# Patient Record
Sex: FEMALE | Race: WHITE | Employment: OTHER | URBAN - METROPOLITAN AREA
[De-identification: names, ages, dates, MRNs, and addresses within clinical notes are randomized per-mention and may not be internally consistent; named-entity substitution may affect disease eponyms.]

---

## 2017-06-30 ENCOUNTER — HOSPITAL ENCOUNTER (INPATIENT)
Age: 72
LOS: 3 days | Discharge: HOME | DRG: 291 | End: 2017-07-03
Attending: EMERGENCY MEDICINE | Admitting: HOSPITALIST
Payer: MEDICARE

## 2017-06-30 ENCOUNTER — APPOINTMENT (OUTPATIENT)
Dept: GENERAL RADIOLOGY | Age: 72
DRG: 291 | End: 2017-06-30
Attending: EMERGENCY MEDICINE
Payer: MEDICARE

## 2017-06-30 DIAGNOSIS — J81.0 ACUTE PULMONARY EDEMA (HCC): ICD-10-CM

## 2017-06-30 DIAGNOSIS — R06.00 DYSPNEA, UNSPECIFIED TYPE: Primary | ICD-10-CM

## 2017-06-30 PROBLEM — J81.1 PULMONARY EDEMA: Status: ACTIVE | Noted: 2017-06-30

## 2017-06-30 LAB
ALBUMIN SERPL BCP-MCNC: 3 G/DL (ref 3.2–4.6)
ALBUMIN/GLOB SERPL: 0.7 {RATIO} (ref 1.2–3.5)
ALP SERPL-CCNC: 197 U/L (ref 50–136)
ALT SERPL-CCNC: 19 U/L (ref 12–65)
ANION GAP BLD CALC-SCNC: 7 MMOL/L (ref 7–16)
ARTERIAL PATENCY WRIST A: POSITIVE
AST SERPL W P-5'-P-CCNC: 13 U/L (ref 15–37)
BASE EXCESS BLDA CALC-SCNC: 6.7 MMOL/L (ref 0–3)
BASOPHILS # BLD AUTO: 0 K/UL (ref 0–0.2)
BASOPHILS # BLD: 1 % (ref 0–2)
BDY SITE: ABNORMAL
BILIRUB SERPL-MCNC: 0.5 MG/DL (ref 0.2–1.1)
BNP SERPL-MCNC: 384 PG/ML
BUN SERPL-MCNC: 18 MG/DL (ref 8–23)
CALCIUM SERPL-MCNC: 9.3 MG/DL (ref 8.3–10.4)
CHLORIDE SERPL-SCNC: 100 MMOL/L (ref 98–107)
CO2 SERPL-SCNC: 35 MMOL/L (ref 21–32)
COHGB MFR BLD: 1.4 % (ref 0.5–1.5)
CREAT SERPL-MCNC: 3.51 MG/DL (ref 0.6–1)
DIFFERENTIAL METHOD BLD: ABNORMAL
DO-HGB BLD-MCNC: 3 % (ref 0–5)
EOSINOPHIL # BLD: 0.1 K/UL (ref 0–0.8)
EOSINOPHIL NFR BLD: 2 % (ref 0.5–7.8)
ERYTHROCYTE [DISTWIDTH] IN BLOOD BY AUTOMATED COUNT: 15.2 % (ref 11.9–14.6)
GAS FLOW.O2 O2 DELIVERY SYS: 2 L/MIN
GLOBULIN SER CALC-MCNC: 4.5 G/DL (ref 2.3–3.5)
GLUCOSE BLD STRIP.AUTO-MCNC: 92 MG/DL (ref 65–100)
GLUCOSE SERPL-MCNC: 115 MG/DL (ref 65–100)
HCO3 BLDA-SCNC: 32 MMOL/L (ref 22–26)
HCT VFR BLD AUTO: 31.5 % (ref 35.8–46.3)
HGB BLD-MCNC: 10.1 G/DL (ref 11.7–15.4)
HGB BLDMV-MCNC: 10.1 GM/DL (ref 11.7–15)
IMM GRANULOCYTES # BLD: 0 K/UL (ref 0–0.5)
IMM GRANULOCYTES NFR BLD AUTO: 0.5 % (ref 0–5)
LYMPHOCYTES # BLD AUTO: 19 % (ref 13–44)
LYMPHOCYTES # BLD: 1.2 K/UL (ref 0.5–4.6)
MAGNESIUM SERPL-MCNC: 1.9 MG/DL (ref 1.8–2.4)
MCH RBC QN AUTO: 31.4 PG (ref 26.1–32.9)
MCHC RBC AUTO-ENTMCNC: 32.1 G/DL (ref 31.4–35)
MCV RBC AUTO: 97.8 FL (ref 79.6–97.8)
METHGB MFR BLD: 0.2 % (ref 0–1.5)
MONOCYTES # BLD: 0.5 K/UL (ref 0.1–1.3)
MONOCYTES NFR BLD AUTO: 7 % (ref 4–12)
NEUTS SEG # BLD: 4.6 K/UL (ref 1.7–8.2)
NEUTS SEG NFR BLD AUTO: 71 % (ref 43–78)
OXYHGB MFR BLDA: 95.4 % (ref 94–97)
PCO2 BLDA: 48 MMHG (ref 35–45)
PH BLDA: 7.44 [PH] (ref 7.35–7.45)
PHOSPHATE SERPL-MCNC: 2.2 MG/DL (ref 2.3–3.7)
PLATELET # BLD AUTO: 123 K/UL (ref 150–450)
PMV BLD AUTO: 9.2 FL (ref 10.8–14.1)
PO2 BLDA: 97 MMHG (ref 75–100)
POTASSIUM SERPL-SCNC: 3.3 MMOL/L (ref 3.5–5.1)
PROT SERPL-MCNC: 7.5 G/DL (ref 6.3–8.2)
RBC # BLD AUTO: 3.22 M/UL (ref 4.05–5.25)
SAO2 % BLD: 97 % (ref 92–98.5)
SODIUM SERPL-SCNC: 142 MMOL/L (ref 136–145)
TROPONIN I BLD-MCNC: 0.03 NG/ML (ref 0–0.08)
TROPONIN I SERPL-MCNC: 0.03 NG/ML (ref 0.02–0.05)
VENTILATION MODE VENT: ABNORMAL
WBC # BLD AUTO: 6.4 K/UL (ref 4.3–11.1)

## 2017-06-30 PROCEDURE — 99284 EMERGENCY DEPT VISIT MOD MDM: CPT | Performed by: EMERGENCY MEDICINE

## 2017-06-30 PROCEDURE — 84484 ASSAY OF TROPONIN QUANT: CPT | Performed by: EMERGENCY MEDICINE

## 2017-06-30 PROCEDURE — 65270000029 HC RM PRIVATE

## 2017-06-30 PROCEDURE — 71020 XR CHEST PA LAT: CPT

## 2017-06-30 PROCEDURE — 74011250636 HC RX REV CODE- 250/636: Performed by: HOSPITALIST

## 2017-06-30 PROCEDURE — 86580 TB INTRADERMAL TEST: CPT | Performed by: HOSPITALIST

## 2017-06-30 PROCEDURE — 82803 BLOOD GASES ANY COMBINATION: CPT

## 2017-06-30 PROCEDURE — 93005 ELECTROCARDIOGRAM TRACING: CPT | Performed by: EMERGENCY MEDICINE

## 2017-06-30 PROCEDURE — 83880 ASSAY OF NATRIURETIC PEPTIDE: CPT | Performed by: EMERGENCY MEDICINE

## 2017-06-30 PROCEDURE — 80053 COMPREHEN METABOLIC PANEL: CPT | Performed by: EMERGENCY MEDICINE

## 2017-06-30 PROCEDURE — 85025 COMPLETE CBC W/AUTO DIFF WBC: CPT | Performed by: EMERGENCY MEDICINE

## 2017-06-30 PROCEDURE — 36600 WITHDRAWAL OF ARTERIAL BLOOD: CPT

## 2017-06-30 PROCEDURE — 84100 ASSAY OF PHOSPHORUS: CPT | Performed by: HOSPITALIST

## 2017-06-30 PROCEDURE — 74011250637 HC RX REV CODE- 250/637: Performed by: HOSPITALIST

## 2017-06-30 PROCEDURE — 82962 GLUCOSE BLOOD TEST: CPT

## 2017-06-30 PROCEDURE — 74011000302 HC RX REV CODE- 302: Performed by: HOSPITALIST

## 2017-06-30 PROCEDURE — 83735 ASSAY OF MAGNESIUM: CPT | Performed by: HOSPITALIST

## 2017-06-30 RX ORDER — LANOLIN ALCOHOL/MO/W.PET/CERES
1000 CREAM (GRAM) TOPICAL DAILY
Status: DISCONTINUED | OUTPATIENT
Start: 2017-07-01 | End: 2017-07-03 | Stop reason: HOSPADM

## 2017-06-30 RX ORDER — ACETAMINOPHEN 325 MG/1
650 TABLET ORAL
Status: DISCONTINUED | OUTPATIENT
Start: 2017-06-30 | End: 2017-07-03 | Stop reason: HOSPADM

## 2017-06-30 RX ORDER — HEPARIN SODIUM 5000 [USP'U]/ML
5000 INJECTION, SOLUTION INTRAVENOUS; SUBCUTANEOUS EVERY 12 HOURS
Status: DISCONTINUED | OUTPATIENT
Start: 2017-06-30 | End: 2017-07-02

## 2017-06-30 RX ORDER — DIPHENHYDRAMINE HCL 25 MG
25 CAPSULE ORAL
Status: DISCONTINUED | OUTPATIENT
Start: 2017-06-30 | End: 2017-07-03 | Stop reason: HOSPADM

## 2017-06-30 RX ORDER — ALLOPURINOL 100 MG/1
100 TABLET ORAL DAILY
Status: DISCONTINUED | OUTPATIENT
Start: 2017-07-01 | End: 2017-07-03 | Stop reason: HOSPADM

## 2017-06-30 RX ORDER — FLUDROCORTISONE ACETATE 0.1 MG/1
100 TABLET ORAL
Status: DISCONTINUED | OUTPATIENT
Start: 2017-06-30 | End: 2017-07-03 | Stop reason: HOSPADM

## 2017-06-30 RX ORDER — MIDODRINE HYDROCHLORIDE 5 MG/1
2.5 TABLET ORAL
Status: DISCONTINUED | OUTPATIENT
Start: 2017-07-01 | End: 2017-07-03 | Stop reason: HOSPADM

## 2017-06-30 RX ORDER — INSULIN LISPRO 100 [IU]/ML
INJECTION, SOLUTION INTRAVENOUS; SUBCUTANEOUS
Status: DISCONTINUED | OUTPATIENT
Start: 2017-06-30 | End: 2017-07-02

## 2017-06-30 RX ORDER — SEVELAMER HYDROCHLORIDE 400 MG/1
800 TABLET, FILM COATED ORAL
Status: DISCONTINUED | OUTPATIENT
Start: 2017-07-01 | End: 2017-07-03 | Stop reason: HOSPADM

## 2017-06-30 RX ORDER — GABAPENTIN 100 MG/1
200 CAPSULE ORAL 3 TIMES DAILY
Status: DISCONTINUED | OUTPATIENT
Start: 2017-06-30 | End: 2017-07-03 | Stop reason: HOSPADM

## 2017-06-30 RX ORDER — SODIUM CHLORIDE 0.9 % (FLUSH) 0.9 %
5-10 SYRINGE (ML) INJECTION AS NEEDED
Status: DISCONTINUED | OUTPATIENT
Start: 2017-06-30 | End: 2017-07-03 | Stop reason: HOSPADM

## 2017-06-30 RX ORDER — DEXTROSE 50 % IN WATER (D50W) INTRAVENOUS SYRINGE
25-50 AS NEEDED
Status: DISCONTINUED | OUTPATIENT
Start: 2017-06-30 | End: 2017-07-03 | Stop reason: HOSPADM

## 2017-06-30 RX ORDER — ONDANSETRON 4 MG/1
4 TABLET, ORALLY DISINTEGRATING ORAL
Status: DISCONTINUED | OUTPATIENT
Start: 2017-06-30 | End: 2017-07-03 | Stop reason: HOSPADM

## 2017-06-30 RX ORDER — SODIUM CHLORIDE 0.9 % (FLUSH) 0.9 %
5-10 SYRINGE (ML) INJECTION EVERY 8 HOURS
Status: DISCONTINUED | OUTPATIENT
Start: 2017-06-30 | End: 2017-07-03 | Stop reason: HOSPADM

## 2017-06-30 RX ORDER — CLOPIDOGREL BISULFATE 75 MG/1
75 TABLET ORAL DAILY
Status: DISCONTINUED | OUTPATIENT
Start: 2017-07-01 | End: 2017-07-03 | Stop reason: HOSPADM

## 2017-06-30 RX ORDER — PANTOPRAZOLE SODIUM 40 MG/1
40 TABLET, DELAYED RELEASE ORAL DAILY
Status: DISCONTINUED | OUTPATIENT
Start: 2017-07-01 | End: 2017-07-03 | Stop reason: HOSPADM

## 2017-06-30 RX ORDER — DEXTROSE 40 %
15 GEL (GRAM) ORAL AS NEEDED
Status: DISCONTINUED | OUTPATIENT
Start: 2017-06-30 | End: 2017-07-03 | Stop reason: HOSPADM

## 2017-06-30 RX ORDER — INSULIN GLARGINE 100 [IU]/ML
40 INJECTION, SOLUTION SUBCUTANEOUS
Status: DISCONTINUED | OUTPATIENT
Start: 2017-06-30 | End: 2017-07-02

## 2017-06-30 RX ADMIN — GABAPENTIN 200 MG: 100 CAPSULE ORAL at 22:16

## 2017-06-30 RX ADMIN — Medication 10 ML: at 22:19

## 2017-06-30 RX ADMIN — HEPARIN SODIUM 5000 UNITS: 5000 INJECTION, SOLUTION INTRAVENOUS; SUBCUTANEOUS at 22:16

## 2017-06-30 RX ADMIN — TUBERCULIN PURIFIED PROTEIN DERIVATIVE 5 UNITS: 5 INJECTION INTRADERMAL at 22:17

## 2017-06-30 NOTE — IP AVS SNAPSHOT
Mike Lea 
 
 
 [address] [facility] [address] [facility] 
[phone] Patient: Rosana Both MRN: [id number] OCV:8/44/[id number] You are allergic to the following Allergen Reactions Codeine Other (comments) Pt can not recall type of reaction -- only remembers being told allergic Immunizations Administered for This Admission Name Date  
 TB Skin Test (PPD) Intradermal 6/30/2017 Recent Documentation Height Weight BMI OB Status Smoking Status 1.575 m 94.9 kg 38.28 kg/m2 Hysterectomy Never Smoker Emergency Contacts Name Discharge Info Relation Home Work Mobile Spencer Pacheco  Spouse [3] [phone] About your hospitalization You were admitted on:  June 30, 2017 You last received care in the:  [facility] You were discharged on:  July 3, 2017 Unit phone number:  [phone] Why you were hospitalized Your primary diagnosis was:  Pulmonary Edema Your diagnoses also included:  Esrd On Peritoneal Dialysis (Hcc), Hypothyroidism, Juan (Obstructive Sleep Apnea), Esrd On Hemodialysis (Hcc), Dm (Diabetes Mellitus) (Hcc), Chronic Diastolic Heart Failure (Hcc), Htn (Hypertension), Coronary Atherosclerosis Of Native Coronary Vessel, Chronic Respiratory Failure (Hcc) Providers Seen During Your Hospitalizations Provider Role Specialty Primary office phone Yu Tejada DO Attending Provider Emergency Medicine [phone] Zuri Guerra MD Attending Provider Internal Medicine [phone] Your Primary Care Physician (PCP) Primary Care Physician Office Phone Office Fax Lil, [address] Follow-up Information Follow up With Details Comments Contact Info Ramya Wilkins MD  Need follow up in 1 week message left for office to call patient at home. [facility] [id number] [phone] Current Discharge Medication List  
  
START taking these medications Dose & Instructions Dispensing Information Comments Morning Noon Evening Bedtime  
 carvedilol 3.125 mg tablet Commonly known as:  Master Ribeiro Dose:  3.125 mg Take 1 Tab by mouth two (2) times daily (with meals). Quantity:  60 Tab Refills:  0 CONTINUE these medications which have CHANGED Dose & Instructions Dispensing Information Comments Morning Noon Evening Bedtime  
 insulin glargine 100 unit/mL injection Commonly known as:  LANTUS What changed:   
- how much to take - when to take this Your next dose is: Take tonight Dose:  30 Units 30 Units by SubCUTAneous route daily. Quantity:  1 Vial  
Refills:  0 CONTINUE these medications which have NOT CHANGED Dose & Instructions Dispensing Information Comments Morning Noon Evening Bedtime  
 acetaminophen 325 mg tablet Commonly known as:  TYLENOL Dose:  650 mg Take 2 Tabs by mouth every four (4) hours as needed for Fever (for fever greater than 100.4 F). Quantity:  30 Tab Refills:  0  
     
   
   
   
  
 allopurinol 100 mg tablet Commonly known as:  Shante Fulling Your next dose is:  Tomorrow Morning Take  by mouth every morning. Refills:  0  
     
  
   
   
   
  
 fludrocortisone 0.1 mg tablet Commonly known as:  FLORINEF Your next dose is: Take tonight Take  by mouth nightly. Refills:  0  
     
   
   
   
  
  
 midodrine 2.5 mg tablet Commonly known as:  Michael Loser Your next dose is: This evening Take  by mouth three (3) times daily (with meals). Refills:  0 NEURONTIN 100 mg capsule Generic drug:  gabapentin Your next dose is: This evening Dose:  200 mg Take 200 mg by mouth three (3) times daily. Refills:  0 Omega-3 Fatty Acids 300 mg Cap Your next dose is: Take tonight Take  by mouth nightly. Last dose 6/20/16 Refills:  0  
     
   
   
   
  
  
 pantoprazole 40 mg tablet Commonly known as:  PROTONIX Your next dose is:  Tomorrow Morning Dose:  40 mg Take 40 mg by mouth daily. Refills:  0 PLAVIX 75 mg Tab Generic drug:  clopidogrel Your next dose is:  Tomorrow Morning Take  by mouth daily. Refills:  0  
     
  
   
   
   
  
 RENVELA 800 mg Tab tab Generic drug:  sevelamer carbonate Your next dose is: This evening Dose:  800 mg Take 800 mg by mouth three (3) times daily (with meals). Refills:  0  
     
  
   
  
   
  
   
  
 SYNTHROID 175 mcg tablet Generic drug:  levothyroxine Your next dose is:  Tomorrow Morning Dose:  175 mcg Take 175 mcg by mouth Daily (before breakfast). Refills:  0  
     
  
   
   
   
  
 VITAMIN B-12 1,000 mcg tablet Generic drug:  cyanocobalamin Your next dose is:  Tomorrow Morning Dose:  1000 mcg Take 1,000 mcg by mouth every morning. Refills:  0 Where to Get Your Medications Information on where to get these meds will be given to you by the nurse or doctor. ! Ask your nurse or doctor about these medications  
  carvedilol 3.125 mg tablet  
 insulin glargine 100 unit/mL injection Discharge Instructions Oxygen Therapy: Care Instructions Your Care Instructions Oxygen therapy helps you get more oxygen into your lungs and bloodstream. You may use it if you have a disease that makes it hard to breathe, such as COPD, pulmonary fibrosis (scarring of the lungs), or heart failure. Oxygen therapy can make it easier for you to breathe and can reduce your heart's workload. Some people need extra oxygen all the time.  Others need it from time to time throughout the day or overnight. A doctor will prescribe how much oxygen you need and how often to use it. To breathe the oxygen, most people use a nasal cannula (say \"THO-yuh-tia\"). This is a thin tube with two prongs that fit just inside your nose. People who need a lot of oxygen may need to use a mask that fits over the nose and mouth. Follow-up care is a key part of your treatment and safety. Be sure to make and go to all appointments, and call your doctor if you are having problems. It's also a good idea to know your test results and keep a list of the medicines you take. How can you care for yourself at home? To help yourself · Using oxygen may dry out your nose or lips. Use water-based lubricants on your lips or nostrils. Do not use an oil-based product like petroleum jelly. · If you use a nasal cannula, the tubing may rub under your nostrils and around your ears. To keep your skin from getting sore, tuck some gauze under the tubing. Use a water-based lotion on rubbed areas. · Do not use alcohol or take drugs that relax you, because they will slow your breathing rate. · Keep track of how much oxygen is in the tank, and reorder before it runs out. If a holiday is coming up or you expect bad weather, order in advance or make your regular order larger. · You may need extra oxygen when you travel to high altitudes or travel by plane. Ask your doctor about this. · If you are getting oxygen directly to your windpipe through an opening in your neck, your doctor will teach you how to care for the equipment. To make sure oxygen is flowing · Check the flow by holding your mask or cannula up to your ear and listening for the \"hiss\" of airflow. · If you have a nasal cannula, dip the prongs in a glass of water. If you see bubbles, oxygen is coming through. · Check your pressure gauge or contents indicator.  
· If you use an oxygen concentrator, make sure it is turned on and plugged in. If you use a cylinder, make sure the valve is open. · Look for kinks, blockages, or water in the tubing. Be sure the tubing is connected to the oxygen source. · Do not change your oxygen flow rate. Your doctor sets this at the correct level. Higher flow rates usually do not help and can increase the risk of harmful carbon dioxide buildup in the blood. To be safe · Do not leave cords or tubing running across an area where you or someone else may trip on it. · Do not let oxygen containers get hot. Store them in a cool place where there is airflow. Do not leave them in a car trunk or a hot vehicle. · Keep oxygen containers upright. Make sure they do not fall over and get damaged. Try securing the tanks in a sturdy container or securing them with a rope or a chain. · Watch for signs of oxygen leaks. If you hear a loud hissing from your container or if it empties too fast, stay away from the container. Open windows right away and call the company that brought the oxygen system to your home. · Do not use oxygen around anything that could spark or easily cause a fire. ¨ Do not smoke or let others smoke while you are using oxygen. Put up \"no smoking\" signs in your home. ¨ Do not use oxygen near open flames, such as candles, fireplaces, gas stoves, or hot water heaters. Do not use it near electric razors, hair dryers, heating pads, or anything that may spark. ¨ Keep a working fire extinguisher in your home where it is easy to get to. ¨ If a fire starts, turn off the oxygen right away and leave the house. ¨ If you have an oxygen concentrator, do not use it if the cord looks damaged. Do not use an extension cord to plug it in. Do not plug it into an outlet that has other appliances plugged into it. To care for the equipment · Follow the directions that come with the equipment for using and caring for it.  
· Wash your cannula or mask with a liquid soap and warm water 1 or 2 times a week. Replace them every 2 to 4 weeks. · If you have a cold, change the nasal prongs when your cold symptoms are done. · If you have an oxygen concentrator, unplug the unit and wipe down the cabinet with a damp cloth daily. Clean the air filter at least 2 times a week. Where can you learn more? Go to http://chapin-dusty.info/. Enter E117 in the search box to learn more about \"Oxygen Therapy: Care Instructions. \" Current as of: March 25, 2017 Content Version: 11.3 © 1689-4225 [employer]. Care instructions adapted under license by [employer] (which disclaims liability or warranty for this information). If you have questions about a medical condition or this instruction, always ask your healthcare professional. [address] any warranty or liability for your use of this information. Pulmonary Edema: Care Instructions Your Care Instructions Pulmonary edema is the buildup of fluid in the lungs. It usually occurs when the heart does not pump blood through the body properly. Pulmonary edema can also be caused by another disease, such as liver or kidney failure. It can also happen at high altitudes, from a poisoning, or as a result of a near-drowning. If you have fluid in your lungs, you may have trouble breathing, be restless, have a fast heart rate, or cough up foamy pink fluid. Breathing problems may be worse when you lie down. Follow-up care is a key part of your treatment and safety. Be sure to make and go to all appointments, and call your doctor if you are having problems. It's also a good idea to know your test results and keep a list of the medicines you take. How can you care for yourself at home? Medicines · Take your medicines exactly as prescribed. Call your doctor if you think you are having a problem with your medicine. · Review all of your regular medicines with your doctor.  Do not take any vitamins, over-the-counter medicines, or herbal products without talking to your doctor first. 
Diet · Eat a balanced diet. Make an appointment with a dietitian if you have questions about what type of diet might be best for you. · Do not eat more than 2,000 milligrams (mg) of sodium each day. That is less than 1 teaspoon of salt a day, including all the salt you eat in prepared or packaged foods. ¨ Do not add salt while you are cooking or at the table. Flavor with garlic, lemon juice, onion, vinegar, herbs, and spices instead of salt. ¨ Eat fewer processed foods and foods from restaurants, including fast food. ¨ Use fresh or frozen foods instead of canned. ¨ Count and record how much sodium you eat each day. Check food labels for sodium. ¨ Ask your doctor before using salt substitutes that have potassium, such as Lite Salt. Lifestyle · Stay out of air pollution; smog; cold, dry air; hot, humid air; and high altitudes. · Learn breathing methods that help the airflow in and out of your lungs. · Take rest breaks often. Schedule short rest breaks when doing housework and other activities. An occupational or physical therapist can help you find ways to do everyday activities with less effort. · Start light exercise if your doctor says it is okay. Try to stay as active as possible. If you have not exercised in the past, start out slowly. Walking is a good way to start. · Get enough rest at night. Sleeping with 1 or 2 pillows under your upper body and head may help you breathe easier at night. · Discuss rehabilitation with your doctor. Find out what programs are available in your area. · Do not smoke or use other tobacco products. Smoking can make your condition worse. If you need help quitting, talk to your doctor about stop-smoking programs and medicines. These can increase your chances of quitting for good. · Do not use alcohol or illegal drugs. When should you call for help? Call 911 anytime you think you may need emergency care. For example, call if: 
· You have severe trouble breathing. · You passed out (lost consciousness). · You have symptoms of a heart attack. These may include: ¨ Chest pain or pressure, or a strange feeling in the chest. 
¨ Sweating. ¨ Shortness of breath. ¨ Nausea or vomiting. ¨ Pain, pressure, or a strange feeling in the back, neck, jaw, or upper belly or in one or both shoulders or arms. ¨ Lightheadedness or sudden weakness. ¨ A fast or irregular heartbeat. Pain that spreads from the chest to the neck, jaw, or one or both shoulders or arms. After you call 911, the  may tell you to chew 1 adult-strength or 2 to 4 low-dose aspirin. Wait for an ambulance. Do not try to drive yourself. Call your doctor now or seek immediate medical care if: 
· You have trouble breathing or have wheezing that is getting worse. · You are coughing more deeply or more often. · You cough up blood. · You get a fever. · You have more swelling in your legs or belly. · Your symptoms are getting worse. Watch closely for changes in your health, and be sure to contact your doctor if you have any problems. Where can you learn more? Go to http://chapin-dusty.info/. Enter F487 in the search box to learn more about \"Pulmonary Edema: Care Instructions. \" Current as of: March 25, 2017 Content Version: 11.3 © 6104-7059 [employer]. Care instructions adapted under license by [employer] (which disclaims liability or warranty for this information). If you have questions about a medical condition or this instruction, always ask your healthcare professional. [place] [address] any warranty or liability for your use of this information. Learning About Heart Failure What is heart failure?  
 
Heart failure means that your heart muscle does not pump as much blood as your body needs. Failure does not mean that your heart has stopped. It means that your heart is not pumping as well as it should. Your body has an amazing ability to make up for heart failure. It may do such a good job that you don't know you have a disease. But at some point, your heart and body will no longer be able to keep up. Then fluid starts to build up in your lungs and other parts of your body. What can you expect when you have heart failure? Heart failure is a lifelong (chronic) disease. Treatment may be able to slow the disease and help you feel better. But heart failure tends to get worse over time. Despite this, there are many steps you can take to feel better and stay healthy longer. Early on, your symptoms may not be too bad. As heart failure gets worse, symptoms typically get worse, and you may need to limit your activities. Heart failure can also get worse suddenly. If this happens, you need emergency care. Then, after treatment, your symptoms may go back to being stable (which means they stay the same) for a long time. Heart failure can lead to other health problems, such as heart rhythm problems. Over time, your treatment options may change, especially as your symptoms get worse. As heart failure gets worse, palliative care can help improve the quality of your life. You can do advance care planning to decide what kind of care you want at the end of your life. What are the symptoms? Symptoms of heart failure start to happen when your heart can't pump enough blood to the rest of your body. In the early stages of heart failure, you may: · Feel tired easily. · Be short of breath when you exert yourself. · Feel like your heart is pounding or racing (palpitations). · Feel weak or dizzy. As heart failure gets worse, fluid starts to build up in your lungs and other parts of your body. This may cause you to: · Feel short of breath even at rest. 
 · Have swelling (edema), especially in your legs, ankles, and feet. · Gain weight. This may happen over just a day or two, or more slowly. · Cough or wheeze, especially when you lie down. How is heart failure treated? · You'll probably take several medicines. · You might attend cardiac rehabilitation (rehab) to get education and support that help you make lifestyle changes and stay as healthy as possible. · You may get a heart device. A pacemaker helps your heart pump blood. An ICD can stop abnormal heart rhythms. How can you care for yourself? There are many steps you can take to feel better and stay healthy longer. These steps are an important part of treatment. They can help you stay active and enjoy life. · Take your medicine the right way. Avoid medicines that can make your symptoms worse. · Check your weight and symptoms every day. Know what to do if your symptoms get worse. · Limit sodium. This helps keep fluid from building up. It may help you feel better. · Be active. Exercise regularly, but don't exercise too hard. · Be heart-healthy. Eat healthy foods, stay at a healthy weight, limit alcohol, and don't smoke. · Stay as healthy as possible. Avoid colds and flu, get help for depression and anxiety, and manage stress. Follow-up care is a key part of your treatment and safety. Be sure to make and go to all appointments, and call your doctor if you are having problems. It's also a good idea to know your test results and keep a list of the medicines you take. Where can you learn more? Go to http://chapin-dusty.info/. Enter L012 in the search box to learn more about \"Learning About Heart Failure. \" Current as of: November 15, 2016 Content Version: 11.3 © 8860-7993 [email or web address]. Care instructions adapted under license by [employer] (which disclaims liability or warranty for this information).  If you have questions about a medical condition or this instruction, always ask your healthcare professional. [address] any warranty or liability for your use of this information. DISCHARGE SUMMARY from Nurse The following personal items are in your possession at time of discharge: 
 
Dental Appliances: None Home Medications: None Jewelry: None Clothing: Marbella Busman Other Valuables: None PATIENT INSTRUCTIONS: 
 
 
F-face looks uneven A-arms unable to move or move unevenly S-speech slurred or non-existent T-time-call 911 as soon as signs and symptoms begin-DO NOT go Back to bed or wait to see if you get better-TIME IS BRAIN. Warning Signs of HEART ATTACK Call 911 if you have these symptoms: 
? Chest discomfort. Most heart attacks involve discomfort in the center of the chest that lasts more than a few minutes, or that goes away and comes back. It can feel like uncomfortable pressure, squeezing, fullness, or pain. ? Discomfort in other areas of the upper body. Symptoms can include pain or discomfort in one or both arms, the back, neck, jaw, or stomach. ? Shortness of breath with or without chest discomfort. ? Other signs may include breaking out in a cold sweat, nausea, or lightheadedness. Don't wait more than five minutes to call [address]! Fast action can save your life. Calling 911 is almost always the fastest way to get lifesaving treatment. Emergency Medical Services staff can begin treatment when they arrive  up to an hour sooner than if someone gets to the hospital by car. The discharge information has been reviewed with the patient.   The patient verbalized understanding. Discharge medications reviewed with the patient and appropriate educational materials and side effects teaching were provided. Discharge Orders None [employer] Announcement We are excited to announce that we are making your provider's discharge notes available to you in [facility]. You will see these notes when they are completed and signed by the physician that discharged you from your recent hospital stay. If you have any questions or concerns about any information you see in [facility], please call the Health Information Department where you were seen or reach out to your Primary Care Provider for more information about your plan of care. Introducing [address]! Dear Kerri Keita: Thank you for requesting a [employer] account. Our records indicate that you have previously registered for a [employer] account but its currently inactive. Please call our [employer] support line at [phone]. Additional Information If you have questions, please visit the Frequently Asked Questions section of the [employer] website at [email or web address] Remember, [employer] is NOT to be used for urgent needs. For medical emergencies, dial 911. Now available from your iPhone and Android! General Information Please provide this summary of care documentation to your next provider. Patient Signature:  ____________________________________________________________ Date:  ____________________________________________________________  
  
Nav Maynard Provider Signature:  ____________________________________________________________ Date:  ____________________________________________________________

## 2017-06-30 NOTE — ED PROVIDER NOTES
Patient is a 81 y.o. female presenting with shortness of breath. The history is provided by the patient and the spouse. Shortness of Breath   This is a new problem. The average episode lasts 4 days. The problem has not changed since onset. Associated symptoms include sputum production. Pertinent negatives include no fever, no headaches, no rhinorrhea, no ear pain, no cough, no wheezing, no chest pain, no syncope, no vomiting, no abdominal pain and no leg swelling. She has tried nothing for the symptoms. Associated medical issues include CAD. Associated medical issues do not include asthma, COPD, chronic lung disease or DVT. Past Medical History:   Diagnosis Date    Anemia of chronic disease     Arthritis     arthritis OA - generalized multiple joints - pt says \"not really any problems\" (6/21/16)    Chronic diastolic heart failure (HCC)     Chronic kidney disease     HD, dialysis MWF in [facility]    Chronic pain     pt denies this    Chronic respiratory failure (HCC)     Coagulation disorder ([address]     pernicious anemia chronic - tx with procrit     Congestive heart failure, unspecified     Coronary atherosclerosis of native coronary vessel     CVA (cerebral vascular accident) ([address] 6/25/2014    right hand weakness    Diabetes ([address] 1980's    type II; avg fastings- 105, last HA1C 5.8 in May 2016? Hypo sxs @ 80    Diabetic neuropathy (HCC)     DM (diabetes mellitus) ([address] 7/16/2014    End stage renal disease ([address]     ESRD on hemodialysis ([address] 4/2014    multiple temporary access sites    Fibromyalgia     Gout     Heart failure ([address]     Dx 2009 FROM FLUID OVERLOAD FROM KIDNEY DISEASE    Hemodialysis access site with mature fistula ([address] 8/27/14    permanent AV site left forearm placed    Hemodialysis access, AV graft ([address] 9/9/2014 8/27/14 (Dr Juan Delgado) Brachial artery antecubital forearm loop fistula.       Hepatomegaly     not sure why this is here- per pt    History of stroke 8/28/2014    Previous right residual     Hypercholesterolemia     Hypothyroidism approx 2000    controlled with meds     Lupus ([facility]     systemic    Morbid obesity ([address] 6/21/16    BMI- 39.2 (verbal)    NSVT (nonsustained ventricular tachycardia) ([address] 6/19/2014    Orthostatic hypotension     CASSIE (obstructive sleep apnea) 6/21/16    BIPAP     Poor historian 6/21/16    probably related to CVA in 2014    Sepsis ([facility] 6/19/2014    UNCLEAR ETIOLOGY      Stroke ([facility]     Unable to bear weight 8/28/2014    Unspecified sleep apnea 11/13    bi-pap    Vertigo as late effect of stroke 6/21/16    Weakness of both lower limbs 8/28/2014       Past Surgical History:   Procedure Laterality Date    COLONOSCOPY N/A 6/23/2016    COLONOSCOPY  BMI 37 performed by Brooks Brady MD at [address] [facility] HX GI      insertion of Peritoneal port RLQ of abdomen     HX GI      removal of peritoneal port     HX HYSTERECTOMY  1975    unknown ovaries    HX LAP CHOLECYSTECTOMY  2009    HX VASCULAR ACCESS  2013 and 4    multiple temporaries    HX VASCULAR ACCESS  8/27/14    permanent placement left forearm    HX VASCULAR ACCESS      Subclavian port in R chest wall (active)     VASCULAR SURGERY PROCEDURE UNLIST  8-27-14    L Brachial artery antecubital forearm loop fistula    VASCULAR SURGERY PROCEDURE UNLIST      L UA AV shunt placement (inactive)          Family History:   Problem Relation Age of Onset    Heart Disease Mother     Diabetes Mother     Lung Disease Mother     Heart Disease Father     Stroke Father     Diabetes Father     Cancer Sister     Diabetes Sister     Heart Disease Sister        Social History     Social History    Marital status:      Spouse name: N/A    Number of children: N/A    Years of education: N/A     Occupational History    Not on file.      Social History Main Topics    Smoking status: Never Smoker    Smokeless tobacco: Never Used    Alcohol use No    Drug use: No    Sexual activity: No     Other Topics Concern    Not on file     Social History Narrative    6/2014Married and lives with her . Disabled but previously worked as a . 8/28/14:  UNCHANGED FROM ABOVE. ALLERGIES: Codeine    Review of Systems   Constitutional: Negative. Negative for chills and fever. HENT: Negative. Negative for congestion, ear pain, postnasal drip and rhinorrhea. Eyes: Negative for pain and visual disturbance. Respiratory: Positive for sputum production and shortness of breath. Negative for cough and wheezing. Cardiovascular: Negative for chest pain, leg swelling and syncope. Gastrointestinal: Negative. Negative for abdominal distention, abdominal pain and vomiting. Endocrine: Negative. Negative for polydipsia, polyphagia and polyuria. Genitourinary: Negative. Negative for difficulty urinating, flank pain and frequency. Musculoskeletal: Negative. Negative for arthralgias and myalgias. Skin: Negative. Neurological: Negative. Negative for dizziness and headaches. Hematological: Negative. Vitals:    06/30/17 1708   BP: 173/61   Pulse: 84   Resp: 18   Temp: 97.6 °F (36.4 °C)   SpO2: 91%   Weight: 98.4 kg (217 lb)   Height: 5' 2\" (1.575 m)            Physical Exam   Constitutional: She is oriented to person, place, and time. She appears well-developed and well-nourished. No distress. HENT:   Head: Normocephalic and atraumatic. Cardiovascular: Intact distal pulses. Pulmonary/Chest: Effort normal. No respiratory distress. She has no wheezes. She has no rales. Abdominal: Soft. She exhibits no distension. There is no tenderness. There is no rebound. Neurological: She is alert and oriented to person, place, and time. Skin: Skin is warm and dry. Psychiatric: She has a normal mood and affect. Her behavior is normal.   Nursing note and vitals reviewed.        MDM  Number of Diagnoses or Management Options  Dyspnea, unspecified type: new and requires workup  Diagnosis management comments: ECG demonstrates a left bundle branch block which appears to be consistent with previous EKGs. Patient is pain-free. RN placed on nasal cannula as they felt her oxygen saturation was low in triage but currently is in the mid 90s. According to the patient  she used to wear oxygen at home for unknown reason for at least a couple years but has been off of home oxygen recently. Patient states that she has had relatively constant symptoms for the last 4 days. She describes a feeling of incomplete deep breaths. She states that she does not feel 100% at rest but does feel better than when she is doing something or lying down. She does have a history of diastolic heart failure and CAD. Denies history of DVT or PE. Patient also has end-stage renal disease on hemodialysis and had dialysis earlier today. She states they're aware of her symptoms but did not adjust her dialysis and felt that her dialysis didn't need to change as she has been consistent with her weights. Patient currently in x-ray and symptoms and findings thus far reviewed with the cardiologist on call. He asked that a BNP be added to her current labs to evaluate and would reassess after troponins and BNP completed. If any of these were positive, felt would possibly need to be admitted to their service otherwise felt could be managed on an outpatient basis given the consistency and unchanged systems for the last 4 days.        Amount and/or Complexity of Data Reviewed  Clinical lab tests: ordered and reviewed  Tests in the radiology section of CPT®: ordered and reviewed      ED Course       Procedures

## 2017-06-30 NOTE — ED TRIAGE NOTES
Pt c/o shortness of breath since Wednesday. Pt states she feels as if she cannot inhale enough. Pt is on dialysis- MWF. States she has had full runs all days this week. Pt denies any cough. Pt states pain when trying to inhale. Pt alert and oriented x 4. Respirations are even and unlabored. Pt appears in no acute distress at this time.

## 2017-07-01 LAB
ANION GAP BLD CALC-SCNC: 9 MMOL/L (ref 7–16)
ATRIAL RATE: 66 BPM
BUN SERPL-MCNC: 26 MG/DL (ref 8–23)
CALCIUM SERPL-MCNC: 9.4 MG/DL (ref 8.3–10.4)
CALCULATED P AXIS, ECG09: 63 DEGREES
CALCULATED R AXIS, ECG10: 78 DEGREES
CALCULATED T AXIS, ECG11: -71 DEGREES
CHLORIDE SERPL-SCNC: 97 MMOL/L (ref 98–107)
CO2 SERPL-SCNC: 33 MMOL/L (ref 21–32)
CREAT SERPL-MCNC: 4.77 MG/DL (ref 0.6–1)
DIAGNOSIS, 93000: NORMAL
ERYTHROCYTE [DISTWIDTH] IN BLOOD BY AUTOMATED COUNT: 15.5 % (ref 11.9–14.6)
EST. AVERAGE GLUCOSE BLD GHB EST-MCNC: NORMAL MG/DL
GLUCOSE BLD STRIP.AUTO-MCNC: 127 MG/DL (ref 65–100)
GLUCOSE BLD STRIP.AUTO-MCNC: 137 MG/DL (ref 65–100)
GLUCOSE BLD STRIP.AUTO-MCNC: 78 MG/DL (ref 65–100)
GLUCOSE BLD STRIP.AUTO-MCNC: 80 MG/DL (ref 65–100)
GLUCOSE BLD STRIP.AUTO-MCNC: 82 MG/DL (ref 65–100)
GLUCOSE SERPL-MCNC: 74 MG/DL (ref 65–100)
HBA1C MFR BLD: 4.9 % (ref 4.8–6)
HCT VFR BLD AUTO: 30.4 % (ref 35.8–46.3)
HGB BLD-MCNC: 9.4 G/DL (ref 11.7–15.4)
MCH RBC QN AUTO: 31.1 PG (ref 26.1–32.9)
MCHC RBC AUTO-ENTMCNC: 30.9 G/DL (ref 31.4–35)
MCV RBC AUTO: 100.7 FL (ref 79.6–97.8)
MM INDURATION POC: NORMAL MM (ref 0–5)
P-R INTERVAL, ECG05: 198 MS
PLATELET # BLD AUTO: 118 K/UL (ref 150–450)
PMV BLD AUTO: 9.7 FL (ref 10.8–14.1)
POTASSIUM SERPL-SCNC: 3.4 MMOL/L (ref 3.5–5.1)
PPD POC: 0 NEGATIVE
PROCALCITONIN SERPL-MCNC: 0.3 NG/ML
Q-T INTERVAL, ECG07: 454 MS
QRS DURATION, ECG06: 168 MS
QTC CALCULATION (BEZET), ECG08: 475 MS
RBC # BLD AUTO: 3.02 M/UL (ref 4.05–5.25)
SODIUM SERPL-SCNC: 139 MMOL/L (ref 136–145)
T4 FREE SERPL-MCNC: 1.3 NG/DL (ref 0.78–1.46)
TROPONIN I SERPL-MCNC: 0.03 NG/ML (ref 0.02–0.05)
TSH SERPL DL<=0.005 MIU/L-ACNC: 1.17 UIU/ML (ref 0.36–3.74)
VENTRICULAR RATE, ECG03: 66 BPM
WBC # BLD AUTO: 5.8 K/UL (ref 4.3–11.1)

## 2017-07-01 PROCEDURE — 65270000029 HC RM PRIVATE

## 2017-07-01 PROCEDURE — 84439 ASSAY OF FREE THYROXINE: CPT | Performed by: HOSPITALIST

## 2017-07-01 PROCEDURE — 83036 HEMOGLOBIN GLYCOSYLATED A1C: CPT | Performed by: HOSPITALIST

## 2017-07-01 PROCEDURE — 5A1D60Z PERFORMANCE OF URINARY FILTRATION, MULTIPLE: ICD-10-PCS | Performed by: INTERNAL MEDICINE

## 2017-07-01 PROCEDURE — 80048 BASIC METABOLIC PNL TOTAL CA: CPT | Performed by: HOSPITALIST

## 2017-07-01 PROCEDURE — 85027 COMPLETE CBC AUTOMATED: CPT | Performed by: HOSPITALIST

## 2017-07-01 PROCEDURE — 84484 ASSAY OF TROPONIN QUANT: CPT | Performed by: HOSPITALIST

## 2017-07-01 PROCEDURE — 82962 GLUCOSE BLOOD TEST: CPT

## 2017-07-01 PROCEDURE — 90935 HEMODIALYSIS ONE EVALUATION: CPT

## 2017-07-01 PROCEDURE — 77010033678 HC OXYGEN DAILY

## 2017-07-01 PROCEDURE — 84443 ASSAY THYROID STIM HORMONE: CPT | Performed by: HOSPITALIST

## 2017-07-01 PROCEDURE — 74011000250 HC RX REV CODE- 250: Performed by: HOSPITALIST

## 2017-07-01 PROCEDURE — C8929 TTE W OR WO FOL WCON,DOPPLER: HCPCS

## 2017-07-01 PROCEDURE — 74640000003 HC CRRT SET UP OR EXCHANGE

## 2017-07-01 PROCEDURE — 94760 N-INVAS EAR/PLS OXIMETRY 1: CPT

## 2017-07-01 PROCEDURE — 84145 PROCALCITONIN (PCT): CPT | Performed by: HOSPITALIST

## 2017-07-01 PROCEDURE — 74011250637 HC RX REV CODE- 250/637: Performed by: HOSPITALIST

## 2017-07-01 PROCEDURE — 94660 CPAP INITIATION&MGMT: CPT

## 2017-07-01 PROCEDURE — 74011250636 HC RX REV CODE- 250/636: Performed by: HOSPITALIST

## 2017-07-01 PROCEDURE — 36415 COLL VENOUS BLD VENIPUNCTURE: CPT | Performed by: HOSPITALIST

## 2017-07-01 RX ORDER — INSULIN GLARGINE 100 [IU]/ML
30 INJECTION, SOLUTION SUBCUTANEOUS DAILY
Qty: 1 VIAL | Refills: 0 | Status: ON HOLD
Start: 2017-07-01 | End: 2017-12-06

## 2017-07-01 RX ADMIN — PERFLUTREN 1 ML: 6.52 INJECTION, SUSPENSION INTRAVENOUS at 16:07

## 2017-07-01 RX ADMIN — GABAPENTIN 200 MG: 100 CAPSULE ORAL at 17:51

## 2017-07-01 RX ADMIN — RENAGEL 800 MG: 400 TABLET ORAL at 17:51

## 2017-07-01 RX ADMIN — GABAPENTIN 200 MG: 100 CAPSULE ORAL at 20:46

## 2017-07-01 RX ADMIN — CYANOCOBALAMIN TAB 1000 MCG 1000 MCG: 1000 TAB at 09:03

## 2017-07-01 RX ADMIN — CLOPIDOGREL BISULFATE 75 MG: 75 TABLET ORAL at 09:03

## 2017-07-01 RX ADMIN — Medication 5 ML: at 06:00

## 2017-07-01 RX ADMIN — GABAPENTIN 200 MG: 100 CAPSULE ORAL at 09:03

## 2017-07-01 RX ADMIN — HEPARIN SODIUM 5000 UNITS: 5000 INJECTION, SOLUTION INTRAVENOUS; SUBCUTANEOUS at 10:10

## 2017-07-01 RX ADMIN — Medication 10 ML: at 16:12

## 2017-07-01 RX ADMIN — LEVOTHYROXINE SODIUM 175 MCG: 125 TABLET ORAL at 05:18

## 2017-07-01 RX ADMIN — HEPARIN SODIUM 5000 UNITS: 5000 INJECTION, SOLUTION INTRAVENOUS; SUBCUTANEOUS at 20:46

## 2017-07-01 RX ADMIN — ALLOPURINOL 100 MG: 100 TABLET ORAL at 09:04

## 2017-07-01 RX ADMIN — PANTOPRAZOLE SODIUM 40 MG: 40 TABLET, DELAYED RELEASE ORAL at 09:03

## 2017-07-01 RX ADMIN — RENAGEL 800 MG: 400 TABLET ORAL at 05:18

## 2017-07-01 RX ADMIN — Medication 10 ML: at 23:50

## 2017-07-01 NOTE — PROGRESS NOTES
D/C cancelled due to hypoxia. Pt requires continued inpatient to cont treating pulm edema. Echo pending.   Obtain chest x ray in am.  May need additional HD tomorrow, will have nephrology see in am.

## 2017-07-01 NOTE — PROGRESS NOTES
TRANSFER - IN REPORT:    Verbal report received from [place] (name) on Jessee Mask  being received from ED(unit) for routine progression of care      Report consisted of patients Situation, Background, Assessment and   Recommendations(SBAR). Information from the following report(s) Kardex was reviewed with the receiving nurse. Opportunity for questions and clarification was provided. Assessment completed upon patients arrival to unit and care assumed.

## 2017-07-01 NOTE — PROGRESS NOTES
sats on RA 85% placed pt on 2 liters NC sats returned to 93% call placed to NP, will continue to monitor.

## 2017-07-01 NOTE — H&P
Hospitalist H&P/Consult Note     Admit Date:  2017  5:12 PM   Name:  Daija Mello   Age:  81 y.o.  :  1945   MRN:  [id number]   PCP:  Kymberly Torres MD  Treatment Team: Attending Provider: Glory Rossi MD    HPI:   Ashly 80 y/o female with ESRD, HD for past 5 years presents with shortness of breath without chest pain. Denies fever or chills. Has HD /Wed/Fri and had session today without incident. O2 sat was 90% on RA and she was placed on supplemental NC O2 in ED. Denies leg pain or swelling, no hx DVT or PE. She states last ECHO was over a year ago. Chest xray with acute mild pulmonary edema versus bibasilar atelectasis and/or consolidation. She is afebrile with a normal WBC ct of 6.4. She does not feel as if she has extra weight or fluid. Has CASSIE and uses BIPAP at night. No underlying respiratory disease such as COPD or pulmonary fibrosis. Follows with cardiology Dr Chela Olivas. Goes to dialysis center in [facility]. 10 systems reviewed and negative except as noted in HPI. Past Medical History:   Diagnosis Date    Anemia of chronic disease     Arthritis     arthritis OA - generalized multiple joints - pt says \"not really any problems\" (16)    Chronic diastolic heart failure (HCC)     Chronic kidney disease     HD, dialysis MWF in [facility]    Chronic pain     pt denies this    Chronic respiratory failure (HCC)     Coagulation disorder ([address]     pernicious anemia chronic - tx with procrit     Congestive heart failure, unspecified     Coronary atherosclerosis of native coronary vessel     CVA (cerebral vascular accident) ([address] 2014    right hand weakness    Diabetes ([address] 's    type II; avg fastings- 105, last HA1C 5.8 in May 2016?  Hypo sxs @ 82    Diabetic neuropathy (HCC)     DM (diabetes mellitus) ([address] 2014    End stage renal disease ([address]     ESRD on hemodialysis ([address] 2014    multiple temporary access sites    Fibromyalgia     Gout     Heart failure [facility])     Dx 2009 FROM FLUID OVERLOAD FROM KIDNEY DISEASE    Hemodialysis access site with mature fistula ([address] 8/27/14    permanent AV site left forearm placed    Hemodialysis access, AV graft ([address] 9/9/2014 8/27/14 (Dr Bladimir De) Brachial artery antecubital forearm loop fistula.  Hepatomegaly     not sure why this is here- per pt    History of stroke 8/28/2014    Previous right residual     Hypercholesterolemia     Hypothyroidism approx 2000    controlled with meds     Lupus ([address]     systemic    Morbid obesity ([address] 6/21/16    BMI- 39.2 (verbal)    NSVT (nonsustained ventricular tachycardia) ([address] 6/19/2014    Orthostatic hypotension     CASSIE (obstructive sleep apnea) 6/21/16    BIPAP     Poor historian 6/21/16    probably related to CVA in 2014    Sepsis ([address] 6/19/2014    UNCLEAR ETIOLOGY      Stroke ([address]     Unable to bear weight 8/28/2014    Unspecified sleep apnea 11/13    bi-pap    Vertigo as late effect of stroke 6/21/16    Weakness of both lower limbs 8/28/2014      Past Surgical History:   Procedure Laterality Date    COLONOSCOPY N/A 6/23/2016    COLONOSCOPY  BMI 37 performed by Cherl Spatz, MD at [address] [facility] HX GI      insertion of Peritoneal port RLQ of abdomen     HX GI      removal of peritoneal port     HX HYSTERECTOMY  1975    unknown ovaries    HX LAP CHOLECYSTECTOMY  2009    HX VASCULAR ACCESS  2013 and 4    multiple temporaries    HX VASCULAR ACCESS  8/27/14    permanent placement left forearm    HX VASCULAR ACCESS      Subclavian port in R chest wall (active)     VASCULAR SURGERY PROCEDURE UNLIST  8-27-14    L Brachial artery antecubital forearm loop fistula    VASCULAR SURGERY PROCEDURE UNLIST      L UA AV shunt placement (inactive)       Prior to Admission Medications   Prescriptions Last Dose Informant Patient Reported? Taking? Omega-3 Fatty Acids 300 mg cap   Yes No   Sig: Take  by mouth nightly.  Last dose 6/20/16   acetaminophen (TYLENOL) 325 mg tablet   No No   Sig: Take 2 Tabs by mouth every four (4) hours as needed for Fever (for fever greater than 100.4 F). allopurinol (ZYLOPRIM) 100 mg tablet   Yes No   Sig: Take  by mouth every morning. clopidogrel (PLAVIX) 75 mg tablet   Yes No   Sig: Take  by mouth daily. cyanocobalamin (VITAMIN B-12) 1,000 mcg tablet   Yes No   Sig: Take 1,000 mcg by mouth every morning. fludrocortisone (FLORINEF) 0.1 mg tablet   Yes No   Sig: Take  by mouth nightly.   gabapentin (NEURONTIN) 100 mg capsule   Yes No   Sig: Take 200 mg by mouth three (3) times daily. insulin glargine (LANTUS) 100 unit/mL injection   Yes No   Si Units by SubCUTAneous route nightly. levothyroxine (SYNTHROID) 175 mcg tablet   Yes No   Sig: Take 175 mcg by mouth Daily (before breakfast). midodrine (PROAMITINE) 2.5 mg tablet   Yes No   Sig: Take  by mouth three (3) times daily (with meals). pantoprazole (PROTONIX) 40 mg tablet   Yes No   Sig: Take 40 mg by mouth daily. sevelamer carbonate (RENVELA) 800 mg tab tab   Yes No   Sig: Take 800 mg by mouth three (3) times daily (with meals).       Facility-Administered Medications: None     Allergies   Allergen Reactions    Codeine Other (comments)     Pt can not recall type of reaction -- only remembers being told allergic       Social History   Substance Use Topics    Smoking status: Never Smoker    Smokeless tobacco: Never Used    Alcohol use No      Family History   Problem Relation Age of Onset    Heart Disease Mother     Diabetes Mother     Lung Disease Mother     Heart Disease Father     Stroke Father     Diabetes Father     Cancer Sister     Diabetes Sister     Heart Disease Sister       Immunization History   Administered Date(s) Administered    TB Skin Test (PPD) Intradermal 2014, 2014       Objective:   Patient Vitals for the past 24 hrs:   Temp Pulse Resp BP SpO2   175 97.4 °F (36.3 °C) 69 16 169/76 98 %   17 97.8 °F (36.6 °C) 67 - 153/88 98 %   06/30/17 1851 - - - 160/68 98 %   06/30/17 1818 - 66 - 170/70 97 %   06/30/17 1758 - 65 - 168/74 97 %   06/30/17 1745 - 65 - 164/72 97 %   06/30/17 1715 - - - - 98 %   06/30/17 1708 97.6 °F (36.4 °C) 84 18 173/61 91 %     Oxygen Therapy  O2 Sat (%): 98 % (06/30/17 2055)  Pulse via Oximetry: 67 beats per minute (06/30/17 2001)  O2 Device: Nasal cannula (06/30/17 2001)  O2 Flow Rate (L/min): 2 l/min (06/30/17 2001)  No intake or output data in the 24 hours ending 06/30/17 2122    Physical Exam:  General:    Well nourished. Alert. Seated upright in bed, O2 via NC  Eyes:   Normal sclera. Extraocular movements intact. ENT:  Normocephalic, atraumatic. Moist mucous membranes  CV:   RRR. No murmur, rub, or gallop. Lungs:  CTAB. bibasilar crackles  Abdomen: Soft, nontender, nondistended. Bowel sounds normal.   Extremities: Warm and dry. No cyanosis. Minimal LE edema  Neurologic: CN II-XII grossly intact. Sensation intact. Skin:     No rashes or jaundice. No wounds. Psych:  Normal mood and affect. I reviewed the labs, imaging, EKGs, telemetry, and other studies done this admission.   Data Review:   Recent Results (from the past 24 hour(s))   MAGNESIUM    Collection Time: 06/30/17  5:02 PM   Result Value Ref Range    Magnesium 1.9 1.8 - 2.4 mg/dL   PHOSPHORUS    Collection Time: 06/30/17  5:02 PM   Result Value Ref Range    Phosphorus 2.2 (L) 2.3 - 3.7 MG/DL   CBC WITH AUTOMATED DIFF    Collection Time: 06/30/17  5:12 PM   Result Value Ref Range    WBC 6.4 4.3 - 11.1 K/uL    RBC 3.22 (L) 4.05 - 5.25 M/uL    HGB 10.1 (L) 11.7 - 15.4 g/dL    HCT 31.5 (L) 35.8 - 46.3 %    MCV 97.8 79.6 - 97.8 FL    MCH 31.4 26.1 - 32.9 PG    MCHC 32.1 31.4 - 35.0 g/dL    RDW 15.2 (H) 11.9 - 14.6 %    PLATELET 859 (L) 819 - 450 K/uL    MPV 9.2 (L) 10.8 - 14.1 FL    DF AUTOMATED      NEUTROPHILS 71 43 - 78 %    LYMPHOCYTES 19 13 - 44 %    MONOCYTES 7 4.0 - 12.0 %    EOSINOPHILS 2 0.5 - 7.8 % BASOPHILS 1 0.0 - 2.0 %    IMMATURE GRANULOCYTES 0.5 0.0 - 5.0 %    ABS. NEUTROPHILS 4.6 1.7 - 8.2 K/UL    ABS. LYMPHOCYTES 1.2 0.5 - 4.6 K/UL    ABS. MONOCYTES 0.5 0.1 - 1.3 K/UL    ABS. EOSINOPHILS 0.1 0.0 - 0.8 K/UL    ABS. BASOPHILS 0.0 0.0 - 0.2 K/UL    ABS. IMM. GRANS. 0.0 0.0 - 0.5 K/UL   METABOLIC PANEL, COMPREHENSIVE    Collection Time: 06/30/17  5:12 PM   Result Value Ref Range    Sodium 142 136 - 145 mmol/L    Potassium 3.3 (L) 3.5 - 5.1 mmol/L    Chloride 100 98 - 107 mmol/L    CO2 35 (H) 21 - 32 mmol/L    Anion gap 7 7 - 16 mmol/L    Glucose 115 (H) 65 - 100 mg/dL    BUN 18 8 - 23 MG/DL    Creatinine 3.51 (H) 0.6 - 1.0 MG/DL    GFR est AA 17 (L) >60 ml/min/1.73m2    GFR est non-AA 14 (L) >60 ml/min/1.73m2    Calcium 9.3 8.3 - 10.4 MG/DL    Bilirubin, total 0.5 0.2 - 1.1 MG/DL    ALT (SGPT) 19 12 - 65 U/L    AST (SGOT) 13 (L) 15 - 37 U/L    Alk. phosphatase 197 (H) 50 - 136 U/L    Protein, total 7.5 6.3 - 8.2 g/dL    Albumin 3.0 (L) 3.2 - 4.6 g/dL    Globulin 4.5 (H) 2.3 - 3.5 g/dL    A-G Ratio 0.7 (L) 1.2 - 3.5     BNP    Collection Time: 06/30/17  5:12 PM   Result Value Ref Range     pg/mL   TROPONIN I    Collection Time: 06/30/17  5:12 PM   Result Value Ref Range    Troponin-I, Qt. 0.03 0.02 - 0.05 NG/ML   EKG, 12 LEAD, INITIAL    Collection Time: 06/30/17  5:46 PM   Result Value Ref Range    Ventricular Rate 66 BPM    Atrial Rate 66 BPM    P-R Interval 198 ms    QRS Duration 168 ms    Q-T Interval 454 ms    QTC Calculation (Bezet) 475 ms    Calculated P Axis 63 degrees    Calculated R Axis 78 degrees    Calculated T Axis -71 degrees    Diagnosis       !! AGE AND GENDER SPECIFIC ECG ANALYSIS !!   Normal sinus rhythm  Left bundle branch block  Abnormal ECG  When compared with ECG of 16-DEC-2015 16:18,  Premature ventricular complexes are no longer Present     POC TROPONIN-I    Collection Time: 06/30/17  7:23 PM   Result Value Ref Range    Troponin-I (POC) 0.03 0.0 - 0.08 ng/ml       Imaging Studies:  CXR Results  (Last 48 hours)               06/30/17 1843  XR CHEST PA LAT Final result    Impression:  Impression:  Acute mild pulmonary edema versus bibasilar atelectasis and or   consolidation. Narrative:  PA and lateral chest radiographs       History: dyspnea; on HD, 81 years Female       Comparison: Chest radiograph December 16, 2015       Findings: Interval removal of the left chest wall mita catheter. Persistent   mild apparent cardiomegaly. Persistent low lung volumes. Increased mild   bilateral perihilar and basilar airspace opacities, which may represent mild   pulmonary edema or atelectasis and or consolidation. No evidence of   pneumothorax, pleural effusion. Visualized soft tissue and osseous structures   otherwise unremarkable.                  CT Results  (Last 48 hours)    None          Assessment and Plan:     Hospital Problems as of 6/30/2017  Date Reviewed: 11/10/2016          Codes Class Noted - Resolved POA    * (Principal)Pulmonary edema ICD-10-CM: J81.1  ICD-9-CM: 573  6/30/2017 - Present Unknown        ESRD on hemodialysis ([facility] ICD-10-CM: N18.6, Z99.2  ICD-9-CM: 585.6, V45.11  8/28/2014 - Present Yes        Chronic diastolic heart failure (HCC) ICD-10-CM: I50.32  ICD-9-CM: 428.32  Unknown - Present Yes        HTN (hypertension), ESSENTIAL ICD-10-CM: I10  ICD-9-CM: 401.9  Unknown - Present Yes        Coronary atherosclerosis of native coronary vessel ICD-10-CM: I25.10  ICD-9-CM: 414.01  Unknown - Present Yes        CASSIE (obstructive sleep apnea) ICD-10-CM: G47.33  ICD-9-CM: 327.23  7/16/2014 - Present Yes    Overview Signed 7/16/2014  1:18 AM by Ender Gifford NP     BIPAP             DM (diabetes mellitus) ([facility] ICD-10-CM: E11.9  ICD-9-CM: 250.00  7/16/2014 - Present Yes        Hypothyroidism ICD-10-CM: E03.9  ICD-9-CM: 244.9  6/25/2014 - Present Yes        RESOLVED: ESRD on peritoneal dialysis ([facility] ICD-10-CM: N18.6, Z99.2  ICD-9-CM: 585.6, V45.11  7/16/2014 - 6/30/2017 Yes    Overview Signed 7/16/2014  1:19 AM by Gigi Cheng NP     STARTED 9/2013                   PLAN:  · Admit inpatient, remote telemetry  · Trend troponin  · Consult nephrology in am for hemodialysis  · Echocardiogram in am  · Check baseline ABG.  Continue supplemental O2 if indicated  · Resume BIPAP HS  · Check TSH, T4, procalcitonin  · Continue diabetes management  · SQ heparin for DVT prophylaxis      Estimated LOS: greater than 2 midnights     Signed:  Igor Bragg MD

## 2017-07-01 NOTE — PROGRESS NOTES
Admission assessment completed. Pt transported from ED. AAO x 4. RR even and unlabored. On 2 L via NC. Lung sounds diminished. Dyspnea with exertion, aware to call for assistance when ambulating. Bed locked, in low position, call light in reach. Will monitor. Skin assessment completed with La Nena Zapata LPN. Redness to left lower leg, states it is from \"calcifaction but it's almost gone\". Otherwise skin is intact.

## 2017-07-01 NOTE — DISCHARGE SUMMARY
Hospitalist Discharge Summary     Patient ID:  Luciano Monaco  [id number]  81 y.o.  1945  Admit date: 6/30/2017  5:12 PM  Discharge date and time: 7/1/2017  Attending: Hira Michael MD  PCP:  Reno Beck MD  Treatment Team: Attending Provider: Hira Michael MD; Consulting Provider: Minh Shepherd MD    Principal Diagnosis Pulmonary edema   Principal Problem:    Pulmonary edema (6/30/2017)    Active Problems:    Hypothyroidism (6/25/2014)      CASSIE (obstructive sleep apnea) (7/16/2014)      Overview: BIPAP      DM (diabetes mellitus) ([facility] [address] (7/16/2014)      ESRD on hemodialysis ([facility] [address] (8/28/2014)      Chronic diastolic heart failure (HCC) ()      HTN (hypertension), ESSENTIAL ()      Coronary atherosclerosis of native coronary vessel ()             Hospital Course:  Please refer to the admission H&P for details of presentation. In summary, the patient is a 80 yo ESRD pt who presented to ER with SOB x 1 week. Pt underwent her normal HD run prior to presenting to ER. Chest xray reveals acute mild pulm edema. No noted hypoxia but pt was placed on 2 L NC. She underwent an additional HD run this morning. She now states feeling well, denies SOB and is requesting home. She should follow up with PCP in 1 week. Additionally I have decreased her Lantus to 30 units due to borderline BS levels and A1C 4.9. Significant Diagnostic Studies:     06/30 Chest X Ray Impression:  Acute mild pulmonary edema versus bibasilar atelectasis and or consolidation.       Labs: Results:       Chemistry Recent Labs      07/01/17   0619  06/30/17   1712   GLU  74  115*   NA  139  142   K  3.4*  3.3*   CL  97*  100   CO2  33*  35*   BUN  26*  18   CREA  4.77*  3.51*   CA  9.4  9.3   AGAP  9  7   AP   --   197*   TP   --   7.5   ALB   --   3.0*   GLOB   --   4.5*   AGRAT   --   0.7*      CBC w/Diff Recent Labs      07/01/17   0619  06/30/17   1712   WBC  5.8  6.4   RBC  3.02*  3.22*   HGB  9.4*  10.1*   HCT  30.4* 31.5*   PLT  118*  123*   GRANS   --   71   LYMPH   --   19   EOS   --   2      Cardiac Enzymes No results for input(s): CPK, CKND1, NANETTE in the last 72 hours. No lab exists for component: CKRMB, TROIP   Coagulation No results for input(s): PTP, INR, APTT in the last 72 hours. No lab exists for component: INREXT    Lipid Panel Lab Results   Component Value Date/Time    Cholesterol, total 192 08/29/2014 05:28 AM    HDL Cholesterol 30 08/29/2014 05:28 AM    LDL, calculated 103.2 08/29/2014 05:28 AM    VLDL, calculated 58.8 08/29/2014 05:28 AM    Triglyceride 294 08/29/2014 05:28 AM    CHOL/HDL Ratio 6.4 08/29/2014 05:28 AM      BNP No results for input(s): BNPP in the last 72 hours. Liver Enzymes Recent Labs      06/30/17   1712   TP  7.5   ALB  3.0*   AP  197*   SGOT  13*      Thyroid Studies Lab Results   Component Value Date/Time    TSH 1.170 07/01/2017 06:19 AM            Discharge Exam:  Visit Vitals    /61    Pulse 76    Temp 98.3 °F (36.8 °C)    Resp (!) 2    Ht 5' 2\" (1.575 m)    Wt 98.4 kg (217 lb)    SpO2 97%    BMI 39.69 kg/m2     General appearance: alert, cooperative, no distress, appears stated age  Lungs: clear to auscultation bilaterally  Heart: regular rate and rhythm, S1, S2 normal, no murmur, click, rub or gallop  Abdomen: soft, non-tender. Bowel sounds normal. No masses,  no organomegaly  Extremities: no cyanosis or edema  Neurologic: Grossly normal    Disposition: Home   Discharge Condition: stable  Patient Instructions:   Current Discharge Medication List      CONTINUE these medications which have CHANGED    Details   insulin glargine (LANTUS) 100 unit/mL injection 30 Units by SubCUTAneous route daily. Qty: 1 Vial, Refills: 0         CONTINUE these medications which have NOT CHANGED    Details   midodrine (PROAMITINE) 2.5 mg tablet Take  by mouth three (3) times daily (with meals). pantoprazole (PROTONIX) 40 mg tablet Take 40 mg by mouth daily.       clopidogrel (PLAVIX) 75 mg tablet Take  by mouth daily. Omega-3 Fatty Acids 300 mg cap Take  by mouth nightly. Last dose 6/20/16      allopurinol (ZYLOPRIM) 100 mg tablet Take  by mouth every morning.      gabapentin (NEURONTIN) 100 mg capsule Take 200 mg by mouth three (3) times daily. levothyroxine (SYNTHROID) 175 mcg tablet Take 175 mcg by mouth Daily (before breakfast). fludrocortisone (FLORINEF) 0.1 mg tablet Take  by mouth nightly. acetaminophen (TYLENOL) 325 mg tablet Take 2 Tabs by mouth every four (4) hours as needed for Fever (for fever greater than 100.4 F). Qty: 30 Tab, Refills: 0      sevelamer carbonate (RENVELA) 800 mg tab tab Take 800 mg by mouth three (3) times daily (with meals). cyanocobalamin (VITAMIN B-12) 1,000 mcg tablet Take 1,000 mcg by mouth every morning.            Activity: Regular, as tolerated   Diet: Renal, DM     Follow-up  ·   PCP in 1-2 weeks   Time spent to discharge patient greater than 30 minutes  Signed:  Alexandria Hickey NP  7/1/2017  3:01 PM

## 2017-07-01 NOTE — DIALYSIS
Hemodialysis treatment initiated using Left FAF and 15 g needles by Oswaldo Harm. Pt alert and VS stable. Machine settings per MD order. Will monitor during treatment.

## 2017-07-01 NOTE — ED NOTES
TRANSFER - OUT REPORT:    Verbal report given to Dave Montaño on Moon Mcclellan  being transferred to [address] [place] [address] for routine progression of care       Report consisted of patients Situation, Background, Assessment and   Recommendations(SBAR). Information from the following report(s) SBAR, ED Summary, Procedure Summary, Intake/Output, MAR, Recent Results and Cardiac Rhythm sr was reviewed with the receiving nurse. Lines:   Venous Access Device 07/15/14 Upper chest (subclavicular area), left (Active)       Peripheral IV 06/30/17 Right Antecubital (Active)   Site Assessment Clean, dry, & intact 6/30/2017  7:31 PM   Phlebitis Assessment 0 6/30/2017  7:31 PM   Infiltration Assessment 0 6/30/2017  7:31 PM   Dressing Status Clean, dry, & intact 6/30/2017  7:31 PM        Opportunity for questions and clarification was provided.       Patient transported with:   Monitor  O2 @ 2 liters  Tech

## 2017-07-01 NOTE — DIALYSIS
Machine setup clotted. Machine set back up and treatment restarted without difficulty. Will continue to monitor.

## 2017-07-01 NOTE — PROGRESS NOTES
Dialysis treatment completed at 2:30 pm.  Pt tolerated well. 3 Kgs removed. Two 15 ga needles removed from access and manual pressure held until hemostasis complete and pressure dressing applied. Pt alert and oriented x 4; Vital signs stable. Pt to room after dialysis completed.

## 2017-07-02 ENCOUNTER — APPOINTMENT (OUTPATIENT)
Dept: GENERAL RADIOLOGY | Age: 72
DRG: 291 | End: 2017-07-02
Attending: NURSE PRACTITIONER
Payer: MEDICARE

## 2017-07-02 LAB
ANION GAP BLD CALC-SCNC: 9 MMOL/L (ref 7–16)
BUN SERPL-MCNC: 30 MG/DL (ref 8–23)
CALCIUM SERPL-MCNC: 9 MG/DL (ref 8.3–10.4)
CHLORIDE SERPL-SCNC: 101 MMOL/L (ref 98–107)
CO2 SERPL-SCNC: 29 MMOL/L (ref 21–32)
CREAT SERPL-MCNC: 5.68 MG/DL (ref 0.6–1)
GLUCOSE BLD STRIP.AUTO-MCNC: 123 MG/DL (ref 65–100)
GLUCOSE BLD STRIP.AUTO-MCNC: 131 MG/DL (ref 65–100)
GLUCOSE BLD STRIP.AUTO-MCNC: 147 MG/DL (ref 65–100)
GLUCOSE BLD STRIP.AUTO-MCNC: 156 MG/DL (ref 65–100)
GLUCOSE SERPL-MCNC: 141 MG/DL (ref 65–100)
MM INDURATION POC: NORMAL MM (ref 0–5)
POTASSIUM SERPL-SCNC: 4.5 MMOL/L (ref 3.5–5.1)
PPD POC: 0 NEGATIVE
SODIUM SERPL-SCNC: 139 MMOL/L (ref 136–145)

## 2017-07-02 PROCEDURE — 74011250637 HC RX REV CODE- 250/637: Performed by: INTERNAL MEDICINE

## 2017-07-02 PROCEDURE — 77010033678 HC OXYGEN DAILY

## 2017-07-02 PROCEDURE — 94760 N-INVAS EAR/PLS OXIMETRY 1: CPT

## 2017-07-02 PROCEDURE — 80048 BASIC METABOLIC PNL TOTAL CA: CPT | Performed by: NURSE PRACTITIONER

## 2017-07-02 PROCEDURE — 94761 N-INVAS EAR/PLS OXIMETRY MLT: CPT

## 2017-07-02 PROCEDURE — 36415 COLL VENOUS BLD VENIPUNCTURE: CPT | Performed by: NURSE PRACTITIONER

## 2017-07-02 PROCEDURE — 65270000029 HC RM PRIVATE

## 2017-07-02 PROCEDURE — 74011250637 HC RX REV CODE- 250/637: Performed by: HOSPITALIST

## 2017-07-02 PROCEDURE — 71010 XR CHEST PORT: CPT

## 2017-07-02 PROCEDURE — 74011636637 HC RX REV CODE- 636/637: Performed by: INTERNAL MEDICINE

## 2017-07-02 PROCEDURE — 82962 GLUCOSE BLOOD TEST: CPT

## 2017-07-02 RX ORDER — INSULIN LISPRO 100 [IU]/ML
INJECTION, SOLUTION INTRAVENOUS; SUBCUTANEOUS
Status: DISCONTINUED | OUTPATIENT
Start: 2017-07-02 | End: 2017-07-03 | Stop reason: HOSPADM

## 2017-07-02 RX ORDER — CARVEDILOL 3.12 MG/1
3.12 TABLET ORAL 2 TIMES DAILY WITH MEALS
Status: DISCONTINUED | OUTPATIENT
Start: 2017-07-02 | End: 2017-07-03 | Stop reason: HOSPADM

## 2017-07-02 RX ORDER — CYCLOBENZAPRINE HCL 10 MG
5 TABLET ORAL DAILY PRN
Status: DISCONTINUED | OUTPATIENT
Start: 2017-07-02 | End: 2017-07-03 | Stop reason: HOSPADM

## 2017-07-02 RX ORDER — INSULIN GLARGINE 100 [IU]/ML
30 INJECTION, SOLUTION SUBCUTANEOUS
Status: DISCONTINUED | OUTPATIENT
Start: 2017-07-02 | End: 2017-07-03 | Stop reason: HOSPADM

## 2017-07-02 RX ORDER — NYSTATIN 100000 [USP'U]/G
POWDER TOPICAL 2 TIMES DAILY
Status: DISCONTINUED | OUTPATIENT
Start: 2017-07-02 | End: 2017-07-03 | Stop reason: HOSPADM

## 2017-07-02 RX ADMIN — NYSTATIN: 100000 POWDER TOPICAL at 17:15

## 2017-07-02 RX ADMIN — GABAPENTIN 200 MG: 100 CAPSULE ORAL at 09:05

## 2017-07-02 RX ADMIN — CYANOCOBALAMIN TAB 1000 MCG 1000 MCG: 1000 TAB at 09:05

## 2017-07-02 RX ADMIN — CYCLOBENZAPRINE HYDROCHLORIDE 5 MG: 10 TABLET, FILM COATED ORAL at 17:13

## 2017-07-02 RX ADMIN — NYSTATIN: 100000 POWDER TOPICAL at 12:15

## 2017-07-02 RX ADMIN — INSULIN LISPRO 2 UNITS: 100 INJECTION, SOLUTION INTRAVENOUS; SUBCUTANEOUS at 12:16

## 2017-07-02 RX ADMIN — Medication 10 ML: at 15:19

## 2017-07-02 RX ADMIN — Medication 10 ML: at 05:41

## 2017-07-02 RX ADMIN — CARVEDILOL 3.12 MG: 3.12 TABLET, FILM COATED ORAL at 17:13

## 2017-07-02 RX ADMIN — ALLOPURINOL 100 MG: 100 TABLET ORAL at 09:05

## 2017-07-02 RX ADMIN — GABAPENTIN 200 MG: 100 CAPSULE ORAL at 20:30

## 2017-07-02 RX ADMIN — LEVOTHYROXINE SODIUM 175 MCG: 125 TABLET ORAL at 05:41

## 2017-07-02 RX ADMIN — RENAGEL 800 MG: 400 TABLET ORAL at 05:42

## 2017-07-02 RX ADMIN — PANTOPRAZOLE SODIUM 40 MG: 40 TABLET, DELAYED RELEASE ORAL at 09:05

## 2017-07-02 RX ADMIN — Medication 10 ML: at 20:30

## 2017-07-02 RX ADMIN — INSULIN GLARGINE 30 UNITS: 100 INJECTION, SOLUTION SUBCUTANEOUS at 20:35

## 2017-07-02 RX ADMIN — GABAPENTIN 200 MG: 100 CAPSULE ORAL at 17:13

## 2017-07-02 RX ADMIN — RENAGEL 800 MG: 400 TABLET ORAL at 12:15

## 2017-07-02 RX ADMIN — RENAGEL 800 MG: 400 TABLET ORAL at 17:13

## 2017-07-02 NOTE — CONSULTS
[address]       Name:  Efrain Coffey   MR#:  [id number]   :  1945   Account #:  [de-identified]   Date of Adm:  2017       REASON FOR CONSULTATION: End-stage renal disease, in dialysis,   maybe pulmonary edema. HISTORY OF THE PRESENT ILLNESS: This patient is a 74-year-old   female with a history of end-stage renal disease on hemodialysis   Monday, Wednesday, Friday , obstructive sleep apnea. She came yesterday and admitted because of shortness of breath   which started 3 days ago, not relieved with dialysis. On   ultrafiltration despite the decrease of dry weight by 1 kg   below. The patient has shortness of breath with minimal exertion,   orthopnea. No lower extremity edema. She has left-sided chest   pain, but that is on-and-off, old, not new. No fever, no chills,   no cough. No leg swelling. The patient denied any other symptoms, like nausea, vomiting,   abdominal pain, diarrhea, GI bleed. No dysuria or gross   hematuria. REVIEW OF SYSTEMS: As above. PREVIOUS MEDICAL HISTORY: History of end-stage renal disease on   hemodialysis Monday, Wednesday, Friday. History of   obstructive sleep apnea. History of anemia, arthritis, obstructive   sleep apnea, coronary artery disease, CVA, type 2 diabetes   mellitus, gout. The patient with congestive heart failure here. I do not have documentation. Obesity. SURGICAL HISTORY: Hysterectomy, cholecystectomy. ALLERGIES: CODEINE. SOCIAL HISTORY: The patient never smoked. No alcohol abuse. PHYSICAL EXAMINATION   VITAL SIGNS: Temperature is 97.5. Heart rate 68. Blood pressure   145/68. The patient is requiring O2 supplement at 2 liters per   minutes. She came with hypoxia with oxygen saturation in the 80s   and now 97%. GENERAL: She is not in acute respiratory distress at rest. She is   obese. NECK: No lymph nodes, no thyromegaly. Obese. LUNGS: I do not hear any wheezing, no crackles.  There are decreased breath sounds, full thoracic expansion. HEART: Regular rhythm. Systolic murmur 2/6, left sternal border, no   rub. ABDOMEN: Obese pain, soft, no organomegaly, no masses. EXTREMITIES: There is trace edema. IMPRESSION    1. Shortness of breath with some evidence of lung infiltrates, maybe   edema, on the chest x-ray, with hypoxia. There is no clear   evidence of pulmonary embolus. 2. End-stage renal disease. 3. Type 2 diabetes mellitus. 4. Obstructive sleep apnea. RECOMMENDATIONS: Will try to decrease further the dry weight to   relieve the lung edema. The patient is scheduled to have   echocardiogram to assess the cardiac function. Will follow with   chest x-ray after dialysis to see if there is any improvement of   the infiltrate.          MD MARIN Coker / Jareth Morejon   D:  07/01/2017   10:03   T:  07/02/2017   01:18   Job #:  [id number]

## 2017-07-02 NOTE — PROGRESS NOTES
PM assessment completed. Pt sitting up in chair. Denies any pain. No s/s of distress. On 2 L via NC. Aware to call for assistance when ambulating. Call light in reach. Will monitor.

## 2017-07-02 NOTE — PROGRESS NOTES
Admit Date: 6/30/2017      Subjective:          Review of Systems  Cardio-vascular: no chest pain, SOB better  GI: no N/V/D  : no dysuria, no hematuria    Objective:     Patient Vitals for the past 8 hrs:   BP Temp Pulse Resp SpO2 Weight   07/02/17 0752 140/63 98.1 °F (36.7 °C) 69 18 96 % -   07/02/17 0730 - - - - 92 % -   07/02/17 0556 - - - - - 93.6 kg (206 lb 4.8 oz)   07/02/17 0323 150/68 98 °F (36.7 °C) 72 16 99 % -            Physical Exam:   Lungs: decreased BS  CV: RR,   Abdomen: soft, not tender, no rebound. Ext: trace  edema          Data Review   Recent Results (from the past 8 hour(s))   GLUCOSE, POC    Collection Time: 07/02/17  5:43 AM   Result Value Ref Range    Glucose (POC) 123 (H) 65 - 100 mg/dL           Assessment:     Principal Problem:    Pulmonary edema (6/30/2017)    Active Problems:    Hypothyroidism (6/25/2014)      CASSIE (obstructive sleep apnea) (7/16/2014)      Overview: BIPAP      DM (diabetes mellitus) ([facility] [address] (7/16/2014)      ESRD on hemodialysis ([facility] [address] (8/28/2014)      Chronic diastolic heart failure (HCC) ()      HTN (hypertension), ESSENTIAL ()      Coronary atherosclerosis of native coronary vessel ()        Plan:   Chest xary better, but Still desaturation.   EF 40%  Dialysis again in am         Jefe Mcgarry MD

## 2017-07-02 NOTE — PROGRESS NOTES
Called into the room pt states she is cramping in her legs and then arms, Michael Silverman was paged and notified, stat BMP was ordered and lab called to inform them of stat orders. Will continue to monitor.

## 2017-07-02 NOTE — PROGRESS NOTES
PM assessment completed. Pt drowsy but AAO x 4. Flexeril given at 1713. RR even and unlabored. Denies any pain. Aware to call for assistance when needed. Bed locked, in low position, call light in reach. Will monitor.

## 2017-07-02 NOTE — PROGRESS NOTES
Oxygen Qualifier       Room air: SpO2 with O2 and liter flow   Resting SpO2  87%  95% on 2L   Ambulating SpO2   93% on 2L     Pt in a stable chronic state    Completed by:    Ez Lambert, RT

## 2017-07-02 NOTE — PROGRESS NOTES
Assisted pt to restroom. Gait unsteady but able to ambulate to restroom slowly with minimal assistance. Assisted back to bed. Tolerated well. Will continue to monitor.

## 2017-07-02 NOTE — PROGRESS NOTES
Hospitalist Progress Note    2017  Admit Date: 2017  5:12 PM   NAME: Elsy Marie   :  1945   MRN:  [id number]   Attending: Edie Back MD  PCP:  Cindy Knight MD    SUBJECTIVE:       Ms. Devika Walls is a 82 yo ESRD pt who presented to ER  with SOB x 1 week. Pt underwent her normal HD run prior to presenting to ER. Chest xray revealed acute mild pulm edema. She underwent an additional HD run Saturday (typically runs MWF). After HD repeat chest x ray shows improvement of pulm edema. Pt noted with 02 sats 89% at rest and 87% during ambulation. She has never required 02 in the past and does not complain of SOB during this event. Echo reveals EF 40-45%. This morning pt is sitting up in bed. Noted with nose bleed. She denies any pain and states SOB feels improved.  at bedside. Review of Systems negative with exception of pertinent positives noted above  PHYSICAL EXAM     Visit Vitals    /63    Pulse 69    Temp 98.1 °F (36.7 °C)    Resp 18    Ht 5' 2\" (1.575 m)    Wt 93.6 kg (206 lb 4.8 oz)    SpO2 96%    BMI 37.73 kg/m2      Temp (24hrs), Av °F (36.7 °C), Min:97.7 °F (36.5 °C), Max:98.1 °F (36.7 °C)    Oxygen Therapy  O2 Sat (%): 96 % (17 0752)  Pulse via Oximetry: 72 beats per minute (17)  O2 Device: Nasal cannula (17)  O2 Flow Rate (L/min): 2 l/min (17)    Intake/Output Summary (Last 24 hours) at 17 1123  Last data filed at 17 1429   Gross per 24 hour   Intake                0 ml   Output             3000 ml   Net            -3000 ml      General: No acute distress    Lungs:  CTA Bilaterally.    Heart:  Regular rate and rhythm,  No murmur, rub, or gallop  Abdomen: Soft, obese,  Non distended, Non tender, Positive bowel sounds  Extremities: No cyanosis, clubbing or edema  Neurologic:  No focal deficits    ASSESSMENT      Active Hospital Problems    Diagnosis Date Noted    Pulmonary edema 06/30/2017    Chronic diastolic heart failure (HCC)     HTN (hypertension), ESSENTIAL     Coronary atherosclerosis of native coronary vessel     ESRD on hemodialysis ([facility] [address] 08/28/2014    CASSIE (obstructive sleep apnea) 07/16/2014     BIPAP      DM (diabetes mellitus) ([facility] [address] 07/16/2014    Hypothyroidism 06/25/2014     A/P:    Hypoxia, pulm edema with ESRD- Plan for HD tomorrow. Wean 02 for sats > 90. Encourage ambulation. If pt does not improve after HD tomorrow may be d/nicole home with home 02. CHF- New finding. Will trial low dose Coreg but monitor BP closely. Recommend outpatient cards. ESRD- Neprho following     DM 2- A1C 4.9. Will decrease Lantus dose to 30 units q hs.       DC planning- [facility] tomorrow after HD     DVT Prophylaxis: SCDs, Heparin held due to epistaxis this morning     Signed By: Marlene Harrison NP     July 2, 2017

## 2017-07-02 NOTE — PROGRESS NOTES
Gave flexeril po for cramps. Pt states \"cramps were the worse she had ever had\" will continue to monitor.

## 2017-07-03 VITALS
RESPIRATION RATE: 18 BRPM | TEMPERATURE: 97.8 F | BODY MASS INDEX: 38.52 KG/M2 | DIASTOLIC BLOOD PRESSURE: 57 MMHG | HEIGHT: 62 IN | WEIGHT: 209.3 LBS | HEART RATE: 68 BPM | SYSTOLIC BLOOD PRESSURE: 138 MMHG | OXYGEN SATURATION: 92 %

## 2017-07-03 LAB
ANION GAP BLD CALC-SCNC: 6 MMOL/L (ref 7–16)
BNP SERPL-MCNC: 210 PG/ML
BUN SERPL-MCNC: 40 MG/DL (ref 8–23)
CALCIUM SERPL-MCNC: 9.4 MG/DL (ref 8.3–10.4)
CHLORIDE SERPL-SCNC: 101 MMOL/L (ref 98–107)
CO2 SERPL-SCNC: 32 MMOL/L (ref 21–32)
CREAT SERPL-MCNC: 6.9 MG/DL (ref 0.6–1)
GLUCOSE BLD STRIP.AUTO-MCNC: 131 MG/DL (ref 65–100)
GLUCOSE BLD STRIP.AUTO-MCNC: 132 MG/DL (ref 65–100)
GLUCOSE BLD STRIP.AUTO-MCNC: 132 MG/DL (ref 65–100)
GLUCOSE SERPL-MCNC: 131 MG/DL (ref 65–100)
POTASSIUM SERPL-SCNC: 4.6 MMOL/L (ref 3.5–5.1)
SODIUM SERPL-SCNC: 139 MMOL/L (ref 136–145)

## 2017-07-03 PROCEDURE — 83880 ASSAY OF NATRIURETIC PEPTIDE: CPT | Performed by: NURSE PRACTITIONER

## 2017-07-03 PROCEDURE — 90935 HEMODIALYSIS ONE EVALUATION: CPT

## 2017-07-03 PROCEDURE — 36415 COLL VENOUS BLD VENIPUNCTURE: CPT | Performed by: NURSE PRACTITIONER

## 2017-07-03 PROCEDURE — 82962 GLUCOSE BLOOD TEST: CPT

## 2017-07-03 PROCEDURE — 80048 BASIC METABOLIC PNL TOTAL CA: CPT | Performed by: NURSE PRACTITIONER

## 2017-07-03 PROCEDURE — 74011250637 HC RX REV CODE- 250/637: Performed by: HOSPITALIST

## 2017-07-03 RX ORDER — CARVEDILOL 3.12 MG/1
3.12 TABLET ORAL 2 TIMES DAILY WITH MEALS
Qty: 60 TAB | Refills: 0 | Status: SHIPPED | OUTPATIENT
Start: 2017-07-03 | End: 2017-07-20 | Stop reason: SDUPTHER

## 2017-07-03 RX ADMIN — Medication 10 ML: at 05:23

## 2017-07-03 RX ADMIN — LEVOTHYROXINE SODIUM 175 MCG: 125 TABLET ORAL at 05:24

## 2017-07-03 RX ADMIN — CYANOCOBALAMIN TAB 1000 MCG 1000 MCG: 1000 TAB at 12:44

## 2017-07-03 RX ADMIN — RENAGEL 800 MG: 400 TABLET ORAL at 05:24

## 2017-07-03 RX ADMIN — ALLOPURINOL 100 MG: 100 TABLET ORAL at 12:44

## 2017-07-03 RX ADMIN — Medication 10 ML: at 15:04

## 2017-07-03 RX ADMIN — NYSTATIN: 100000 POWDER TOPICAL at 12:47

## 2017-07-03 RX ADMIN — RENAGEL 800 MG: 400 TABLET ORAL at 12:44

## 2017-07-03 NOTE — DIALYSIS
Hemodialysis treatment initiated using Left LAG and 15 g needles by Rossy Mitchell. Pt alert and VS stable. Heparin 3000 unit bolus and mini set 500 units/hour. Machine settings per MD order. Will monitor during treatment.

## 2017-07-03 NOTE — PROGRESS NOTES
Discharge instructions and prescriptions provided and explained to the pt. Med side effect sheet reviewed. Opportunity for questions provided. Waiting on oxygen to be delivered. Instructed to call once ready to leave.

## 2017-07-03 NOTE — PROGRESS NOTES
[facility]    Follow-Up on: ESRD    HPI: Seen on HD. Breathing is a little bit better. ROS:  Denies CP, +SOB.     Current Facility-Administered Medications   Medication Dose Route Frequency    nystatin (MYCOSTATIN) 100,000 unit/gram powder   Topical BID    insulin glargine (LANTUS) injection 30 Units  30 Units SubCUTAneous QHS    insulin lispro (HUMALOG) injection   SubCUTAneous TIDAC    carvedilol (COREG) tablet 3.125 mg  3.125 mg Oral BID WITH MEALS    cyclobenzaprine (FLEXERIL) tablet 5 mg  5 mg Oral DAILY PRN    READ PPD  1 Each Other Q24H    sodium chloride (NS) flush 5-10 mL  5-10 mL IntraVENous Q8H    sodium chloride (NS) flush 5-10 mL  5-10 mL IntraVENous PRN    acetaminophen (TYLENOL) tablet 650 mg  650 mg Oral Q4H PRN    diphenhydrAMINE (BENADRYL) capsule 25 mg  25 mg Oral Q4H PRN    ondansetron (ZOFRAN ODT) tablet 4 mg  4 mg Oral Q4H PRN    clopidogrel (PLAVIX) tablet 75 mg  75 mg Oral DAILY    fludrocortisone (FLORINEF) tablet 100 mcg  100 mcg Oral QHS    levothyroxine (SYNTHROID) tablet 175 mcg  175 mcg Oral ACB    midodrine (PROAMITINE) tablet 2.5 mg  2.5 mg Oral TID WITH MEALS    sevelamer (RENAGEL) tablet 800 mg  800 mg Oral TIDAC    pantoprazole (PROTONIX) tablet 40 mg  40 mg Oral DAILY    gabapentin (NEURONTIN) capsule 200 mg  200 mg Oral TID    cyanocobalamin tablet 1,000 mcg  1,000 mcg Oral DAILY    allopurinol (ZYLOPRIM) tablet 100 mg  100 mg Oral DAILY    dextrose 40% (GLUTOSE) oral gel 1 Tube  15 g Oral PRN    glucagon (GLUCAGEN) injection 1 mg  1 mg IntraMUSCular PRN    dextrose (D50W) injection syrg 12.5-25 g  25-50 mL IntraVENous PRN       Exam:  Vitals:    07/03/17 0310 07/03/17 0658 07/03/17 0723 07/03/17 0744   BP: 134/51  135/56 135/69   Pulse: 75  70 65   Resp: 12  18    Temp: 98.2 °F (36.8 °C)  97.6 °F (36.4 °C)    SpO2: 90%  96%    Weight:  94.9 kg (209 lb 4.8 oz)     Height:           No intake or output data in the 24 hours ending 07/03/17 9950  PE:  GEN - in no distress, obese  CV - regular, no murmur, no rub  Lung - clear bilaterally  Abd - soft, nontender  Ext - trace edema    Labs  Recent Labs      07/01/17   0619  06/30/17   1712   WBC  5.8  6.4   HGB  9.4*  10.1*   HCT  30.4*  31.5*   PLT  118*  123*     Recent Labs      07/03/17   0538  07/02/17   1530  07/01/17   0619   06/30/17   1702   NA  139  139  139   < >   --    K  4.6  4.5  3.4*   < >   --    CL  101  101  97*   < >   --    CO2  32  29  33*   < >   --    BUN  40*  30*  26*   < >   --    CREA  6.90*  5.68*  4.77*   < >   --    GLU  131*  141*  74   < >   --    CA  9.4  9.0  9.4   < >   --    MG   --    --    --    --   1.9   PHOS   --    --    --    --   2.2*    < > = values in this interval not displayed. Recent Labs      06/30/17   2255   PH  7.44   PCO2  48*   PO2  97       Problem List:  Patient Active Problem List    Diagnosis Date Noted    Pulmonary edema 06/30/2017    Orthostatic hypotension     Chronic respiratory failure (HCC)     Chronic diastolic heart failure (HCC)     HTN (hypertension), ESSENTIAL     Hyperlipidemia     Coronary atherosclerosis of native coronary vessel     Hemodialysis access, AV graft ([facility] [address] 09/09/2014    Unable to bear weight 08/28/2014    ESRD on hemodialysis ([facility] [address] 08/28/2014    Weakness of both lower limbs 08/28/2014    Anemia of chronic disease 08/28/2014    History of stroke 08/28/2014    CASSIE (obstructive sleep apnea) 07/16/2014    DM (diabetes mellitus) ([facility] [address] 07/16/2014    Hypothyroidism 06/25/2014    GERD (gastroesophageal reflux disease) 06/19/2014    Lupus ([address] 06/19/2014    Morbid obesity ([facility] [address] 06/19/2014       Issues Addressed By Nephrology:  1. ESRD: HD MWF in [facility]  2. Pulmonary Edema  3.  Anemia with ESRD    Plan:  -HD for UF and clearance  -UF as tolerated  -Reset EDW after today's treatment

## 2017-07-03 NOTE — PROGRESS NOTES
Oxygen Qualifier       Room air: SpO2 with O2 and liter flow   Resting SpO2  88%  93% on 2L   Ambulating SpO2  NA 93% on 2L         Completed by:    Miky Whitt, RT

## 2017-07-03 NOTE — DISCHARGE SUMMARY
Hospitalist Discharge Summary     Patient ID:  Page Forbes  [id number]  81 y.o.  1945  Admit date: 6/30/2017  5:12 PM  Discharge date and time: 7/3/2017  Attending: Tuan Bass MD  PCP:  Marcos Torres MD  Treatment Team: Attending Provider: Tuan Bass MD; Consulting Provider: Timmy Ross MD; Care Manager: Lauren Williamson RN    Principal Diagnosis Pulmonary edema   Principal Problem:    Pulmonary edema (6/30/2017)    Active Problems:    Hypothyroidism (6/25/2014)      CASSIE (obstructive sleep apnea) (7/16/2014)      Overview: BIPAP      DM (diabetes mellitus) ([facility] (7/16/2014)      [facility] hemod[facility] (8/28/2014)      Chronic respiratory failure (HCC) ()      Chronic diastolic heart failure ([facility] ()      HTN (hypertension), ESSENTIAL ()      Coronary atherosclerosis of native coronary vessel ()             Hospital Course:  Please refer to the admission H&P for details of presentation. In summary, the patient is a 82 yo ESRD pt who presented to ER 06/30 with SOB x 1 week. Pt underwent her normal HD run prior to presenting to ER. Chest xray revealed acute mild pulm edema. She underwent an additional HD run Saturday (typically runs MWF). After HD repeat chest x ray shows improvement of pulm edema. Pt noted with 02 sats 89% at rest and 87% during ambulation. She has never required 02 in the past and does not complain of SOB during this event. Echo reveals EF 40-45%, echo 2014 with EF 60%. Pt is stable for d/c home. She will d/c with home 02 @ 2 L NC. She has been started on low dose Coreg for decreased EF, possible new diagnosis systolic CHF. Monitor BP as pt is on Midodrine. She will follow up with [facility] in 1-2 weeks for further work up. She is stable for d/c home. Will cont HD MWF. Significant Diagnostic Studies:     07/01 Echo SUMMARY:    -  Left ventricle: Systolic function was mildly reduced.  Ejection fraction   was  estimated in the range of 40 % to 45 %. There was hypokinesis of the   basal-mid  anteroseptal wall(s). There was mild concentric hypertrophy. -  Left atrium: The atrium was moderately dilated. -  Inferior vena cava, hepatic veins: The inferior vena cava was mildly  dilated. -  Mitral valve: Hyperechoic small mobile structure seen. 07/02 Chest X Ray IMPRESSION: Pulmonary edema improved. Labs: Results:       Chemistry Recent Labs      07/03/17   0538  07/02/17   1530  07/01/17   0619 06/30/17   1712   GLU  131*  141*  74  115*   NA  139  139  139  142   K  4.6  4.5  3.4*  3.3*   CL  101  101  97*  100   CO2  32  29  33*  35*   BUN  40*  30*  26*  18   CREA  6.90*  5.68*  4.77*  3.51*   CA  9.4  9.0  9.4  9.3   AGAP  6*  9  9  7   AP   --    --    --   197*   TP   --    --    --   7.5   ALB   --    --    --   3.0*   GLOB   --    --    --   4.5*   AGRAT   --    --    --   0.7*      CBC w/Diff Recent Labs      07/01/17 0619 06/30/17   1712   WBC  5.8  6.4   RBC  3.02*  3.22*   HGB  9.4*  10.1*   HCT  30.4*  31.5*   PLT  118*  123*   GRANS   --   71   LYMPH   --   19   EOS   --   2      Cardiac Enzymes No results for input(s): CPK, CKND1, NANETTE in the last 72 hours. No lab exists for component: CKRMB, TROIP   Coagulation No results for input(s): PTP, INR, APTT in the last 72 hours. No lab exists for component: INREXT    Lipid Panel Lab Results   Component Value Date/Time    Cholesterol, total 192 08/29/2014 05:28 AM    HDL Cholesterol 30 08/29/2014 05:28 AM    LDL, calculated 103.2 08/29/2014 05:28 AM    VLDL, calculated 58.8 08/29/2014 05:28 AM    Triglyceride 294 08/29/2014 05:28 AM    CHOL/HDL Ratio 6.4 08/29/2014 05:28 AM      BNP No results for input(s): BNPP in the last 72 hours.    Liver Enzymes Recent Labs      06/30/17   1712   TP  7.5   ALB  3.0*   AP  197*   SGOT  13*      Thyroid Studies Lab Results   Component Value Date/Time    TSH 1.170 07/01/2017 06:19 AM            Discharge Exam:  Visit Vitals    /57 (BP 1 Location: Right arm, BP Patient Position: At rest)    Pulse 68    Temp 97.8 °F (36.6 °C)    Resp 18    Ht 5' 2\" (1.575 m)    Wt 94.9 kg (209 lb 4.8 oz)    SpO2 92%    BMI 38.28 kg/m2     General appearance: alert, cooperative, no distress, appears stated age  Lungs: clear to auscultation bilaterally  Heart: regular rate and rhythm, S1, S2 normal, no murmur, click, rub or gallop  Abdomen: soft, non-tender. Bowel sounds normal. No masses,  no organomegaly  Extremities: no cyanosis or edema  Neurologic: Grossly normal    Disposition: Home   Discharge Condition: stable  Patient Instructions:   Current Discharge Medication List      START taking these medications    Details   carvedilol (COREG) 3.125 mg tablet Take 1 Tab by mouth two (2) times daily (with meals). Qty: 60 Tab, Refills: 0         CONTINUE these medications which have CHANGED    Details   insulin glargine (LANTUS) 100 unit/mL injection 30 Units by SubCUTAneous route daily. Qty: 1 Vial, Refills: 0         CONTINUE these medications which have NOT CHANGED    Details   midodrine (PROAMITINE) 2.5 mg tablet Take  by mouth three (3) times daily (with meals). pantoprazole (PROTONIX) 40 mg tablet Take 40 mg by mouth daily. clopidogrel (PLAVIX) 75 mg tablet Take  by mouth daily. Omega-3 Fatty Acids 300 mg cap Take  by mouth nightly. Last dose 6/20/16      allopurinol (ZYLOPRIM) 100 mg tablet Take  by mouth every morning.      gabapentin (NEURONTIN) 100 mg capsule Take 200 mg by mouth three (3) times daily. levothyroxine (SYNTHROID) 175 mcg tablet Take 175 mcg by mouth Daily (before breakfast). fludrocortisone (FLORINEF) 0.1 mg tablet Take  by mouth nightly. acetaminophen (TYLENOL) 325 mg tablet Take 2 Tabs by mouth every four (4) hours as needed for Fever (for fever greater than 100.4 F). Qty: 30 Tab, Refills: 0      sevelamer carbonate (RENVELA) 800 mg tab tab Take 800 mg by mouth three (3) times daily (with meals). cyanocobalamin (VITAMIN B-12) 1,000 mcg tablet Take 1,000 mcg by mouth every morning.            Activity: Regular, as tolerated   Diet: Renal, Diabetic     Follow-up  ·  [facility] in 1-2 weeks for reduced EF   · PCP in 1-2 weeks for hospital follow up   Time spent to discharge patient greater than 30 minutes  Signed:  Kyler Christy NP  7/3/2017  3:16 PM

## 2017-07-03 NOTE — PROGRESS NOTES
Patient is discharging home today. She lives at home with her  who is also ill. Together, the patient and her  care for one another. Patient requires home O2. Home O2 ordered through [address] [place] [address] with a request for an oxygen conservation device evaluation in order to make transport easier for the couple. No other discharge needs noted. Case Management will remain available to assist as needed.     Care Management Interventions  Transition of Care Consult (CM Consult): Discharge Planning  Discharge Durable Medical Equipment: Yes (Home O2 through [facility]  Physical Therapy Consult: No  Occupational Therapy Consult: No  Speech Therapy Consult: No  Current Support Network: Lives with Spouse, Own Home  Confirm Follow Up Transport: Family  Plan discussed with Pt/Family/Caregiver: Yes  Freedom of Choice Offered: Yes  Discharge Location  Discharge Placement: Home with outpatient services

## 2017-07-03 NOTE — DIALYSIS
HD completed without complication. 1.5 kg removed. Needles X 2 removed and pressure dressing applied. Pt alert and VS stable. Pt awaiting transport to return to room.

## 2017-07-03 NOTE — DISCHARGE INSTRUCTIONS
Oxygen Therapy: Care Instructions  Your Care Instructions  Oxygen therapy helps you get more oxygen into your lungs and bloodstream. You may use it if you have a disease that makes it hard to breathe, such as COPD, pulmonary fibrosis (scarring of the lungs), or heart failure. Oxygen therapy can make it easier for you to breathe and can reduce your heart's workload. Some people need extra oxygen all the time. Others need it from time to time throughout the day or overnight. A doctor will prescribe how much oxygen you need and how often to use it. To breathe the oxygen, most people use a nasal cannula (say \"THO-yuh-tia\"). This is a thin tube with two prongs that fit just inside your nose. People who need a lot of oxygen may need to use a mask that fits over the nose and mouth. Follow-up care is a key part of your treatment and safety. Be sure to make and go to all appointments, and call your doctor if you are having problems. It's also a good idea to know your test results and keep a list of the medicines you take. How can you care for yourself at home? To help yourself  · Using oxygen may dry out your nose or lips. Use water-based lubricants on your lips or nostrils. Do not use an oil-based product like petroleum jelly. · If you use a nasal cannula, the tubing may rub under your nostrils and around your ears. To keep your skin from getting sore, tuck some gauze under the tubing. Use a water-based lotion on rubbed areas. · Do not use alcohol or take drugs that relax you, because they will slow your breathing rate. · Keep track of how much oxygen is in the tank, and reorder before it runs out. If a holiday is coming up or you expect bad weather, order in advance or make your regular order larger. · You may need extra oxygen when you travel to high altitudes or travel by plane. Ask your doctor about this.   · If you are getting oxygen directly to your windpipe through an opening in your neck, your doctor will teach you how to care for the equipment. To make sure oxygen is flowing  · Check the flow by holding your mask or cannula up to your ear and listening for the \"hiss\" of airflow. · If you have a nasal cannula, dip the prongs in a glass of water. If you see bubbles, oxygen is coming through. · Check your pressure gauge or contents indicator. · If you use an oxygen concentrator, make sure it is turned on and plugged in. If you use a cylinder, make sure the valve is open. · Look for kinks, blockages, or water in the tubing. Be sure the tubing is connected to the oxygen source. · Do not change your oxygen flow rate. Your doctor sets this at the correct level. Higher flow rates usually do not help and can increase the risk of harmful carbon dioxide buildup in the blood. To be safe  · Do not leave cords or tubing running across an area where you or someone else may trip on it. · Do not let oxygen containers get hot. Store them in a cool place where there is airflow. Do not leave them in a car trunk or a hot vehicle. · Keep oxygen containers upright. Make sure they do not fall over and get damaged. Try securing the tanks in a sturdy container or securing them with a rope or a chain. · Watch for signs of oxygen leaks. If you hear a loud hissing from your container or if it empties too fast, stay away from the container. Open windows right away and call the company that brought the oxygen system to your home. · Do not use oxygen around anything that could spark or easily cause a fire. ¨ Do not smoke or let others smoke while you are using oxygen. Put up \"no smoking\" signs in your home. ¨ Do not use oxygen near open flames, such as candles, fireplaces, gas stoves, or hot water heaters. Do not use it near electric razors, hair dryers, heating pads, or anything that may spark. ¨ Keep a working fire extinguisher in your home where it is easy to get to.   ¨ If a fire starts, turn off the oxygen right away and leave the house. ¨ If you have an oxygen concentrator, do not use it if the cord looks damaged. Do not use an extension cord to plug it in. Do not plug it into an outlet that has other appliances plugged into it. To care for the equipment  · Follow the directions that come with the equipment for using and caring for it. · Wash your cannula or mask with a liquid soap and warm water 1 or 2 times a week. Replace them every 2 to 4 weeks. · If you have a cold, change the nasal prongs when your cold symptoms are done. · If you have an oxygen concentrator, unplug the unit and wipe down the cabinet with a damp cloth daily. Clean the air filter at least 2 times a week. Where can you learn more? Go to http://chapin-dusty.info/. Enter E117 in the search box to learn more about \"Oxygen Therapy: Care Instructions. \"  Current as of: March 25, 2017  Content Version: 11.3  © 5170-1337 [employer]. Care instructions adapted under license by [employer] (which disclaims liability or warranty for this information). If you have questions about a medical condition or this instruction, always ask your healthcare professional. [place] [address] any warranty or liability for your use of this information. Pulmonary Edema: Care Instructions  Your Care Instructions  Pulmonary edema is the buildup of fluid in the lungs. It usually occurs when the heart does not pump blood through the body properly. Pulmonary edema can also be caused by another disease, such as liver or kidney failure. It can also happen at high altitudes, from a poisoning, or as a result of a near-drowning. If you have fluid in your lungs, you may have trouble breathing, be restless, have a fast heart rate, or cough up foamy pink fluid. Breathing problems may be worse when you lie down. Follow-up care is a key part of your treatment and safety.  Be sure to make and go to all appointments, and call your doctor if you are having problems. It's also a good idea to know your test results and keep a list of the medicines you take. How can you care for yourself at home? Medicines  · Take your medicines exactly as prescribed. Call your doctor if you think you are having a problem with your medicine. · Review all of your regular medicines with your doctor. Do not take any vitamins, over-the-counter medicines, or herbal products without talking to your doctor first.  Diet  · Eat a balanced diet. Make an appointment with a dietitian if you have questions about what type of diet might be best for you. · Do not eat more than 2,000 milligrams (mg) of sodium each day. That is less than 1 teaspoon of salt a day, including all the salt you eat in prepared or packaged foods. ¨ Do not add salt while you are cooking or at the table. Flavor with garlic, lemon juice, onion, vinegar, herbs, and spices instead of salt. ¨ Eat fewer processed foods and foods from restaurants, including fast food. ¨ Use fresh or frozen foods instead of canned. ¨ Count and record how much sodium you eat each day. Check food labels for sodium. ¨ Ask your doctor before using salt substitutes that have potassium, such as Lite Salt. Lifestyle  · Stay out of air pollution; smog; cold, dry air; hot, humid air; and high altitudes. · Learn breathing methods that help the airflow in and out of your lungs. · Take rest breaks often. Schedule short rest breaks when doing housework and other activities. An occupational or physical therapist can help you find ways to do everyday activities with less effort. · Start light exercise if your doctor says it is okay. Try to stay as active as possible. If you have not exercised in the past, start out slowly. Walking is a good way to start. · Get enough rest at night. Sleeping with 1 or 2 pillows under your upper body and head may help you breathe easier at night. · Discuss rehabilitation with your doctor.  Find out what programs are available in your area. · Do not smoke or use other tobacco products. Smoking can make your condition worse. If you need help quitting, talk to your doctor about stop-smoking programs and medicines. These can increase your chances of quitting for good. · Do not use alcohol or illegal drugs. When should you call for help? Call 911 anytime you think you may need emergency care. For example, call if:  · You have severe trouble breathing. · You passed out (lost consciousness). · You have symptoms of a heart attack. These may include:  ¨ Chest pain or pressure, or a strange feeling in the chest.  ¨ Sweating. ¨ Shortness of breath. ¨ Nausea or vomiting. ¨ Pain, pressure, or a strange feeling in the back, neck, jaw, or upper belly or in one or both shoulders or arms. ¨ Lightheadedness or sudden weakness. ¨ A fast or irregular heartbeat. Pain that spreads from the chest to the neck, jaw, or one or both shoulders or arms. After you call 911, the  may tell you to chew 1 adult-strength or 2 to 4 low-dose aspirin. Wait for an ambulance. Do not try to drive yourself. Call your doctor now or seek immediate medical care if:  · You have trouble breathing or have wheezing that is getting worse. · You are coughing more deeply or more often. · You cough up blood. · You get a fever. · You have more swelling in your legs or belly. · Your symptoms are getting worse. Watch closely for changes in your health, and be sure to contact your doctor if you have any problems. Where can you learn more? Go to http://chapin-dusty.info/. Enter E036 in the search box to learn more about \"Pulmonary Edema: Care Instructions. \"  Current as of: March 25, 2017  Content Version: 11.3  © 9838-6066 [employer] Care instructions adapted under license by [employer] (which disclaims liability or warranty for this information).  If you have questions about a medical condition or this instruction, always ask your healthcare professional. [address] any warranty or liability for your use of this information. Learning About Heart Failure  What is heart failure? Heart failure means that your heart muscle does not pump as much blood as your body needs. Failure does not mean that your heart has stopped. It means that your heart is not pumping as well as it should. Your body has an amazing ability to make up for heart failure. It may do such a good job that you don't know you have a disease. But at some point, your heart and body will no longer be able to keep up. Then fluid starts to build up in your lungs and other parts of your body. What can you expect when you have heart failure? Heart failure is a lifelong (chronic) disease. Treatment may be able to slow the disease and help you feel better. But heart failure tends to get worse over time. Despite this, there are many steps you can take to feel better and stay healthy longer. Early on, your symptoms may not be too bad. As heart failure gets worse, symptoms typically get worse, and you may need to limit your activities. Heart failure can also get worse suddenly. If this happens, you need emergency care. Then, after treatment, your symptoms may go back to being stable (which means they stay the same) for a long time. Heart failure can lead to other health problems, such as heart rhythm problems. Over time, your treatment options may change, especially as your symptoms get worse. As heart failure gets worse, palliative care can help improve the quality of your life. You can do advance care planning to decide what kind of care you want at the end of your life. What are the symptoms? Symptoms of heart failure start to happen when your heart can't pump enough blood to the rest of your body. In the early stages of heart failure, you may:  · Feel tired easily.   · Be short of breath when you exert yourself. · Feel like your heart is pounding or racing (palpitations). · Feel weak or dizzy. As heart failure gets worse, fluid starts to build up in your lungs and other parts of your body. This may cause you to:  · Feel short of breath even at rest.  · Have swelling (edema), especially in your legs, ankles, and feet. · Gain weight. This may happen over just a day or two, or more slowly. · Cough or wheeze, especially when you lie down. How is heart failure treated? · You'll probably take several medicines. · You might attend cardiac rehabilitation (rehab) to get education and support that help you make lifestyle changes and stay as healthy as possible. · You may get a heart device. A pacemaker helps your heart pump blood. An ICD can stop abnormal heart rhythms. How can you care for yourself? There are many steps you can take to feel better and stay healthy longer. These steps are an important part of treatment. They can help you stay active and enjoy life. · Take your medicine the right way. Avoid medicines that can make your symptoms worse. · Check your weight and symptoms every day. Know what to do if your symptoms get worse. · Limit sodium. This helps keep fluid from building up. It may help you feel better. · Be active. Exercise regularly, but don't exercise too hard. · Be heart-healthy. Eat healthy foods, stay at a healthy weight, limit alcohol, and don't smoke. · Stay as healthy as possible. Avoid colds and flu, get help for depression and anxiety, and manage stress. Follow-up care is a key part of your treatment and safety. Be sure to make and go to all appointments, and call your doctor if you are having problems. It's also a good idea to know your test results and keep a list of the medicines you take. Where can you learn more? Go to http://chapin-dusty.info/. Enter S051 in the search box to learn more about \"Learning About Heart Failure. \"  Current as of: November 15, 2016  Content Version: 11.3  © 0142-5910 [employer]. Care instructions adapted under license by [employer] (which disclaims liability or warranty for this information). If you have questions about a medical condition or this instruction, always ask your healthcare professional. [address] any warranty or liability for your use of this information. DISCHARGE SUMMARY from Nurse    The following personal items are in your possession at time of discharge:    Dental Appliances: None        Home Medications: None  Jewelry: None  Clothing: Shirt, Pants  Other Valuables: None             PATIENT INSTRUCTIONS:    After general anesthesia or intravenous sedation, for 24 hours or while taking prescription Narcotics:  · Limit your activities  · Do not drive and operate hazardous machinery  · Do not make important personal or business decisions  · Do  not drink alcoholic beverages  · If you have not urinated within 8 hours after discharge, please contact your surgeon on call. Report the following to your surgeon:  · Excessive pain, swelling, redness or odor of or around the surgical area  · Temperature over 100.5  · Nausea and vomiting lasting longer than 4 hours or if unable to take medications  · Any signs of decreased circulation or nerve impairment to extremity: change in color, persistent  numbness, tingling, coldness or increase pain  · Any questions        What to do at Home:  Recommended activity: Activity as tolerated,     If you experience any of the following symptoms fever greater then 100.5, increase in shortness of breath, pain unrelieved by medication, please follow up with primary care doctor. *  Please give a list of your current medications to your Primary Care Provider. *  Please update this list whenever your medications are discontinued, doses are      changed, or new medications (including over-the-counter products) are added.     * Please carry medication information at all times in case of emergency situations. These are general instructions for a healthy lifestyle:    No smoking/ No tobacco products/ Avoid exposure to second hand smoke    Surgeon General's Warning:  Quitting smoking now greatly reduces serious risk to your health. Obesity, smoking, and sedentary lifestyle greatly increases your risk for illness    A healthy diet, regular physical exercise & weight monitoring are important for maintaining a healthy lifestyle    You may be retaining fluid if you have a history of heart failure or if you experience any of the following symptoms:  Weight gain of 3 pounds or more overnight or 5 pounds in a week, increased swelling in our hands or feet or shortness of breath while lying flat in bed. Please call your doctor as soon as you notice any of these symptoms; do not wait until your next office visit. Recognize signs and symptoms of STROKE:    F-face looks uneven    A-arms unable to move or move unevenly    S-speech slurred or non-existent    T-time-call 911 as soon as signs and symptoms begin-DO NOT go       Back to bed or wait to see if you get better-TIME IS BRAIN. Warning Signs of HEART ATTACK     Call 911 if you have these symptoms:   Chest discomfort. Most heart attacks involve discomfort in the center of the chest that lasts more than a few minutes, or that goes away and comes back. It can feel like uncomfortable pressure, squeezing, fullness, or pain.  Discomfort in other areas of the upper body. Symptoms can include pain or discomfort in one or both arms, the back, neck, jaw, or stomach.  Shortness of breath with or without chest discomfort.  Other signs may include breaking out in a cold sweat, nausea, or lightheadedness. Don't wait more than five minutes to call 911 - MINUTES MATTER! Fast action can save your life. Calling 911 is almost always the fastest way to get lifesaving treatment.  Emergency Medical Services staff can begin treatment when they arrive -- up to an hour sooner than if someone gets to the hospital by car. The discharge information has been reviewed with the patient. The patient verbalized understanding. Discharge medications reviewed with the patient and appropriate educational materials and side effects teaching were provided.

## 2017-07-04 ENCOUNTER — PATIENT OUTREACH (OUTPATIENT)
Dept: CASE MANAGEMENT | Age: 72
End: 2017-07-04

## 2017-07-04 NOTE — PROGRESS NOTES
Date/Time of Call:       07/04/17 1:40 PM   What was the patient seen in the ED for? Patient was seen in ED for diagnosis of: Pulmonary Edema   Does the patient understand his/her diagnosis and/or treatment and what happened during the ED visit? Spoke with patient who stated understanding of treatment and diagnosis. Did the patient receive discharge instructions from the ED? Patient stated discharge instructions were received from the ED. Review any discharge instructions (see notes in [facility]). Ask patient if they understand these. Do they have any questions? Patient and Care Coordinator reviewed DC instructions. Patient stated understanding and no questions asked. Were home services ordered (nursing, PT, OT, ST, etc.)? HH was not ordered at d/c. If so, has the first visit occurred? If not, why? (Assist with coordination of services if necessary.) N/A   Was any DME ordered? Conserver requested for use with already in use O2 tanks to make it better for patient with travel. If so, has it been received? If not, why?  (Assist with coordination of arranging DME orders if necessary.) Yes patient states conserver has been delivered via [employer].   Complete a review of all medications (new, continued and discontinued meds per the D/C instructions and medication tab in [facility]). Review of medications has been completed by patient and Care Coordinator. Carvedilol prescribed at d/c. Were all new prescriptions filled? If not, why?  (Assist with obtainment of medications if necessary.) No per patient pharmacy was closed today due to the holiday. Patient states she will  med tomorrow. Does the patient understand the purpose and dosing instructions for all medications? (If patient has questions, provide explanation and education.) Patient stated understanding and purpose for instructions for medications. Does the patient have any problems in performing ADLs? (If patient is unable to perform ADLs  what is the limiting factor(s)? Do they have a support system that can assist? If no support system is present, discuss possible assistance that they may be able to obtain.) Patient states she is independent with all ADLs. Does the patient have all follow-up appointments scheduled? Has transportation been arranged? 7 day f/up with PCP?    7-14 day f/up with specialist?    If f/up has not been made  what actions has the care coordinator made to accomplish this? Has transportation been arranged? [facility] Pulmonary follow-up should be within 7 days of discharge; all others should have PCP follow-up within 7 days of discharge; follow-ups with other specialists as appropriate or ordered.) Please note PCP Jj Shankar MD f/u appt. is scheduled for 7/15/17 which is outside of the 7 day f/u window and patient has not yet scheduled f/u with Neuro. Offered patient assistance with moving appt. up and scheduling Cardiology appt. Patient agreed. Patient states she has Dialysis on Mon, Wed, and Fridays. Patient also states she is unable to do either f/u any day this week. Patient stated she would have transportation. All noted and Tjea Graham RN, Manager, and  Adonis Singh RN, Director, both notified of plan. I will call to schedule both appointments for patient on tomorrow 7/5/17 as today is a holiday and offices are closed. Any other questions or concerns expressed by the patient? No other questions asked. No further needs identified. Patient expressed gratitude for care and call. KISHORE Call Completed By: Major Torres LPN   Care Coordinator  [facility]          This note will not be viewable in [address]

## 2017-07-04 NOTE — Clinical Note
Please note patient has PCP appt set for 7/15 and no appt scheduled for Cardiology. I offered to move up PCP appt and schedule Cardiology appt when offices open pt agreed and accepted.   Ty

## 2017-07-05 ENCOUNTER — PATIENT OUTREACH (OUTPATIENT)
Dept: CASE MANAGEMENT | Age: 72
End: 2017-07-05

## 2017-07-05 NOTE — PROGRESS NOTES
Placed call and spoke with  regarding f/u appts. Patient is at dialysis. Gave  all details. PCP f/u is actually scheduled for 7/19/17. Spoke to [facility] who stated Dr. Charlee Barnett was on vacation starting tomorrow, and had no earlier appt dates. [facility] also stated there are no other available providers for hospital f/u appt. For an earlier date either. Placed call to [facility] Cardiology and scheduled f/u appt for 7/20/17 at 11:30 am with an 1115 am arrival.   stated understanding to all and states he will transport patient. Kvng Mercedes RN, manager and Luna Faustin RN, Director both notified of all of the above. F/U scheduled for 7/21/17 to verify appt. Attendance. Matilde Tobin LPN/ Care Coordinator  [address] [place]  / [place], [address] [facility]  [email or web address] note will not be viewable in [address]

## 2017-07-10 ENCOUNTER — PATIENT OUTREACH (OUTPATIENT)
Dept: CASE MANAGEMENT | Age: 72
End: 2017-07-10

## 2017-07-10 NOTE — Clinical Note
Please note this patient states has to be to Dialysis by 11am  and ends around 3pm and can not go anywhere else that day.

## 2017-07-10 NOTE — PROGRESS NOTES
Placed call to patient as advised to ask if she would be able to go to a Hospital f/u appt. On Wednesday morning with Dr. Elayne Gonzalez. Patient states \"I can not go anywhere, dear but Dialysis. I have to be there by 11am and I do not finish until around 3pm.  It is a long day for me\". Directors Ольга Gabriel RN and Joanette Lesches RN notified. Dianna Benson LPN/ Care Coordinator  [address] [place] [address] / [place], [address] [facility]  [email or web address] [facility] note will not be viewable in [address]

## 2017-07-20 PROBLEM — Z99.2 ESRD (END STAGE RENAL DISEASE) ON DIALYSIS (HCC): Status: ACTIVE | Noted: 2017-07-20

## 2017-07-20 PROBLEM — N18.6 ESRD (END STAGE RENAL DISEASE) ON DIALYSIS (HCC): Status: ACTIVE | Noted: 2017-07-20

## 2017-09-22 ENCOUNTER — APPOINTMENT (OUTPATIENT)
Dept: GENERAL RADIOLOGY | Age: 72
End: 2017-09-22
Attending: EMERGENCY MEDICINE
Payer: MEDICARE

## 2017-09-22 ENCOUNTER — HOSPITAL ENCOUNTER (EMERGENCY)
Age: 72
Discharge: HOME | End: 2017-09-22
Attending: EMERGENCY MEDICINE
Payer: MEDICARE

## 2017-09-22 VITALS
HEIGHT: 63 IN | OXYGEN SATURATION: 92 % | HEART RATE: 51 BPM | TEMPERATURE: 97.6 F | BODY MASS INDEX: 37.03 KG/M2 | SYSTOLIC BLOOD PRESSURE: 104 MMHG | RESPIRATION RATE: 14 BRPM | DIASTOLIC BLOOD PRESSURE: 58 MMHG | WEIGHT: 209 LBS

## 2017-09-22 DIAGNOSIS — R00.1 BRADYCARDIA: ICD-10-CM

## 2017-09-22 DIAGNOSIS — M25.512 PAIN IN JOINT OF LEFT SHOULDER: Primary | ICD-10-CM

## 2017-09-22 LAB
ALBUMIN SERPL-MCNC: 3 G/DL (ref 3.2–4.6)
ALBUMIN/GLOB SERPL: 0.7 {RATIO} (ref 1.2–3.5)
ALP SERPL-CCNC: 112 U/L (ref 50–136)
ALT SERPL-CCNC: 18 U/L (ref 12–65)
ANION GAP SERPL CALC-SCNC: 11 MMOL/L (ref 7–16)
AST SERPL-CCNC: 27 U/L (ref 15–37)
BASOPHILS # BLD: 0 K/UL (ref 0–0.2)
BASOPHILS NFR BLD: 1 % (ref 0–2)
BILIRUB SERPL-MCNC: 0.4 MG/DL (ref 0.2–1.1)
BUN SERPL-MCNC: 27 MG/DL (ref 8–23)
CALCIUM SERPL-MCNC: 10.4 MG/DL (ref 8.3–10.4)
CHLORIDE SERPL-SCNC: 98 MMOL/L (ref 98–107)
CO2 SERPL-SCNC: 29 MMOL/L (ref 21–32)
CREAT SERPL-MCNC: 5.42 MG/DL (ref 0.6–1)
DIFFERENTIAL METHOD BLD: ABNORMAL
EOSINOPHIL # BLD: 0.1 K/UL (ref 0–0.8)
EOSINOPHIL NFR BLD: 2 % (ref 0.5–7.8)
ERYTHROCYTE [DISTWIDTH] IN BLOOD BY AUTOMATED COUNT: 16.4 % (ref 11.9–14.6)
GLOBULIN SER CALC-MCNC: 4.4 G/DL (ref 2.3–3.5)
GLUCOSE SERPL-MCNC: 100 MG/DL (ref 65–100)
HCT VFR BLD AUTO: 35.7 % (ref 35.8–46.3)
HGB BLD-MCNC: 11.8 G/DL (ref 11.7–15.4)
IMM GRANULOCYTES # BLD: 0 K/UL (ref 0–0.5)
IMM GRANULOCYTES NFR BLD: 0.6 % (ref 0–5)
LYMPHOCYTES # BLD: 2 K/UL (ref 0.5–4.6)
LYMPHOCYTES NFR BLD: 37 % (ref 13–44)
MCH RBC QN AUTO: 32.7 PG (ref 26.1–32.9)
MCHC RBC AUTO-ENTMCNC: 33.1 G/DL (ref 31.4–35)
MCV RBC AUTO: 98.9 FL (ref 79.6–97.8)
MONOCYTES # BLD: 0.3 K/UL (ref 0.1–1.3)
MONOCYTES NFR BLD: 7 % (ref 4–12)
NEUTS SEG # BLD: 2.8 K/UL (ref 1.7–8.2)
NEUTS SEG NFR BLD: 52 % (ref 43–78)
PLATELET # BLD AUTO: 106 K/UL (ref 150–450)
PMV BLD AUTO: 9.8 FL (ref 10.8–14.1)
POTASSIUM SERPL-SCNC: 3.3 MMOL/L (ref 3.5–5.1)
PROT SERPL-MCNC: 7.4 G/DL (ref 6.3–8.2)
RBC # BLD AUTO: 3.61 M/UL (ref 4.05–5.25)
SODIUM SERPL-SCNC: 138 MMOL/L (ref 136–145)
TROPONIN I BLD-MCNC: 0.03 NG/ML (ref 0.02–0.05)
WBC # BLD AUTO: 5.2 K/UL (ref 4.3–11.1)

## 2017-09-22 PROCEDURE — 99284 EMERGENCY DEPT VISIT MOD MDM: CPT | Performed by: EMERGENCY MEDICINE

## 2017-09-22 PROCEDURE — 74011250637 HC RX REV CODE- 250/637: Performed by: EMERGENCY MEDICINE

## 2017-09-22 PROCEDURE — 93005 ELECTROCARDIOGRAM TRACING: CPT | Performed by: EMERGENCY MEDICINE

## 2017-09-22 PROCEDURE — 80053 COMPREHEN METABOLIC PANEL: CPT | Performed by: EMERGENCY MEDICINE

## 2017-09-22 PROCEDURE — 84484 ASSAY OF TROPONIN QUANT: CPT

## 2017-09-22 PROCEDURE — 71010 XR CHEST PORT: CPT

## 2017-09-22 PROCEDURE — 85025 COMPLETE CBC W/AUTO DIFF WBC: CPT | Performed by: EMERGENCY MEDICINE

## 2017-09-22 PROCEDURE — 73030 X-RAY EXAM OF SHOULDER: CPT

## 2017-09-22 PROCEDURE — 73502 X-RAY EXAM HIP UNI 2-3 VIEWS: CPT

## 2017-09-22 RX ORDER — TRAMADOL HYDROCHLORIDE 50 MG/1
50 TABLET ORAL
Status: COMPLETED | OUTPATIENT
Start: 2017-09-22 | End: 2017-09-22

## 2017-09-22 RX ADMIN — TRAMADOL HYDROCHLORIDE 50 MG: 50 TABLET, FILM COATED ORAL at 19:48

## 2017-09-22 NOTE — ED TRIAGE NOTES
Pt at dialysis began to have chest pain, lethargy and weakness. Dialysis clinic gave aspirin, unsure of the dosage. LBBB with bradycardia, HR in 50's. BP 95/60 for EMS. 800 of fluid per dialysis clinic.  per EMS. Left forearm bleeding after clamp removed. Pressure bandage applied per EMS.

## 2017-09-22 NOTE — ED PROVIDER NOTES
HPI Comments: Patient was at dialysis and had onset of left shoulder pain. She also was noted to have a slow heart rate and blood pressure was low. Transported for evaluation. She states the pain was in the left side back of neck and radiating to the shoulder and upper arm. She also has pain in her left hip. She is on home oxygen prn for CASSIE and CHF. She denies chest pain or SOB at this time. Little nausea at times - none today. No cough or fever. No vomiting. She was started on Coreg in July when she was hospitalized for pulmonary edema. She is also on Midodrine. Family reports generalized weakness. Has started using a wheelchair after she was told she has osteoporosis. Patient is a 81 y.o. female presenting with shoulder pain. The history is provided by the patient, a relative and medical records. Shoulder Pain    The incident occurred 1 to 2 hours ago. Incident location: at dialysis. There was no injury mechanism. The left shoulder is affected. The pain is mild. The pain radiates. There is no history of shoulder injury. There is no history of shoulder surgery. Past Medical History:   Diagnosis Date    Anemia of chronic disease     Arthritis     arthritis OA - generalized multiple joints - pt says \"not really any problems\" (6/21/16)    Chronic diastolic heart failure (HCC)     Chronic kidney disease     HD, dialysis MWF in [facility]    Chronic pain     pt denies this    Chronic respiratory failure (HCC)     Coagulation disorder ([address]     pernicious anemia chronic - tx with procrit     Congestive heart failure, unspecified     Coronary atherosclerosis of native coronary vessel     CVA (cerebral vascular accident) ([address] 6/25/2014    right hand weakness    Diabetes ([address] 1980's    type II; avg fastings- 105, last HA1C 5.8 in May 2016?  Hypo sxs @ 80    Diabetic neuropathy ([address]     DM (diabetes mellitus) ([address] 7/16/2014    End stage renal disease ([address]     ESRD on hemodialysis ([address] 4/2014 multiple temporary access sites    Fibromyalgia     Gout     Heart failure ([address]     Dx 2009 FROM FLUID OVERLOAD FROM KIDNEY DISEASE    Hemodialysis access site with mature fistula ([address] 8/27/14    permanent AV site left forearm placed    Hemodialysis access, AV graft ([address] 9/9/2014 8/27/14 (Dr Sharene Dance) Brachial artery antecubital forearm loop fistula.       Hepatomegaly     not sure why this is here- per pt    History of stroke 8/28/2014    Previous right residual     Hypercholesterolemia     Hypothyroidism approx 2000    controlled with meds     Lupus ([address]     systemic    Morbid obesity ([address] 6/21/16    BMI- 39.2 (verbal)    NSVT (nonsustained ventricular tachycardia) ([address] 6/19/2014    Orthostatic hypotension     CASSIE (obstructive sleep apnea) 6/21/16    BIPAP     Poor historian 6/21/16    probably related to CVA in 2014    Sepsis ([address] 6/19/2014    UNCLEAR ETIOLOGY      Stroke ([address]     Unable to bear weight 8/28/2014    Unspecified sleep apnea 11/13    bi-pap    Vertigo as late effect of stroke 6/21/16    Weakness of both lower limbs 8/28/2014       Past Surgical History:   Procedure Laterality Date    COLONOSCOPY N/A 6/23/2016    COLONOSCOPY  BMI 37 performed by Samantha Medina MD at [address] HX GI      insertion of Peritoneal port RLQ of abdomen     HX GI      removal of peritoneal port     HX HYSTERECTOMY  1975    unknown ovaries    HX LAP CHOLECYSTECTOMY  2009    HX VASCULAR ACCESS  2013 and 4    multiple temporaries    HX VASCULAR ACCESS  8/27/14    permanent placement left forearm    HX VASCULAR ACCESS      Subclavian port in R chest wall (active)     VASCULAR SURGERY PROCEDURE UNLIST  8-27-14    L Brachial artery antecubital forearm loop fistula    VASCULAR SURGERY PROCEDURE UNLIST      L UA AV shunt placement (inactive)          Family History:   Problem Relation Age of Onset    Heart Disease Mother     Diabetes Mother     Lung Disease Mother     Heart Disease Father     Stroke Father     Diabetes Father     Cancer Sister     Diabetes Sister     Heart Disease Sister        Social History     Social History    Marital status:      Spouse name: N/A    Number of children: N/A    Years of education: N/A     Occupational History    Not on file. Social History Main Topics    Smoking status: Never Smoker    Smokeless tobacco: Never Used    Alcohol use No    Drug use: No    Sexual activity: No     Other Topics Concern    Not on file     Social History Narrative    6/2014Married and lives with her . Disabled but previously worked as a . 8/28/14:  UNCHANGED FROM ABOVE. ALLERGIES: Codeine    Review of Systems   Constitutional: Positive for fatigue. HENT: Negative. Respiratory: Negative. Cardiovascular: Negative. Gastrointestinal: Negative. Genitourinary:        ESRD   Musculoskeletal: Positive for arthralgias, gait problem, myalgias and neck pain. Skin: Negative. Neurological: Positive for weakness. Vitals:    09/22/17 1453   BP: 106/57   Pulse: (!) 48   Resp: 16   Temp: 97.5 °F (36.4 °C)   SpO2: 92%   Weight: 94.8 kg (209 lb)   Height: 5' 3\" (1.6 m)            Physical Exam   Constitutional: She is oriented to person, place, and time. obese   HENT:   Head: Normocephalic and atraumatic. Mouth/Throat: Oropharynx is clear and moist.   Neck: No JVD present. Cardiovascular: Regular rhythm, normal heart sounds and intact distal pulses. bradycardia   Pulmonary/Chest: Effort normal and breath sounds normal.   Abdominal: Soft. Bowel sounds are normal. There is no tenderness. Musculoskeletal: She exhibits no edema. Pain with ROM of left shoulder and left hip. No deformity, ecchymosis, redness or increased warmth. Neurological: She is alert and oriented to person, place, and time. Skin: Skin is warm and dry. Nursing note and vitals reviewed.        MDM  ED Course Procedures    Left shoulder pain  Left hip pain  Sinus bradycardia - rate in 40's to low 50's. EKG sinus bradycardia, LBBB  CXR cardiomegaly  Hip and shoulder xray - no acute processes  Was started on Coreg in July. Discussed discontinuing with Dr. Tiffanie Hicks. OK to DC for her bradycardia and low BP today.   Tramadol 50 mg po for pain  Results and instructions discussed with patient and family  Follow up with PCP and nephrology

## 2017-09-23 LAB
ATRIAL RATE: 51 BPM
CALCULATED R AXIS, ECG10: -2 DEGREES
CALCULATED T AXIS, ECG11: -174 DEGREES
DIAGNOSIS, 93000: NORMAL
Q-T INTERVAL, ECG07: 544 MS
QRS DURATION, ECG06: 196 MS
QTC CALCULATION (BEZET), ECG08: 491 MS
VENTRICULAR RATE, ECG03: 49 BPM

## 2017-10-18 PROBLEM — I50.22 SYSTOLIC CHF, CHRONIC (HCC): Status: ACTIVE | Noted: 2017-10-18

## 2017-10-20 ENCOUNTER — HOME HEALTH ADMISSION (OUTPATIENT)
Dept: HOME HEALTH SERVICES | Facility: HOME HEALTH | Age: 72
End: 2017-10-20

## 2017-12-06 ENCOUNTER — APPOINTMENT (OUTPATIENT)
Dept: CT IMAGING | Age: 72
DRG: 175 | End: 2017-12-06
Payer: MEDICARE

## 2017-12-06 ENCOUNTER — APPOINTMENT (OUTPATIENT)
Dept: ULTRASOUND IMAGING | Age: 72
DRG: 175 | End: 2017-12-06
Attending: INTERNAL MEDICINE
Payer: MEDICARE

## 2017-12-06 ENCOUNTER — HOSPITAL ENCOUNTER (INPATIENT)
Age: 72
LOS: 13 days | Discharge: NURSING, REHAB OR LONG-TERM CARE | DRG: 175 | End: 2017-12-19
Admitting: INTERNAL MEDICINE
Payer: MEDICARE

## 2017-12-06 ENCOUNTER — APPOINTMENT (OUTPATIENT)
Dept: GENERAL RADIOLOGY | Age: 72
DRG: 175 | End: 2017-12-06
Payer: MEDICARE

## 2017-12-06 DIAGNOSIS — S81.801D WOUND OF RIGHT LOWER EXTREMITY, SUBSEQUENT ENCOUNTER: ICD-10-CM

## 2017-12-06 DIAGNOSIS — R06.03 RESPIRATORY DISTRESS: Primary | ICD-10-CM

## 2017-12-06 DIAGNOSIS — G47.33 OSA (OBSTRUCTIVE SLEEP APNEA): Chronic | ICD-10-CM

## 2017-12-06 DIAGNOSIS — Z99.2 TYPE 2 DIABETES MELLITUS WITH CHRONIC KIDNEY DISEASE ON CHRONIC DIALYSIS, UNSPECIFIED LONG TERM INSULIN USE STATUS: Chronic | ICD-10-CM

## 2017-12-06 DIAGNOSIS — Z99.2 ESRD ON HEMODIALYSIS (HCC): Chronic | ICD-10-CM

## 2017-12-06 DIAGNOSIS — J96.01 ACUTE RESPIRATORY FAILURE WITH HYPOXIA (HCC): ICD-10-CM

## 2017-12-06 DIAGNOSIS — N18.6 ESRD ON HEMODIALYSIS (HCC): Chronic | ICD-10-CM

## 2017-12-06 DIAGNOSIS — N18.6 TYPE 2 DIABETES MELLITUS WITH CHRONIC KIDNEY DISEASE ON CHRONIC DIALYSIS, UNSPECIFIED LONG TERM INSULIN USE STATUS: Chronic | ICD-10-CM

## 2017-12-06 DIAGNOSIS — I26.99 OTHER ACUTE PULMONARY EMBOLISM WITHOUT ACUTE COR PULMONALE (HCC): ICD-10-CM

## 2017-12-06 DIAGNOSIS — I95.1 ORTHOSTATIC HYPOTENSION: ICD-10-CM

## 2017-12-06 DIAGNOSIS — E11.22 TYPE 2 DIABETES MELLITUS WITH CHRONIC KIDNEY DISEASE ON CHRONIC DIALYSIS, UNSPECIFIED LONG TERM INSULIN USE STATUS: Chronic | ICD-10-CM

## 2017-12-06 DIAGNOSIS — I26.09 OTHER ACUTE PULMONARY EMBOLISM WITH ACUTE COR PULMONALE (HCC): ICD-10-CM

## 2017-12-06 DIAGNOSIS — L93.0 LUPUS ERYTHEMATOSUS, UNSPECIFIED FORM: Chronic | ICD-10-CM

## 2017-12-06 DIAGNOSIS — I50.22 SYSTOLIC CHF, CHRONIC (HCC): Chronic | ICD-10-CM

## 2017-12-06 DIAGNOSIS — S81.801S WOUND OF RIGHT LOWER EXTREMITY, SEQUELA: ICD-10-CM

## 2017-12-06 DIAGNOSIS — E66.01 MORBID OBESITY (HCC): Chronic | ICD-10-CM

## 2017-12-06 DIAGNOSIS — I50.32 CHRONIC DIASTOLIC HEART FAILURE (HCC): ICD-10-CM

## 2017-12-06 PROBLEM — J96.00 ACUTE RESPIRATORY FAILURE (HCC): Status: ACTIVE | Noted: 2017-12-06

## 2017-12-06 LAB
ALBUMIN SERPL-MCNC: 3.3 G/DL (ref 3.2–4.6)
ALBUMIN/GLOB SERPL: 0.8 {RATIO} (ref 1.2–3.5)
ALP SERPL-CCNC: 156 U/L (ref 50–136)
ALT SERPL-CCNC: 34 U/L (ref 12–65)
ANION GAP SERPL CALC-SCNC: 14 MMOL/L (ref 7–16)
APTT PPP: 188.7 SEC (ref 23.2–35.3)
APTT PPP: 26.9 SEC (ref 23.2–35.3)
APTT PPP: 86.1 SEC (ref 23.2–35.3)
ARTERIAL PATENCY WRIST A: ABNORMAL
ARTERIAL PATENCY WRIST A: POSITIVE
ARTERIAL PATENCY WRIST A: POSITIVE
AST SERPL-CCNC: 39 U/L (ref 15–37)
ATRIAL RATE: 89 BPM
BASE EXCESS BLDA CALC-SCNC: 1.2 MMOL/L (ref 0–3)
BASE EXCESS BLDA CALC-SCNC: 1.6 MMOL/L (ref 0–3)
BASE EXCESS BLDA CALC-SCNC: 2.3 MMOL/L (ref 0–3)
BASOPHILS # BLD: 0 K/UL (ref 0–0.2)
BASOPHILS NFR BLD: 0 % (ref 0–2)
BDY SITE: ABNORMAL
BILIRUB SERPL-MCNC: 1.1 MG/DL (ref 0.2–1.1)
BNP SERPL-MCNC: 1281 PG/ML
BUN SERPL-MCNC: 64 MG/DL (ref 8–23)
CA-I BLD-SCNC: 1.2 MMOL/L (ref 1–1.3)
CA-I BLD-SCNC: 1.22 MMOL/L (ref 1–1.3)
CALCIUM SERPL-MCNC: 10 MG/DL (ref 8.3–10.4)
CALCULATED R AXIS, ECG10: 76 DEGREES
CALCULATED T AXIS, ECG11: -134 DEGREES
CHLORIDE BLDA-SCNC: 95 MMOL/L (ref 98–106)
CHLORIDE BLDA-SCNC: 95 MMOL/L (ref 98–106)
CHLORIDE SERPL-SCNC: 96 MMOL/L (ref 98–107)
CK SERPL-CCNC: 76 U/L (ref 21–215)
CO2 SERPL-SCNC: 27 MMOL/L (ref 21–32)
COHGB MFR BLD: 0.3 % (ref 0.5–1.5)
COHGB MFR BLD: 0.3 % (ref 0.5–1.5)
COHGB MFR BLD: 1.2 % (ref 0.5–1.5)
CREAT SERPL-MCNC: 7.45 MG/DL (ref 0.6–1)
D DIMER PPP FEU-MCNC: 8 UG/ML(FEU)
DIAGNOSIS, 93000: NORMAL
DIFFERENTIAL METHOD BLD: ABNORMAL
DO-HGB BLD-MCNC: 14 % (ref 0–5)
DO-HGB BLD-MCNC: 17 % (ref 0–5)
DO-HGB BLD-MCNC: 7 % (ref 0–5)
EOSINOPHIL # BLD: 0.1 K/UL (ref 0–0.8)
EOSINOPHIL NFR BLD: 1 % (ref 0.5–7.8)
ERYTHROCYTE [DISTWIDTH] IN BLOOD BY AUTOMATED COUNT: 17.7 % (ref 11.9–14.6)
FIO2 ON VENT: 100 %
FIO2 ON VENT: 100 %
FIO2 ON VENT: 75 %
GAS FLOW.O2 O2 DELIVERY SYS: 15 L/MIN
GLOBULIN SER CALC-MCNC: 4 G/DL (ref 2.3–3.5)
GLUCOSE BLD STRIP.AUTO-MCNC: 122 MG/DL (ref 65–100)
GLUCOSE BLD STRIP.AUTO-MCNC: 133 MG/DL (ref 65–100)
GLUCOSE BLD STRIP.AUTO-MCNC: 135 MG/DL (ref 65–100)
GLUCOSE SERPL-MCNC: 219 MG/DL (ref 65–100)
HCO3 BLDA-SCNC: 27 MMOL/L (ref 22–26)
HCO3 BLDA-SCNC: 28 MMOL/L (ref 22–26)
HCO3 BLDA-SCNC: 28 MMOL/L (ref 22–26)
HCT VFR BLD AUTO: 28.6 % (ref 35.8–46.3)
HGB BLD-MCNC: 9.1 G/DL (ref 11.7–15.4)
HGB BLDMV-MCNC: 9.4 GM/DL (ref 11.7–15)
HGB BLDMV-MCNC: 9.8 GM/DL (ref 11.7–15)
HGB BLDMV-MCNC: 9.9 GM/DL (ref 11.7–15)
IMM GRANULOCYTES # BLD: 0.1 K/UL (ref 0–0.5)
IMM GRANULOCYTES NFR BLD AUTO: 2 % (ref 0–5)
INR PPP: 1
LACTATE BLD-SCNC: 3 MMOL/L (ref 0.5–1.9)
LYMPHOCYTES # BLD: 1.1 K/UL (ref 0.5–4.6)
LYMPHOCYTES NFR BLD: 21 % (ref 13–44)
MCH RBC QN AUTO: 34.2 PG (ref 26.1–32.9)
MCHC RBC AUTO-ENTMCNC: 31.8 G/DL (ref 31.4–35)
MCV RBC AUTO: 107.5 FL (ref 79.6–97.8)
METHGB MFR BLD: 0.1 % (ref 0–1.5)
METHGB MFR BLD: 0.1 % (ref 0–1.5)
METHGB MFR BLD: 0.3 % (ref 0–1.5)
MONOCYTES # BLD: 0.5 K/UL (ref 0.1–1.3)
MONOCYTES NFR BLD: 10 % (ref 4–12)
NEUTS SEG # BLD: 3.5 K/UL (ref 1.7–8.2)
NEUTS SEG NFR BLD: 66 % (ref 43–78)
OXYHGB MFR BLDA: 82.8 % (ref 94–97)
OXYHGB MFR BLDA: 85.7 % (ref 94–97)
OXYHGB MFR BLDA: 91.1 % (ref 94–97)
PCO2 BLDA: 48 MMHG (ref 35–45)
PCO2 BLDA: 52 MMHG (ref 35–45)
PCO2 BLDA: 53 MMHG (ref 35–45)
PEEP RESPIRATORY: 10 CM[H2O]
PH BLDA: 7.34 [PH] (ref 7.35–7.45)
PH BLDA: 7.35 [PH] (ref 7.35–7.45)
PH BLDA: 7.37 [PH] (ref 7.35–7.45)
PLATELET # BLD AUTO: 144 K/UL (ref 150–450)
PMV BLD AUTO: 9.6 FL (ref 10.8–14.1)
PO2 BLDA: 51 MMHG (ref 80–105)
PO2 BLDA: 58 MMHG (ref 80–105)
PO2 BLDA: 67 MMHG (ref 80–105)
POTASSIUM BLDA-SCNC: 3.84 MMOL/L (ref 3.5–5.3)
POTASSIUM BLDA-SCNC: 4.05 MMOL/L (ref 3.5–5.3)
POTASSIUM SERPL-SCNC: 3.9 MMOL/L (ref 3.5–5.1)
PROCALCITONIN SERPL-MCNC: 0.4 NG/ML
PROT SERPL-MCNC: 7.3 G/DL (ref 6.3–8.2)
PROTHROMBIN TIME: 12.8 SEC (ref 11.5–14.5)
Q-T INTERVAL, ECG07: 416 MS
QRS DURATION, ECG06: 148 MS
QTC CALCULATION (BEZET), ECG08: 495 MS
RBC # BLD AUTO: 2.66 M/UL (ref 4.05–5.25)
RESP RATE: 15
SAO2 % BLD: 83 % (ref 92–98.5)
SAO2 % BLD: 86 % (ref 92–98.5)
SAO2 % BLD: 93 % (ref 92–98.5)
SERVICE CMNT-IMP: ABNORMAL
SODIUM BLDA-SCNC: 132.5 MMOL/L (ref 135–148)
SODIUM BLDA-SCNC: 133.7 MMOL/L (ref 135–148)
SODIUM SERPL-SCNC: 137 MMOL/L (ref 136–145)
TROPONIN I SERPL-MCNC: 0.33 NG/ML (ref 0.02–0.05)
VENTILATION MODE VENT: ABNORMAL
VENTRICULAR RATE, ECG03: 85 BPM
WBC # BLD AUTO: 5.3 K/UL (ref 4.3–11.1)

## 2017-12-06 PROCEDURE — 77030012793 HC CIRC VNTLTR FISP -B

## 2017-12-06 PROCEDURE — 85730 THROMBOPLASTIN TIME PARTIAL: CPT | Performed by: INTERNAL MEDICINE

## 2017-12-06 PROCEDURE — 80051 ELECTROLYTE PANEL: CPT

## 2017-12-06 PROCEDURE — 74011250636 HC RX REV CODE- 250/636: Performed by: INTERNAL MEDICINE

## 2017-12-06 PROCEDURE — C8929 TTE W OR WO FOL WCON,DOPPLER: HCPCS

## 2017-12-06 PROCEDURE — 82962 GLUCOSE BLOOD TEST: CPT

## 2017-12-06 PROCEDURE — 85025 COMPLETE CBC W/AUTO DIFF WBC: CPT

## 2017-12-06 PROCEDURE — 74011000250 HC RX REV CODE- 250: Performed by: INTERNAL MEDICINE

## 2017-12-06 PROCEDURE — 84484 ASSAY OF TROPONIN QUANT: CPT | Performed by: INTERNAL MEDICINE

## 2017-12-06 PROCEDURE — 36415 COLL VENOUS BLD VENIPUNCTURE: CPT | Performed by: INTERNAL MEDICINE

## 2017-12-06 PROCEDURE — 90935 HEMODIALYSIS ONE EVALUATION: CPT

## 2017-12-06 PROCEDURE — 71010 XR CHEST PORT: CPT

## 2017-12-06 PROCEDURE — 65620000000 HC RM CCU GENERAL

## 2017-12-06 PROCEDURE — 87040 BLOOD CULTURE FOR BACTERIA: CPT

## 2017-12-06 PROCEDURE — 74011250637 HC RX REV CODE- 250/637: Performed by: INTERNAL MEDICINE

## 2017-12-06 PROCEDURE — 82803 BLOOD GASES ANY COMBINATION: CPT

## 2017-12-06 PROCEDURE — 36600 WITHDRAWAL OF ARTERIAL BLOOD: CPT

## 2017-12-06 PROCEDURE — 83605 ASSAY OF LACTIC ACID: CPT

## 2017-12-06 PROCEDURE — 71260 CT THORAX DX C+: CPT

## 2017-12-06 PROCEDURE — 84145 PROCALCITONIN (PCT): CPT

## 2017-12-06 PROCEDURE — 94660 CPAP INITIATION&MGMT: CPT

## 2017-12-06 PROCEDURE — 99285 EMERGENCY DEPT VISIT HI MDM: CPT

## 2017-12-06 PROCEDURE — 74011636320 HC RX REV CODE- 636/320

## 2017-12-06 PROCEDURE — 74011000258 HC RX REV CODE- 258

## 2017-12-06 PROCEDURE — 93970 EXTREMITY STUDY: CPT

## 2017-12-06 PROCEDURE — 82550 ASSAY OF CK (CPK): CPT | Performed by: INTERNAL MEDICINE

## 2017-12-06 PROCEDURE — 80053 COMPREHEN METABOLIC PANEL: CPT

## 2017-12-06 PROCEDURE — 83880 ASSAY OF NATRIURETIC PEPTIDE: CPT | Performed by: INTERNAL MEDICINE

## 2017-12-06 PROCEDURE — 85610 PROTHROMBIN TIME: CPT | Performed by: INTERNAL MEDICINE

## 2017-12-06 PROCEDURE — 93005 ELECTROCARDIOGRAM TRACING: CPT | Performed by: INTERNAL MEDICINE

## 2017-12-06 PROCEDURE — 77030013032 HC MSK BPAP/CPAP FISP -B

## 2017-12-06 PROCEDURE — 85379 FIBRIN DEGRADATION QUANT: CPT

## 2017-12-06 RX ORDER — HEPARIN SODIUM 5000 [USP'U]/100ML
18-36 INJECTION, SOLUTION INTRAVENOUS
Status: DISCONTINUED | OUTPATIENT
Start: 2017-12-06 | End: 2017-12-07 | Stop reason: ALTCHOICE

## 2017-12-06 RX ORDER — PANTOPRAZOLE SODIUM 40 MG/1
40 TABLET, DELAYED RELEASE ORAL DAILY
Status: DISCONTINUED | OUTPATIENT
Start: 2017-12-06 | End: 2017-12-19 | Stop reason: HOSPADM

## 2017-12-06 RX ORDER — FLUDROCORTISONE ACETATE 0.1 MG/1
100 TABLET ORAL
Status: DISCONTINUED | OUTPATIENT
Start: 2017-12-06 | End: 2017-12-19 | Stop reason: HOSPADM

## 2017-12-06 RX ORDER — SODIUM CHLORIDE 0.9 % (FLUSH) 0.9 %
5-10 SYRINGE (ML) INJECTION EVERY 8 HOURS
Status: DISCONTINUED | OUTPATIENT
Start: 2017-12-06 | End: 2017-12-19 | Stop reason: HOSPADM

## 2017-12-06 RX ORDER — ACETAMINOPHEN 325 MG/1
650 TABLET ORAL
Status: DISCONTINUED | OUTPATIENT
Start: 2017-12-06 | End: 2017-12-19 | Stop reason: HOSPADM

## 2017-12-06 RX ORDER — GUAIFENESIN 600 MG/1
1200 TABLET, EXTENDED RELEASE ORAL 2 TIMES DAILY
COMMUNITY
End: 2018-02-24 | Stop reason: CLARIF

## 2017-12-06 RX ORDER — MIDODRINE HYDROCHLORIDE 5 MG/1
5 TABLET ORAL
Status: DISCONTINUED | OUTPATIENT
Start: 2017-12-07 | End: 2017-12-19 | Stop reason: HOSPADM

## 2017-12-06 RX ORDER — MIDODRINE HYDROCHLORIDE 10 MG/1
10 TABLET ORAL 3 TIMES DAILY
Status: ON HOLD | COMMUNITY
End: 2017-12-19

## 2017-12-06 RX ORDER — MIDODRINE HYDROCHLORIDE 5 MG/1
2.5 TABLET ORAL
Status: DISCONTINUED | OUTPATIENT
Start: 2017-12-06 | End: 2017-12-06

## 2017-12-06 RX ORDER — POLYETHYLENE GLYCOL 3350 17 G/17G
17 POWDER, FOR SOLUTION ORAL
COMMUNITY
End: 2020-01-01

## 2017-12-06 RX ORDER — INSULIN GLARGINE 100 [IU]/ML
5 INJECTION, SOLUTION SUBCUTANEOUS
COMMUNITY
End: 2020-01-01 | Stop reason: SDUPTHER

## 2017-12-06 RX ORDER — CLOPIDOGREL BISULFATE 75 MG/1
75 TABLET ORAL DAILY
Status: DISCONTINUED | OUTPATIENT
Start: 2017-12-06 | End: 2017-12-07

## 2017-12-06 RX ORDER — HEPARIN SODIUM 5000 [USP'U]/ML
80 INJECTION, SOLUTION INTRAVENOUS; SUBCUTANEOUS ONCE
Status: COMPLETED | OUTPATIENT
Start: 2017-12-06 | End: 2017-12-06

## 2017-12-06 RX ORDER — LEVOTHYROXINE SODIUM 200 UG/1
200 TABLET ORAL
COMMUNITY

## 2017-12-06 RX ORDER — SODIUM CHLORIDE 0.9 % (FLUSH) 0.9 %
10 SYRINGE (ML) INJECTION
Status: COMPLETED | OUTPATIENT
Start: 2017-12-06 | End: 2017-12-06

## 2017-12-06 RX ORDER — SODIUM CHLORIDE 0.9 % (FLUSH) 0.9 %
5-10 SYRINGE (ML) INJECTION AS NEEDED
Status: DISCONTINUED | OUTPATIENT
Start: 2017-12-06 | End: 2017-12-19 | Stop reason: HOSPADM

## 2017-12-06 RX ORDER — INSULIN GLARGINE 100 [IU]/ML
30 INJECTION, SOLUTION SUBCUTANEOUS DAILY
Status: DISCONTINUED | OUTPATIENT
Start: 2017-12-06 | End: 2017-12-06 | Stop reason: ALTCHOICE

## 2017-12-06 RX ADMIN — Medication 10 ML: at 07:30

## 2017-12-06 RX ADMIN — Medication 10 ML: at 05:47

## 2017-12-06 RX ADMIN — PANTOPRAZOLE SODIUM 40 MG: 40 TABLET, DELAYED RELEASE ORAL at 08:49

## 2017-12-06 RX ADMIN — IOPAMIDOL 100 ML: 755 INJECTION, SOLUTION INTRAVENOUS at 05:47

## 2017-12-06 RX ADMIN — Medication 10 ML: at 22:02

## 2017-12-06 RX ADMIN — Medication: at 22:02

## 2017-12-06 RX ADMIN — Medication 10 ML: at 14:16

## 2017-12-06 RX ADMIN — HEPARIN SODIUM AND DEXTROSE 18 UNITS/KG/HR: 5000; 5 INJECTION INTRAVENOUS at 07:17

## 2017-12-06 RX ADMIN — MIDODRINE HYDROCHLORIDE 2.5 MG: 5 TABLET ORAL at 08:49

## 2017-12-06 RX ADMIN — CLOPIDOGREL BISULFATE 75 MG: 75 TABLET ORAL at 08:49

## 2017-12-06 RX ADMIN — ACETAMINOPHEN 650 MG: 325 TABLET ORAL at 16:18

## 2017-12-06 RX ADMIN — MIDODRINE HYDROCHLORIDE 2.5 MG: 5 TABLET ORAL at 16:19

## 2017-12-06 RX ADMIN — SODIUM CHLORIDE 100 ML: 900 INJECTION, SOLUTION INTRAVENOUS at 05:47

## 2017-12-06 RX ADMIN — FLUDROCORTISONE ACETATE 100 MCG: 0.1 TABLET ORAL at 22:02

## 2017-12-06 RX ADMIN — MIDODRINE HYDROCHLORIDE 2.5 MG: 5 TABLET ORAL at 12:43

## 2017-12-06 RX ADMIN — LEVOTHYROXINE SODIUM 175 MCG: 50 TABLET ORAL at 08:49

## 2017-12-06 RX ADMIN — PERFLUTREN 1 ML: 6.52 INJECTION, SUSPENSION INTRAVENOUS at 12:00

## 2017-12-06 RX ADMIN — HEPARIN SODIUM 7500 UNITS: 5000 INJECTION, SOLUTION INTRAVENOUS; SUBCUTANEOUS at 07:16

## 2017-12-06 NOTE — INTERDISCIPLINARY ROUNDS
Interdisciplinary team rounds were held 12/6/2017 with the following team members:Care Management, Nursing, Nurse Practitioner, Nutrition, Palliative Care, Pharmacy, Physical Therapy, Physician, Respiratory Therapy and Clinical Coordinator and the patient. Plan of care discussed. See clinical pathway and/or care plan for interventions and desired outcomes.

## 2017-12-06 NOTE — ED PROVIDER NOTES
HPI Comments: 11year-old female brought from nursing home by EMS for nausea vomiting shortness of breath hypoxia and hypotension. Patient is a renal dialysis patient Monday Wednesday and Fridays has a history congestive heart failure and chronic renal failure. Patient is a 81 y.o. female presenting with general illness. The history is provided by the nursing home. Generalized Body Aches   This is a recurrent problem. The current episode started 12 to 24 hours ago. The problem occurs constantly. The problem has been gradually improving. Associated symptoms include shortness of breath. Pertinent negatives include no chest pain, no abdominal pain and no headaches. Nothing relieves the symptoms. Treatments tried: nasal cannula O2. The treatment provided no relief. Past Medical History:   Diagnosis Date    Anemia of chronic disease     Arthritis     arthritis OA - generalized multiple joints - pt says \"not really any problems\" (6/21/16)    Chronic diastolic heart failure (HCC)     Chronic kidney disease     HD, dialysis MWF in [facility]    Chronic pain     pt denies this    Chronic respiratory failure (HCC)     Coagulation disorder ([address]     pernicious anemia chronic - tx with procrit     Congestive heart failure, unspecified     Coronary atherosclerosis of native coronary vessel     CVA (cerebral vascular accident) ([address] 6/25/2014    right hand weakness    Diabetes ([address] 1980's    type II; avg fastings- 105, last HA1C 5.8 in May 2016?  Hypo sxs @ [de-identified]    Diabetic neuropathy (HCC)     DM (diabetes mellitus) ([address] 7/16/2014    End stage renal disease ([address]     ESRD on hemodialysis ([address] 4/2014    multiple temporary access sites    Fibromyalgia     Gout     Heart failure ([address]     Dx 2009 FROM FLUID OVERLOAD FROM KIDNEY DISEASE    Hemodialysis access site with mature fistula ([address] 8/27/14    permanent AV site left forearm placed    Hemodialysis access, AV graft ([address] 9/9/2014 8/27/14 ( Louis Escalona) Brachial artery antecubital forearm loop fistula.  Hepatomegaly     not sure why this is here- per pt    History of stroke 8/28/2014    Previous right residual     Hypercholesterolemia     Hypothyroidism approx 2000    controlled with meds     Lupus     systemic    Morbid obesity ([address] 6/21/16    BMI- 39.2 (verbal)    NSVT (nonsustained ventricular tachycardia) ([address] 6/19/2014    Orthostatic hypotension     CASSIE (obstructive sleep apnea) 6/21/16    BIPAP     Poor historian 6/21/16    probably related to CVA in 2014    Sepsis ([address] 6/19/2014    UNCLEAR ETIOLOGY      Stroke ([address]     Unable to bear weight 8/28/2014    Unspecified sleep apnea 11/13    bi-pap    Vertigo as late effect of stroke 6/21/16    Weakness of both lower limbs 8/28/2014       Past Surgical History:   Procedure Laterality Date    COLONOSCOPY N/A 6/23/2016    COLONOSCOPY  BMI 37 performed by Marley Diaz MD at [facility] ENDOSCOPY    HX GI      insertion of Peritoneal port RLQ of abdomen     HX GI      removal of peritoneal port     HX HYSTERECTOMY  1975    unknown ovaries    HX LAP CHOLECYSTECTOMY  2009    HX VASCULAR ACCESS  2013 and 4    multiple temporaries    HX VASCULAR ACCESS  8/27/14    permanent placement left forearm    HX VASCULAR ACCESS      Subclavian port in R chest wall (active)     VASCULAR SURGERY PROCEDURE UNLIST  8-27-14    L Brachial artery antecubital forearm loop fistula    VASCULAR SURGERY PROCEDURE UNLIST      L UA AV shunt placement (inactive)          Family History:   Problem Relation Age of Onset    Heart Disease Mother     Diabetes Mother     Lung Disease Mother     Heart Disease Father     Stroke Father     Diabetes Father     Cancer Sister     Diabetes Sister     Heart Disease Sister        Social History     Social History    Marital status:      Spouse name: N/A    Number of children: N/A    Years of education: N/A     Occupational History    Not on file. Social History Main Topics    Smoking status: Never Smoker    Smokeless tobacco: Never Used    Alcohol use No    Drug use: No    Sexual activity: No     Other Topics Concern    Not on file     Social History Narrative    6/2014Married and lives with her . Disabled but previously worked as a . 8/28/14:  UNCHANGED FROM ABOVE. ALLERGIES: Codeine    Review of Systems   Constitutional: Negative. Negative for activity change. HENT: Negative. Eyes: Negative. Respiratory: Positive for shortness of breath. Cardiovascular: Negative. Negative for chest pain. Gastrointestinal: Negative. Negative for abdominal pain. Genitourinary: Negative. Musculoskeletal: Negative. Skin: Negative. Neurological: Negative. Negative for headaches. Psychiatric/Behavioral: Negative. All other systems reviewed and are negative. Vitals:    12/06/17 0116 12/06/17 0125   BP: 108/59    Pulse: 88    Resp: 14    Temp: 98.4 °F (36.9 °C)    SpO2: (!) 79% (!) 81%   Weight: 93.9 kg (207 lb)    Height: 5' 3\" (1.6 m)             Physical Exam   Constitutional: She is oriented to person, place, and time. She appears well-developed. She appears lethargic. She appears toxic. She has a sickly appearance. She appears ill. She appears distressed. HENT:   Head: Normocephalic and atraumatic. Right Ear: External ear normal.   Left Ear: External ear normal.   Nose: Nose normal.   Mouth/Throat: Oropharynx is clear and moist. No oropharyngeal exudate. Eyes: Conjunctivae and EOM are normal. Pupils are equal, round, and reactive to light. Right eye exhibits no discharge. Left eye exhibits no discharge. No scleral icterus. Neck: Normal range of motion. Neck supple. No JVD present. No tracheal deviation present. Cardiovascular: Normal rate, regular rhythm and intact distal pulses. Pulmonary/Chest: No stridor. She is in respiratory distress. She has wheezes. Abdominal: Soft.  Bowel sounds are normal. She exhibits no distension and no mass. There is no tenderness. Musculoskeletal: Normal range of motion. She exhibits no edema or tenderness. Neurological: She is oriented to person, place, and time. She appears lethargic. No cranial nerve deficit. Skin: Skin is warm and dry. No rash noted. She is not diaphoretic. No erythema. There is pallor. Psychiatric: She has a normal mood and affect. Her behavior is normal. Thought content normal.   Nursing note and vitals reviewed. MDM  Number of Diagnoses or Management Options  Other acute pulmonary embolism without acute cor pulmonale ([address]): new and requires workup  Respiratory distress: new and requires workup  Diagnosis management comments: Patient required BiPAP to keep SaO2 greater than 90. Patient medical power of  in room does not want her intubated but all other efforts can be made. patient did improve after BiPAP quite some time however she still had some respiratory distress CT revealed bilateral PEs heparin was started and patient was admitted to pulmonary       Amount and/or Complexity of Data Reviewed  Clinical lab tests: ordered and reviewed  Tests in the radiology section of CPT®: ordered and reviewed  Tests in the medicine section of CPT®: ordered and reviewed    Risk of Complications, Morbidity, and/or Mortality  Presenting problems: high  Diagnostic procedures: high  Management options: high      ED Course       Procedures

## 2017-12-06 NOTE — H&P
HISTORY AND PHYSICAL      Alise Rodrigues    12/6/2017    Date of Admission:  12/6/2017    The patient's chart is reviewed and the patient is discussed with the staff. Subjective:     Patient is a 81 y.o.  female presents with shortness of breath. Ms Donny Gallegos is a 81 yr old lady with ESRD, CHF EF40%, DM, orthostatic hypotension coming in from rehab with shortness of breath according to her daughter and being found hypotensive. Patient is not able to give much history but denies any chest pain, fever, chills or rigors. She does have a wound on her right leg that she thinks getting worse. Patient was found to be severely hypoxic in the ED requiring BIPAP 100% with POx 90%. I was called by the ED for admission but I asked for a CTA to rule out PE which came positive for bilateral PEs and I asked to start the heparin drip and went to assess her immediately. Review of Systems  A comprehensive review of systems was negative except for that written in the HPI.     Patient Active Problem List   Diagnosis Code    GERD (gastroesophageal reflux disease) K21.9    Lupus L93.0    Morbid obesity ([facility] E66.01    Hypothyroidism E03.9    CASSIE (obstructive sleep apnea) G47.33    DM (diabetes mellitus) ([facility] E11.9    Unable to bear weight R26.89    ESRD on hemodialysis ([facility] N18.6, Z99.2    Weakness of both lower limbs R29.898    Anemia of chronic disease D63.8    History of stroke Z86.73    Hemodialysis access, AV graft (HCC) Z99.2    Orthostatic hypotension I95.1    Chronic respiratory failure (HCC) J96.10    Chronic diastolic heart failure (HCC) I50.32    HTN (hypertension), ESSENTIAL I10    Hyperlipidemia E78.5    Coronary atherosclerosis of native coronary vessel I25.10    Pulmonary edema J81.1    ESRD (end stage renal disease) on dialysis (HCC) N18.6, H67.8    Systolic CHF, chronic (HCC) I50.22    Acute pulmonary embolism (HCC) I26.99       Prior to Admission Medications Prescriptions Last Dose Informant Patient Reported? Taking? OTHER   Yes No   Sig: Calcitonin [place]   200 ACT spray once daily in nose   Omega-3 Fatty Acids 300 mg cap   Yes No   Sig: Take 1,200 mg by mouth nightly. Last dose 16   acetaminophen (TYLENOL) 325 mg tablet   No No   Sig: Take 2 Tabs by mouth every four (4) hours as needed for Fever (for fever greater than 100.4 F). allopurinol (ZYLOPRIM) 100 mg tablet   Yes No   Sig: Take  by mouth every morning. clopidogrel (PLAVIX) 75 mg tablet   Yes No   Sig: Take  by mouth daily. cyanocobalamin (VITAMIN B-12) 1,000 mcg tablet   Yes No   Sig: Take 1,000 mcg by mouth every morning.   ergocalciferol (VITAMIN D2) 50,000 unit capsule   Yes No   Sig: Take 50,000 Units by mouth. Twice per week   fludrocortisone (FLORINEF) 0.1 mg tablet   Yes No   Sig: Take  by mouth nightly.   gabapentin (NEURONTIN) 100 mg capsule   Yes No   Sig: Take 200 mg by mouth three (3) times daily. insulin glargine (LANTUS) 100 unit/mL injection   No No   Si Units by SubCUTAneous route daily. levothyroxine (SYNTHROID) 175 mcg tablet   Yes No   Sig: Take 175 mcg by mouth Daily (before breakfast). midodrine (PROAMITINE) 2.5 mg tablet   Yes No   Sig: Take  by mouth three (3) times daily (with meals). pantoprazole (PROTONIX) 40 mg tablet   Yes No   Sig: Take 40 mg by mouth daily. sevelamer carbonate (RENVELA) 800 mg tab tab   Yes No   Sig: Take 800 mg by mouth three (3) times daily (with meals).       Facility-Administered Medications: None       Past Medical History:   Diagnosis Date    Anemia of chronic disease     Arthritis     arthritis OA - generalized multiple joints - pt says \"not really any problems\" (16)    Chronic diastolic heart failure (HCC)     Chronic kidney disease     HD, dialysis MWF in [place]    Chronic pain     pt denies this    Chronic respiratory failure (HCC)     Coagulation disorder ([facility]     pernicious anemia chronic - tx with procrit     Congestive heart failure, unspecified     Coronary atherosclerosis of native coronary vessel     CVA (cerebral vascular accident) ([address] 6/25/2014    right hand weakness    Diabetes ([address] 1980's    type II; avg fastings- 105, last HA1C 5.8 in May 2016? Hypo sxs @ 82    Diabetic neuropathy (HCC)     DM (diabetes mellitus) ([address] 7/16/2014    End stage renal disease ([address]     ESRD on hemodialysis ([address] 4/2014    multiple temporary access sites    Fibromyalgia     Gout     Heart failure ([address]     Dx 2009 FROM FLUID OVERLOAD FROM KIDNEY DISEASE    Hemodialysis access site with mature fistula ([address] 8/27/14    permanent AV site left forearm placed    Hemodialysis access, AV graft ([address] 9/9/2014 8/27/14 (Dr Christina Hobson) Brachial artery antecubital forearm loop fistula.       Hepatomegaly     not sure why this is here- per pt    History of stroke 8/28/2014    Previous right residual     Hypercholesterolemia     Hypothyroidism approx 2000    controlled with meds     Lupus     systemic    Morbid obesity ([address] 6/21/16    BMI- 39.2 (verbal)    NSVT (nonsustained ventricular tachycardia) ([address] 6/19/2014    Orthostatic hypotension     CASSIE (obstructive sleep apnea) 6/21/16    BIPAP     Poor historian 6/21/16    probably related to CVA in 2014    Sepsis ([address] 6/19/2014    UNCLEAR ETIOLOGY      Stroke ([address]     Unable to bear weight 8/28/2014    Unspecified sleep apnea 11/13    bi-pap    Vertigo as late effect of stroke 6/21/16    Weakness of both lower limbs 8/28/2014     Past Surgical History:   Procedure Laterality Date    COLONOSCOPY N/A 6/23/2016    COLONOSCOPY  BMI 37 performed by Elias Mejia [facility] at [facility] HX GI      insertion of Peritoneal port RLQ of abdomen     HX GI      removal of peritoneal port     HX HYSTERECTOMY  1975    unknown ovaries    HX LAP CHOLECYSTECTOMY  2009    HX VASCULAR ACCESS  2013 and 4    multiple temporaries    HX VASCULAR ACCESS  8/27/14    permanent placement left forearm    HX VASCULAR ACCESS      Subclavian port in R chest wall (active)     VASCULAR SURGERY PROCEDURE UNLIST  8-27-14    L Brachial artery antecubital forearm loop fistula    VASCULAR SURGERY PROCEDURE UNLIST      L UA AV shunt placement (inactive)      Social History     Social History    Marital status:      Spouse name: N/A    Number of children: N/A    Years of education: N/A     Occupational History    Not on file. Social History Main Topics    Smoking status: Never Smoker    Smokeless tobacco: Never Used    Alcohol use No    Drug use: No    Sexual activity: No     Other Topics Concern    Not on file     Social History Narrative    6/2014Married and lives with her . Disabled but previously worked as a . 8/28/14:  UNCHANGED FROM ABOVE. Family History   Problem Relation Age of Onset    Heart Disease Mother     Diabetes Mother     Lung Disease Mother     Heart Disease Father     Stroke Father     Diabetes Father     Cancer Sister     Diabetes Sister     Heart Disease Sister      Allergies   Allergen Reactions    Codeine Other (comments)     Pt can not recall type of reaction -- only remembers being told allergic        Current Facility-Administered Medications   Medication Dose Route Frequency    sodium chloride (NS) flush 5-10 mL  5-10 mL IntraVENous Q8H    sodium chloride (NS) flush 5-10 mL  5-10 mL IntraVENous PRN    heparin (porcine) injection 7,500 Units  80 Units/kg IntraVENous ONCE    heparin 25,000 units in dextrose 500 mL infusion  18-36 Units/kg/hr IntraVENous TITRATE     Current Outpatient Prescriptions   Medication Sig    OTHER Calcitonin [place]   200 ACT spray once daily in nose    ergocalciferol (VITAMIN D2) 50,000 unit capsule Take 50,000 Units by mouth. Twice per week    insulin glargine (LANTUS) 100 unit/mL injection 30 Units by SubCUTAneous route daily.     midodrine (PROAMITINE) 2.5 mg tablet Take  by mouth three (3) times daily (with meals).  pantoprazole (PROTONIX) 40 mg tablet Take 40 mg by mouth daily.  clopidogrel (PLAVIX) 75 mg tablet Take  by mouth daily.  Omega-3 Fatty Acids 300 mg cap Take 1,200 mg by mouth nightly. Last dose 6/20/16    allopurinol (ZYLOPRIM) 100 mg tablet Take  by mouth every morning.  gabapentin (NEURONTIN) 100 mg capsule Take 200 mg by mouth three (3) times daily.  levothyroxine (SYNTHROID) 175 mcg tablet Take 175 mcg by mouth Daily (before breakfast).  fludrocortisone (FLORINEF) 0.1 mg tablet Take  by mouth nightly.  acetaminophen (TYLENOL) 325 mg tablet Take 2 Tabs by mouth every four (4) hours as needed for Fever (for fever greater than 100.4 F).  sevelamer carbonate (RENVELA) 800 mg tab tab Take 800 mg by mouth three (3) times daily (with meals).  cyanocobalamin (VITAMIN B-12) 1,000 mcg tablet Take 1,000 mcg by mouth every morning. Objective:     Vitals:    12/06/17 0442 12/06/17 0458 12/06/17 0501 12/06/17 0600   BP: 122/66  124/65    Pulse: 76  74    Resp: 14  12    Temp:       SpO2: (!) 85% 90% 90% 91%   Weight:       Height:           PHYSICAL EXAM     Constitutional:  the patient is well developed, obese and in mild acute distress  EENMT:  Sclera clear, pupils equal, oral mucosa moist  Respiratory: clear equal air sounds bilaterally  Cardiovascular:  RRR without M,G,R  Gastrointestinal: soft and non-tender; with positive bowel sounds. Musculoskeletal: warm without cyanosis. There is + lower leg edema Rt >Lt. Skin:  no jaundice or rashes, right leg wound. Neurologic: no gross neuro deficits     Psychiatric:  alert and oriented x 4    CXRGiven the degree of rotation there is no evidence of acute  cardiopulmonary disease. CT chest :Bilateral central pulmonary emboli.     Small hiatal hernia. Coronary artery disease.     Gallstones.     Bilateral renal atrophy with bilateral renal calculi.        Recent Labs      12/06/17   0131   WBC 5. 3   HGB  9.1*   HCT  28.6*   PLT  144*     Recent Labs      12/06/17   0131   NA  137   K  3.9   CL  96*   GLU  219*   CO2  27   BUN  64*   CREA  7.45*   CA  10.0   ALB  3.3   TBILI  1.1   ALT  34   SGOT  39*     Recent Labs      12/06/17   0420  12/06/17   0112   PH  7.34*  7.37   PCO2  53*  48*   PO2  58*  51*   HCO3  28*  27*     No results for input(s): LCAD, LAC in the last 72 hours. Assessment:  (Medical Decision Making)     Acute severe hypoxemic respiratory failure secondary to massive bilateral PEs with no signs of hemodynamic instability at the moment. Patient is known with lupus but also has not been as mobile which can be both risks for PEs and most likely will need lifelong anticoagulation. Patient is ESRD on HD [facility]    Hospital Problems  Date Reviewed: 10/18/2017          Codes Class Noted POA    * (Principal)Acute pulmonary embolism ([facility] ICD-10-CM: I26.99  ICD-9-CM: 415.19  12/6/2017 Unknown        Systolic CHF, chronic (HCC) ICD-10-CM: I50.22  ICD-9-CM: 428.22, 428.0  10/18/2017 Yes        Orthostatic hypotension ICD-10-CM: I95.1  ICD-9-CM: 458.0  Unknown Yes        ESRD on hemodialysis ([facility] ICD-10-CM: N18.6, Z99.2  ICD-9-CM: 585.6, V45.11  8/28/2014 Yes        CASSIE (obstructive sleep apnea) ICD-10-CM: G47.33  ICD-9-CM: 327.23  7/16/2014 Yes    Overview Signed 7/16/2014  1:18 AM by Cecelia Cano NP     BIPAP             DM (diabetes mellitus) ([facility] ICD-10-CM: E11.9  ICD-9-CM: 250.00  7/16/2014 Yes        Lupus (Chronic) ICD-10-CM: L93.0  ICD-9-CM: 695.4  6/19/2014 Yes        Morbid obesity ([facility] (Chronic) ICD-10-CM: E66.01  ICD-9-CM: 278.01  6/19/2014 Yes              Plan:  (Medical Decision Making)     -- Will admit to ICU  for further medical management  -- Supplemental O2 with BIPAP and try to wean off non invasive  -- start heparin drip stat after a bolus. This was conveyed to the ED team.   -- Echo stat  -- stat troponin, BNP  -- US doppler of lower extremity ? DVT  -- consider local catheter guided TPA depending on the ECHO results   -- wound care for right leg wound   -- sliding scale insulin   -- nephrology consult to put back on schedule for HD  -- I had a discussion with the daughter who clearly expressed that her mother would not want to be resuscitated or put on life support. Patient is DNR. I have spent 40 minutes of critical care time bedside or in the unit exclusive of any billable procedures. Patient is needing ICU due to acute severe hypoxemic respiratory failure and impending respiratory arrest.     More than 50% of the time documented was spent in face-to-face contact with the patient and in the care of the patient on the floor/unit where the patient is located.     Fanny Blanco MD

## 2017-12-06 NOTE — PROGRESS NOTES
Problem: Falls - Risk of  Goal: *Absence of Falls  Document Moy Fall Risk and appropriate interventions in the flowsheet.    Outcome: Progressing Towards Goal  Fall Risk Interventions:  Mobility Interventions: Assess mobility with egress test, Bed/chair exit alarm, Communicate number of staff needed for ambulation/transfer, Mechanical lift, OT consult for ADLs, Patient to call before getting OOB, PT Consult for mobility concerns, PT Consult for assist device competence, Strengthening exercises (ROM-active/passive), Utilize walker, cane, or other assitive device, Utilize gait belt for transfers/ambulation              Elimination Interventions: Bed/chair exit alarm, Call light in reach, Elevated toilet seat, Patient to call for help with toileting needs, Toilet paper/wipes in reach, Toileting schedule/hourly rounds

## 2017-12-06 NOTE — DIALYSIS
HD treatment completed without complication. Total of 1.5 kg removed. Needles x 2 removed and pressure held until hemostasis achieved. VS stable, NAD. Report given to Shara Soto, [facility].      Patient Vitals for the past 4 hrs:   Temp Pulse Resp BP SpO2   12/06/17 1525 - - - - 100 %   12/06/17 1516 - 76 19 101/55 100 %   12/06/17 1507 - 77 24 103/55 100 %   12/06/17 1431 - 75 12 (!) 89/54 90 %   12/06/17 1417 - 75 15 91/55 (!) 88 %   12/06/17 1402 - 75 15 97/51 100 %   12/06/17 1346 - 74 30 94/55 97 %   12/06/17 1331 - 73 15 95/56 100 %   12/06/17 1316 - 74 17 92/55 100 %   12/06/17 1310 - 75 17 91/53 92 %   12/06/17 1308 - 75 17 91/53 96 %   12/06/17 1301 - 86 22 (!) 83/50 96 %   12/06/17 1246 - 73 17 97/53 98 %   12/06/17 1232 97.8 °F (36.6 °C) 71 16 98/57 94 %   12/06/17 1217 - 69 14 98/54 99 %   12/06/17 1201 - 68 12 106/57 96 %   12/06/17 1146 - 68 11 107/61 98 %

## 2017-12-06 NOTE — PROGRESS NOTES
Pt admitted to room 3308. On arrival, pt drowsy but oriented x4. Following commands. RT at bedside and pt placed on BiPAP, FiO2 100%, O2 Sat %. Lung sounds clear/diminished. NSR w/1st deg AVB and BBB, HR 71. /70 via Right arm cuff pressure. Left arm AVF site cdi; patent w/ +bruit/thrill. Abdomen soft, nontender, bowel sounds present. Pt on dialysis MWF; oliguric--Nephrology consult called. Pt denies any pain at this time. Dual skin assessment complete w/YEE Carlos. Skin dry, warm and flaky. Scattered bruising to BUE and abdomen. Few small scabs to BLE. Right lower leg reddened w/open wound--redressed w/xeroform and gauze wrap. Buttocks w/blanchable redness and some very light purplish discoloration. Gluteal fold w/very small open area. Allevyn placed to sacrum for protection. Pt repositioned in bed for comfort. Wound consult placed.

## 2017-12-06 NOTE — PROGRESS NOTES
Initial visit made to patient and a prayer was provided. A  card was left. The patient was on the bi pap machine.         [employer]

## 2017-12-06 NOTE — CONSULTS
[facility] Consultation    Admission Date:  12/6/2017    Admission Diagnosis:  Acute pulmonary embolism (HCC)    History of Present Illness:  Pt is a 82 yo WF on HD MWF at [facility]. Presented from rehab with SOB, found to have PE on CTA. Today is her usual HD day. No complaints other than SOB. Past Medical History:   Diagnosis Date    Anemia of chronic disease     Arthritis     arthritis OA - generalized multiple joints - pt says \"not really any problems\" (6/21/16)    Chronic diastolic heart failure (HCC)     Chronic kidney disease     HD, dialysis MWF in [facility]    Chronic pain     pt denies this    Chronic respiratory failure (HCC)     Coagulation disorder ([address]     pernicious anemia chronic - tx with procrit     Congestive heart failure, unspecified     Coronary atherosclerosis of native coronary vessel     CVA (cerebral vascular accident) ([address] 6/25/2014    right hand weakness    Diabetes ([address] 1980's    type II; avg fastings- 105, last HA1C 5.8 in May 2016? Hypo sxs @ 82    Diabetic neuropathy (HCC)     DM (diabetes mellitus) ([address] 7/16/2014    End stage renal disease ([address]     ESRD on hemodialysis ([address] 4/2014    multiple temporary access sites    Fibromyalgia     Gout     Heart failure ([address]     Dx 2009 FROM FLUID OVERLOAD FROM KIDNEY DISEASE    Hemodialysis access site with mature fistula ([address] 8/27/14    permanent AV site left forearm placed    Hemodialysis access, AV graft ([address] 9/9/2014 8/27/14 (Dr Cathleen Forbes) Brachial artery antecubital forearm loop fistula.       Hepatomegaly     not sure why this is here- per pt    History of stroke 8/28/2014    Previous right residual     Hypercholesterolemia     Hypothyroidism approx 2000    controlled with meds     Lupus     systemic    Morbid obesity ([address] 6/21/16    BMI- 39.2 (verbal)    NSVT (nonsustained ventricular tachycardia) ([address] 6/19/2014    Orthostatic hypotension     CASSIE (obstructive sleep apnea) 6/21/16    BIPAP     Poor historian 6/21/16    probably related to CVA in 2014    Sepsis ([facility] 6/19/2014    UNCLEAR ETIOLOGY      Stroke ([facility]     Unable to bear weight 8/28/2014    Unspecified sleep apnea 11/13    bi-pap    Vertigo as late effect of stroke 6/21/16    Weakness of both lower limbs 8/28/2014      Past Surgical History:   Procedure Laterality Date    COLONOSCOPY N/A 6/23/2016    COLONOSCOPY  BMI 37 performed by Geena Gonzalez [facility]    HX GI      insertion of Peritoneal port RLQ of abdomen     HX GI      removal of peritoneal port     HX HYSTERECTOMY  1975    unknown ovaries    HX LAP CHOLECYSTECTOMY  2009    HX VASCULAR ACCESS  2013 and 4    multiple temporaries    HX VASCULAR ACCESS  8/27/14    permanent placement left forearm    HX VASCULAR ACCESS      Subclavian port in R chest wall (active)     VASCULAR SURGERY PROCEDURE UNLIST  8-27-14    L Brachial artery antecubital forearm loop fistula    VASCULAR SURGERY PROCEDURE UNLIST      L UA AV shunt placement (inactive)       Current Facility-Administered Medications   Medication Dose Route Frequency    sodium chloride (NS) flush 5-10 mL  5-10 mL IntraVENous Q8H    sodium chloride (NS) flush 5-10 mL  5-10 mL IntraVENous PRN    heparin 25,000 units in dextrose 500 mL infusion  18-36 Units/kg/hr IntraVENous TITRATE    clopidogrel (PLAVIX) tablet 75 mg  75 mg Oral DAILY    fludrocortisone (FLORINEF) tablet 100 mcg  100 mcg Oral QHS    insulin glargine (LANTUS) injection 30 Units  30 Units SubCUTAneous DAILY    levothyroxine (SYNTHROID) tablet 175 mcg  175 mcg Oral ACB    midodrine (PROAMITINE) tablet 2.5 mg  2.5 mg Oral TID WITH MEALS    pantoprazole (PROTONIX) tablet 40 mg  40 mg Oral DAILY    insulin regular (NOVOLIN R, HUMULIN R) injection   SubCUTAneous Q6H     Allergies   Allergen Reactions    Codeine Other (comments)     Pt can not recall type of reaction -- only remembers being told allergic       Social History   Substance Use Topics    Smoking status: Never Smoker    Smokeless tobacco: Never Used    Alcohol use No      Family History   Problem Relation Age of Onset    Heart Disease Mother     Diabetes Mother     Lung Disease Mother     Heart Disease Father     Stroke Father     Diabetes Father     Cancer Sister     Diabetes Sister     Heart Disease Sister         Review of Systems:  Gen - no fever, appetite unchanged  CV - no chest pain, no palpitation  Lung - + shortness of breath, no cough  Abd - no tenderness, no nausea/vomiting, no diarrhea  Ext - no edema      Objective:  Vitals:    12/06/17 0849 12/06/17 0850 12/06/17 0901 12/06/17 0916   BP:   116/67 102/58   Pulse:   69 66   Resp:   28 (!) 31   Temp:       SpO2: 98%  95% 96%   Weight:  94.9 kg (209 lb 3.5 oz)     Height:         No intake or output data in the 24 hours ending 12/06/17 1013  Wt Readings from Last 3 Encounters:   12/06/17 94.9 kg (209 lb 3.5 oz)   09/22/17 94.8 kg (209 lb)   08/17/17 95.3 kg (210 lb)       GEN - on BiPap  Neck - no JVD  CV - regular, no murmur, no rub  Lung - clear bilaterally, lungs expand symmetrically  Chest wall - normal appearance  Abd - soft, nontender, bowel sounds present  Ext - no edema, left forearm AVG + bruit      Data Review:     Recent Labs      12/06/17   0714  12/06/17   0131   WBC   --   5.3   HGB   --   9.1*   HCT   --   28.6*   PLT   --   144*   INR  1.0   --         Recent Labs      12/06/17   0131   NA  137   K  3.9   CL  96*   CO2  27   BUN  64*   CREA  7.45*   CA  10.0   GLU  219*         Assessment:     Principal Problem:    Acute pulmonary embolism ([facility] (12/6/2017)    Active Problems:    Lupus (6/19/2014)      Morbid obesity (HCC) (6/19/2014)      CASSIE (obstructive sleep apnea) (7/16/2014)      Overview: BIPAP      DM (diabetes mellitus) ([facility] (7/16/2014)      ESRD on hemodialysis ([facility] (8/28/2014)      Orthostatic hypotension ()      Systolic CHF, chronic ([facility] (10/18/2017)      Acute respiratory failure ([facility] (12/6/2017)        Plan:     1. ESRD - HD MWF  - HD later today  2. Pulmonary embolism  3.  H/o CHF    Addendum:  Seen on HD at around 12:25 PM

## 2017-12-06 NOTE — WOUND CARE
Right lower leg with partial thickness skin loss unknown etiology, areas are irregular and consistent with skin tear versus venous stasis/ edema patient is on dialysis and does have edema in lower legs at times, patient is unable to recall what happened with the right leg. Right leg dressed with xeroform and Juanita Smart currently, would continue with every other day dressings. Patient spends a great deal of time sitting in wheelchair at home, has bilateral buttock/ perianal purple non-blanchable discoloration and intergluteal cleft/ fold with peeling chapped skin and 0.5x0.5x0.2cm open area with serosanguinous drainage, recommend zinc based barrier cream and continued Allevyn over sacral areas for prevention with frequent turning. Will monitor.

## 2017-12-06 NOTE — ED NOTES
TRANSFER - OUT REPORT:    Verbal report given to Ashwin Cartwright RN on Alise Rodrigues  being transferred to CCU for routine progression of care       Report consisted of patients Situation, Background, Assessment and   Recommendations(SBAR). Information from the following report(s) ED Summary was reviewed with the receiving nurse. Lines:   Peripheral IV 12/06/17 Left Other(comment) (Active)   Site Assessment Clean, dry, & intact 12/6/2017  1:27 AM       Peripheral IV 12/06/17 Right Antecubital (Active)   Site Assessment Clean, dry, & intact 12/6/2017  5:33 AM        Opportunity for questions and clarification was provided.       Patient transported with:   Shanthi Oro, family member, IV fluids, belongings

## 2017-12-06 NOTE — PROGRESS NOTES
JANELLE completed and results given to Dr Gloria Brower Patient placed on BiPAP at documented settings, patient tolerating well at this time.

## 2017-12-06 NOTE — DIALYSIS
Hemodialysis bedside ICU treatment initiated using left forearm AVG and 15 Gauge needles. Consent obtained. Pt alert and VS stable. Machine settings per MD order. Will monitor during treatment. Report given to Roshni Blood [place].

## 2017-12-06 NOTE — CONSULTS
Department of Interventional Radiology  [phone]        Consult Note     Patient: Rob Joshua MRN: [id number]  SSN: [id number]    YOB: 1945  Age: 81 y.o. Sex: female      Referring Physician: Dr. Emily Ramos Date: 12/6/2017     Subjective:     Chief Complaint: PE    History of Present Illness: Rob Joshua is a 81 y.o. female who is seen in consultation for possible PA thrombolysis. Pt presented to ED early this morning from her rehab facility with c/o SOB. She was found to have bilateral PE and was promptly started on heparin and placed on BiPAP and admitted to the CCU. Hx significant for ESRD on HD, CVA on Plavix, CAD, CHF, EF 35-40%, anemia, DM, Lupus which has limited her mobility. She does ambulate some with a rolling walker. She was recently discharged from [facility] after falling and injuring her right leg. From there, she went to short term rehab. CT scan reveals bilateral, large PE, no saddle emboli. Echo with mildly dilated RV and mildly reduced function. LE DVT US pending. Pt is a DNR and wishes to not be intubated. She reports that she is breathing easier; although, she is still requiring 100% Fio2. Past Medical History:   Diagnosis Date    Anemia of chronic disease     Arthritis     arthritis OA - generalized multiple joints - pt says \"not really any problems\" (6/21/16)    Chronic diastolic heart failure (HCC)     Chronic kidney disease     HD, dialysis MWF in [facility]    Chronic pain     pt denies this    Chronic respiratory failure (HCC)     Coagulation disorder ([address]     pernicious anemia chronic - tx with procrit     Congestive heart failure, unspecified     Coronary atherosclerosis of native coronary vessel     CVA (cerebral vascular accident) ([address] 6/25/2014    right hand weakness    Diabetes ([address] 1980's    type II; avg fastings- 105, last HA1C 5.8 in May 2016?  Hypo sxs @ 80    Diabetic neuropathy (HCC)     DM (diabetes mellitus) ([address] 7/16/2014    End stage renal disease ([address]     ESRD on hemodialysis ([address] 4/2014    multiple temporary access sites    Fibromyalgia     Gout     Heart failure ([address]     Dx 2009 FROM FLUID OVERLOAD FROM KIDNEY DISEASE    Hemodialysis access site with mature fistula ([address] 8/27/14    permanent AV site left forearm placed    Hemodialysis access, AV graft ([address] 9/9/2014 8/27/14 (Dr Christina Hobson) Brachial artery antecubital forearm loop fistula.       Hepatomegaly     not sure why this is here- per pt    History of stroke 8/28/2014    Previous right residual     Hypercholesterolemia     Hypothyroidism approx 2000    controlled with meds     Lupus     systemic    Morbid obesity ([address] 6/21/16    BMI- 39.2 (verbal)    NSVT (nonsustained ventricular tachycardia) ([address] 6/19/2014    Orthostatic hypotension     CASSIE (obstructive sleep apnea) 6/21/16    BIPAP     Poor historian 6/21/16    probably related to CVA in 2014    Sepsis ([address] 6/19/2014    UNCLEAR ETIOLOGY      Stroke ([address]     Unable to bear weight 8/28/2014    Unspecified sleep apnea 11/13    bi-pap    Vertigo as late effect of stroke 6/21/16    Weakness of both lower limbs 8/28/2014     Past Surgical History:   Procedure Laterality Date    COLONOSCOPY N/A 6/23/2016    COLONOSCOPY  BMI 37 performed by Elias Mejia MD at [facility] HX GI      insertion of Peritoneal port RLQ of abdomen     HX GI      removal of peritoneal port     HX HYSTERECTOMY  1975    unknown ovaries    HX LAP CHOLECYSTECTOMY  2009    HX VASCULAR ACCESS  2013 and 4    multiple temporaries    HX VASCULAR ACCESS  8/27/14    permanent placement left forearm    HX VASCULAR ACCESS      Subclavian port in R chest wall (active)     VASCULAR SURGERY PROCEDURE UNLIST  8-27-14    L Brachial artery antecubital forearm loop fistula    VASCULAR SURGERY PROCEDURE UNLIST      L UA AV shunt placement (inactive)       Family History   Problem Relation Age of Onset    Heart Disease Mother     Diabetes Mother     Lung Disease Mother     Heart Disease Father     Stroke Father     Diabetes Father     Cancer Sister     Diabetes Sister     Heart Disease Sister      Social History   Substance Use Topics    Smoking status: Never Smoker    Smokeless tobacco: Never Used    Alcohol use No      Allergies   Allergen Reactions    Codeine Other (comments)     Pt can not recall type of reaction -- only remembers being told allergic      Current Facility-Administered Medications   Medication Dose Route Frequency    sodium chloride (NS) flush 5-10 mL  5-10 mL IntraVENous Q8H    sodium chloride (NS) flush 5-10 mL  5-10 mL IntraVENous PRN    heparin 25,000 units in dextrose 500 mL infusion  18-36 Units/kg/hr IntraVENous TITRATE    clopidogrel (PLAVIX) tablet 75 mg  75 mg Oral DAILY    fludrocortisone (FLORINEF) tablet 100 mcg  100 mcg Oral QHS    insulin glargine (LANTUS) injection 30 Units  30 Units SubCUTAneous DAILY    levothyroxine (SYNTHROID) tablet 175 mcg  175 mcg Oral ACB    midodrine (PROAMITINE) tablet 2.5 mg  2.5 mg Oral TID WITH MEALS    pantoprazole (PROTONIX) tablet 40 mg  40 mg Oral DAILY    insulin regular (NOVOLIN R, HUMULIN R) injection   SubCUTAneous Q6H        Review of Systems:  A detailed 10 organ review of systems is obtained with pertinent positives as listed in the History of Present Illness and Past Medical History. All others are negative. Objective:     Physical Exam:  Visit Vitals    BP (!) 89/54    Pulse 75    Temp 97.8 °F (36.6 °C)    Resp 12    Ht 5' 3\" (1.6 m)    Wt 94.9 kg (209 lb 3.5 oz)    SpO2 90%    Breastfeeding No    BMI 37.06 kg/m2      Gen: awake, alert, NAD.   Obese  HEART: regular rate and rhythm  LUNG: clear to auscultation bilaterally  ABDOMEN: normal findings: soft, non-tender, obese  WOUND: warm, no edema, small superficial wound right pretibial area, dressed  Lab/Data Review:  CMP:   Lab Results   Component Value Date/Time     12/06/2017 01:31 AM    K 3.9 12/06/2017 01:31 AM    CL 96 (L) 12/06/2017 01:31 AM    CO2 27 12/06/2017 01:31 AM    AGAP 14 12/06/2017 01:31 AM     (H) 12/06/2017 01:31 AM    BUN 64 (H) 12/06/2017 01:31 AM    CREA 7.45 (H) 12/06/2017 01:31 AM    GFRAA 7 (L) 12/06/2017 01:31 AM    GFRNA 6 (L) 12/06/2017 01:31 AM    CA 10.0 12/06/2017 01:31 AM    ALB 3.3 12/06/2017 01:31 AM    TP 7.3 12/06/2017 01:31 AM    GLOB 4.0 (H) 12/06/2017 01:31 AM    AGRAT 0.8 (L) 12/06/2017 01:31 AM    SGOT 39 (H) 12/06/2017 01:31 AM    ALT 34 12/06/2017 01:31 AM     CBC:   Lab Results   Component Value Date/Time    WBC 5.3 12/06/2017 01:31 AM    HGB 9.1 (L) 12/06/2017 01:31 AM    HCT 28.6 (L) 12/06/2017 01:31 AM     (L) 12/06/2017 01:31 AM     COAGS:   Lab Results   Component Value Date/Time    APTT 188.7 (HH) 12/06/2017 01:03 PM    PTP 12.8 12/06/2017 07:14 AM    INR 1.0 12/06/2017 07:14 AM         Assessment/Plan:   Bilateral PE, SOB, debility. At this time, the patient wishes to hold off on any catheter directed therapies. She feels like she is making improvements on heparin. Due to her debility and multiple comorbid conditions, that is a very reasonable decision. Procedure would likely require anesthesia support, and I don't believe she would tolerate reclining to 20-30 degrees at this point. I had a long telephone discussion with her daughter and she agrees with whatever her mother decides. We will check back in the morning. D/w MD Venancio Diaz PA-C    Principal Problem:    Acute pulmonary embolism ([address] (12/6/2017)    Active Problems:    Lupus (6/19/2014)      Morbid obesity ([facility] (6/19/2014)      CASSIE (obstructive sleep apnea) (7/16/2014)      Overview: BIPAP      DM (diabetes mellitus) ([facility] (7/16/2014)      ESRD on hemodialysis ([facility] (8/28/2014)      Orthostatic hypotension ()      Systolic CHF, chronic ([facility] (10/18/2017)      Acute respiratory failure ([facility] (12/6/2017)

## 2017-12-06 NOTE — PROGRESS NOTES
TRANSFER - IN REPORT:    Verbal report received from Ethel Kirkland RN on [employer]  being received from ER for routine progression of care      Report consisted of patients Situation, Background, Assessment and   Recommendations(SBAR). Information from the following report(s) SBAR, Kardex, ED Summary, Intake/Output, MAR, Recent Results and Cardiac Rhythm SR was reviewed with the receiving nurse. Opportunity for questions and clarification was provided. Assessment completed upon patients arrival to unit and care assumed.

## 2017-12-07 ENCOUNTER — APPOINTMENT (OUTPATIENT)
Dept: INTERVENTIONAL RADIOLOGY/VASCULAR | Age: 72
DRG: 175 | End: 2017-12-07
Attending: INTERNAL MEDICINE
Payer: MEDICARE

## 2017-12-07 PROBLEM — S81.801A LEG WOUND, RIGHT: Status: ACTIVE | Noted: 2017-12-07

## 2017-12-07 LAB
ANION GAP SERPL CALC-SCNC: 11 MMOL/L (ref 7–16)
APTT PPP: 147.1 SEC (ref 23.2–35.3)
APTT PPP: 54.5 SEC (ref 23.2–35.3)
ARTERIAL PATENCY WRIST A: POSITIVE
BASE EXCESS BLDA CALC-SCNC: 5.2 MMOL/L (ref 0–3)
BDY SITE: ABNORMAL
BUN SERPL-MCNC: 27 MG/DL (ref 8–23)
CA-I BLD-SCNC: 1.27 MMOL/L (ref 1–1.3)
CALCIUM SERPL-MCNC: 9.6 MG/DL (ref 8.3–10.4)
CHLORIDE BLDA-SCNC: 94 MMOL/L (ref 98–106)
CHLORIDE SERPL-SCNC: 97 MMOL/L (ref 98–107)
CO2 SERPL-SCNC: 30 MMOL/L (ref 21–32)
COHGB MFR BLD: 1 % (ref 0.5–1.5)
CREAT SERPL-MCNC: 4.64 MG/DL (ref 0.6–1)
DO-HGB BLD-MCNC: 8 % (ref 0–5)
ERYTHROCYTE [DISTWIDTH] IN BLOOD BY AUTOMATED COUNT: 17.8 % (ref 11.9–14.6)
ERYTHROCYTE [DISTWIDTH] IN BLOOD BY AUTOMATED COUNT: 17.8 % (ref 11.9–14.6)
FIBRINOGEN PPP-MCNC: 476 MG/DL (ref 190–501)
FIO2 ON VENT: 80 %
GLUCOSE BLD STRIP.AUTO-MCNC: 100 MG/DL (ref 65–100)
GLUCOSE BLD STRIP.AUTO-MCNC: 127 MG/DL (ref 65–100)
GLUCOSE BLD STRIP.AUTO-MCNC: 128 MG/DL (ref 65–100)
GLUCOSE BLD STRIP.AUTO-MCNC: 138 MG/DL (ref 65–100)
GLUCOSE SERPL-MCNC: 115 MG/DL (ref 65–100)
HCO3 BLDA-SCNC: 30 MMOL/L (ref 22–26)
HCT VFR BLD AUTO: 26.2 % (ref 35.8–46.3)
HCT VFR BLD AUTO: 29.2 % (ref 35.8–46.3)
HGB BLD-MCNC: 8.1 G/DL (ref 11.7–15.4)
HGB BLD-MCNC: 9 G/DL (ref 11.7–15.4)
HGB BLDMV-MCNC: 9.1 GM/DL (ref 11.7–15)
INR PPP: 1.1
MCH RBC QN AUTO: 34 PG (ref 26.1–32.9)
MCH RBC QN AUTO: 34 PG (ref 26.1–32.9)
MCHC RBC AUTO-ENTMCNC: 30.8 G/DL (ref 31.4–35)
MCHC RBC AUTO-ENTMCNC: 30.9 G/DL (ref 31.4–35)
MCV RBC AUTO: 110.1 FL (ref 79.6–97.8)
MCV RBC AUTO: 110.2 FL (ref 79.6–97.8)
METHGB MFR BLD: 0.3 % (ref 0–1.5)
OXYHGB MFR BLDA: 90.7 % (ref 94–97)
PCO2 BLDA: 46 MMHG (ref 35–45)
PEEP RESPIRATORY: 10 CM[H2O]
PH BLDA: 7.43 [PH] (ref 7.35–7.45)
PLATELET # BLD AUTO: 125 K/UL (ref 150–450)
PLATELET # BLD AUTO: 145 K/UL (ref 150–450)
PMV BLD AUTO: 9.5 FL (ref 10.8–14.1)
PMV BLD AUTO: 9.8 FL (ref 10.8–14.1)
PO2 BLDA: 61 MMHG (ref 80–105)
POTASSIUM BLDA-SCNC: 3.89 MMOL/L (ref 3.5–5.3)
POTASSIUM SERPL-SCNC: 4.2 MMOL/L (ref 3.5–5.1)
PROTHROMBIN TIME: 14.1 SEC (ref 11.5–14.5)
RBC # BLD AUTO: 2.38 M/UL (ref 4.05–5.25)
RBC # BLD AUTO: 2.65 M/UL (ref 4.05–5.25)
RESP RATE: 15
SAO2 % BLD: 92 % (ref 92–98.5)
SERVICE CMNT-IMP: ABNORMAL
SODIUM BLDA-SCNC: 132.9 MMOL/L (ref 135–148)
SODIUM SERPL-SCNC: 138 MMOL/L (ref 136–145)
VENTILATION MODE VENT: ABNORMAL
WBC # BLD AUTO: 5 K/UL (ref 4.3–11.1)
WBC # BLD AUTO: 5.3 K/UL (ref 4.3–11.1)

## 2017-12-07 PROCEDURE — 36600 WITHDRAWAL OF ARTERIAL BLOOD: CPT

## 2017-12-07 PROCEDURE — 74011250637 HC RX REV CODE- 250/637: Performed by: INTERNAL MEDICINE

## 2017-12-07 PROCEDURE — 94660 CPAP INITIATION&MGMT: CPT

## 2017-12-07 PROCEDURE — 65620000000 HC RM CCU GENERAL

## 2017-12-07 PROCEDURE — 77030002986 HC SUT PROL J&J -A

## 2017-12-07 PROCEDURE — 85610 PROTHROMBIN TIME: CPT | Performed by: PHYSICIAN ASSISTANT

## 2017-12-07 PROCEDURE — 77030004636

## 2017-12-07 PROCEDURE — 77030013794 HC KT TRNSDUC BLD EDWD -B

## 2017-12-07 PROCEDURE — C1894 INTRO/SHEATH, NON-LASER: HCPCS

## 2017-12-07 PROCEDURE — 74011250636 HC RX REV CODE- 250/636: Performed by: PHYSICIAN ASSISTANT

## 2017-12-07 PROCEDURE — B31U1ZZ FLUOROSCOPY OF PULMONARY TRUNK USING LOW OSMOLAR CONTRAST: ICD-10-PCS | Performed by: RADIOLOGY

## 2017-12-07 PROCEDURE — 77030021532 HC CATH ANGI DX IMPRS MRTM -B

## 2017-12-07 PROCEDURE — 36013 PLACE CATHETER IN ARTERY: CPT

## 2017-12-07 PROCEDURE — C1769 GUIDE WIRE: HCPCS

## 2017-12-07 PROCEDURE — 74011636320 HC RX REV CODE- 636/320: Performed by: RADIOLOGY

## 2017-12-07 PROCEDURE — 37211 THROMBOLYTIC ART THERAPY: CPT

## 2017-12-07 PROCEDURE — 75746 ARTERY X-RAYS LUNG: CPT

## 2017-12-07 PROCEDURE — 85730 THROMBOPLASTIN TIME PARTIAL: CPT | Performed by: INTERNAL MEDICINE

## 2017-12-07 PROCEDURE — B51B1ZA FLUOROSCOPY OF RIGHT LOWER EXTREMITY VEINS USING LOW OSMOLAR CONTRAST, GUIDANCE: ICD-10-PCS | Performed by: RADIOLOGY

## 2017-12-07 PROCEDURE — 85027 COMPLETE CBC AUTOMATED: CPT | Performed by: INTERNAL MEDICINE

## 2017-12-07 PROCEDURE — 77030027688 HC DRSG MEPILEX 16-48IN NO BORD MOLN -A

## 2017-12-07 PROCEDURE — 74011250636 HC RX REV CODE- 250/636: Performed by: RADIOLOGY

## 2017-12-07 PROCEDURE — 80048 BASIC METABOLIC PNL TOTAL CA: CPT | Performed by: INTERNAL MEDICINE

## 2017-12-07 PROCEDURE — 99291 CRITICAL CARE FIRST HOUR: CPT | Performed by: INTERNAL MEDICINE

## 2017-12-07 PROCEDURE — 36415 COLL VENOUS BLD VENIPUNCTURE: CPT | Performed by: INTERNAL MEDICINE

## 2017-12-07 PROCEDURE — 85384 FIBRINOGEN ACTIVITY: CPT | Performed by: PHYSICIAN ASSISTANT

## 2017-12-07 PROCEDURE — 74011250636 HC RX REV CODE- 250/636: Performed by: INTERNAL MEDICINE

## 2017-12-07 PROCEDURE — 82803 BLOOD GASES ANY COMBINATION: CPT

## 2017-12-07 PROCEDURE — 5A1D70Z PERFORMANCE OF URINARY FILTRATION, INTERMITTENT, LESS THAN 6 HOURS PER DAY: ICD-10-PCS | Performed by: RADIOLOGY

## 2017-12-07 PROCEDURE — 74011000250 HC RX REV CODE- 250: Performed by: RADIOLOGY

## 2017-12-07 PROCEDURE — 76937 US GUIDE VASCULAR ACCESS: CPT

## 2017-12-07 PROCEDURE — 82962 GLUCOSE BLOOD TEST: CPT

## 2017-12-07 RX ORDER — LIDOCAINE HYDROCHLORIDE 20 MG/ML
20-200 INJECTION, SOLUTION INFILTRATION; PERINEURAL
Status: DISCONTINUED | OUTPATIENT
Start: 2017-12-07 | End: 2017-12-07 | Stop reason: ALTCHOICE

## 2017-12-07 RX ORDER — HEPARIN SODIUM 5000 [USP'U]/ML
35 INJECTION, SOLUTION INTRAVENOUS; SUBCUTANEOUS ONCE
Status: COMPLETED | OUTPATIENT
Start: 2017-12-07 | End: 2017-12-07

## 2017-12-07 RX ORDER — HEPARIN SODIUM 5000 [USP'U]/100ML
400 INJECTION, SOLUTION INTRAVENOUS CONTINUOUS
Status: DISCONTINUED | OUTPATIENT
Start: 2017-12-07 | End: 2017-12-08 | Stop reason: ALTCHOICE

## 2017-12-07 RX ORDER — HEPARIN SODIUM 200 [USP'U]/100ML
1000 INJECTION, SOLUTION INTRAVENOUS
Status: DISCONTINUED | OUTPATIENT
Start: 2017-12-07 | End: 2017-12-07 | Stop reason: ALTCHOICE

## 2017-12-07 RX ADMIN — MIDODRINE HYDROCHLORIDE 5 MG: 5 TABLET ORAL at 12:40

## 2017-12-07 RX ADMIN — Medication 10 ML: at 22:05

## 2017-12-07 RX ADMIN — Medication 10 ML: at 14:00

## 2017-12-07 RX ADMIN — HEPARIN SODIUM AND DEXTROSE 15 UNITS/KG/HR: 5000; 5 INJECTION INTRAVENOUS at 00:38

## 2017-12-07 RX ADMIN — HEPARIN SODIUM 3300 UNITS: 5000 INJECTION, SOLUTION INTRAVENOUS; SUBCUTANEOUS at 05:42

## 2017-12-07 RX ADMIN — IOPAMIDOL 35 ML: 612 INJECTION, SOLUTION INTRAVENOUS at 15:59

## 2017-12-07 RX ADMIN — HEPARIN SODIUM 2000 UNITS: 200 INJECTION, SOLUTION INTRAVENOUS at 15:49

## 2017-12-07 RX ADMIN — Medication: at 08:37

## 2017-12-07 RX ADMIN — PANTOPRAZOLE SODIUM 40 MG: 40 TABLET, DELAYED RELEASE ORAL at 08:37

## 2017-12-07 RX ADMIN — LIDOCAINE HYDROCHLORIDE 200 MG: 20 INJECTION, SOLUTION INFILTRATION; PERINEURAL at 15:30

## 2017-12-07 RX ADMIN — LIDOCAINE HYDROCHLORIDE 200 MG: 20 INJECTION, SOLUTION INFILTRATION; PERINEURAL at 15:33

## 2017-12-07 RX ADMIN — CLOPIDOGREL BISULFATE 75 MG: 75 TABLET ORAL at 08:37

## 2017-12-07 RX ADMIN — HEPARIN SODIUM AND DEXTROSE 400 UNITS/HR: 5000; 5 INJECTION INTRAVENOUS at 16:17

## 2017-12-07 RX ADMIN — HEPARIN SODIUM 2000 UNITS: 200 INJECTION, SOLUTION INTRAVENOUS at 15:33

## 2017-12-07 RX ADMIN — ALTEPLASE 1 MG/HR: KIT at 16:14

## 2017-12-07 RX ADMIN — HEPARIN SODIUM 2000 UNITS: 200 INJECTION, SOLUTION INTRAVENOUS at 15:47

## 2017-12-07 RX ADMIN — LEVOTHYROXINE SODIUM 175 MCG: 50 TABLET ORAL at 08:37

## 2017-12-07 RX ADMIN — MIDODRINE HYDROCHLORIDE 5 MG: 5 TABLET ORAL at 18:12

## 2017-12-07 RX ADMIN — Medication 10 ML: at 05:46

## 2017-12-07 RX ADMIN — FLUDROCORTISONE ACETATE 100 MCG: 0.1 TABLET ORAL at 22:08

## 2017-12-07 RX ADMIN — HEPARIN SODIUM 2000 UNITS: 200 INJECTION, SOLUTION INTRAVENOUS at 15:35

## 2017-12-07 RX ADMIN — Medication: at 22:05

## 2017-12-07 RX ADMIN — MIDODRINE HYDROCHLORIDE 5 MG: 5 TABLET ORAL at 08:37

## 2017-12-07 RX ADMIN — LIDOCAINE HYDROCHLORIDE 60 MG: 20 INJECTION, SOLUTION INFILTRATION; PERINEURAL at 15:38

## 2017-12-07 NOTE — PROGRESS NOTES
Pt admitted from [facility]. She is not bed hold per Ivelisse Mooney, but will take back. CM to follow and assist with d/c POC.

## 2017-12-07 NOTE — PROGRESS NOTES
TRANSFER - OUT REPORT:    Verbal report given to Mel Barrientos RN (name) on Renee Brannon  being transferred to CCU (unit) for routine progression of care       Report consisted of patients Situation, Background, Assessment and   Recommendations(SBAR). Information from the following report(s) Procedure Summary, Intake/Output and MAR was reviewed with the receiving nurse. Lines:   Peripheral IV 12/06/17 Left Other(comment) (Active)   Site Assessment Clean, dry, & intact 12/7/2017  8:20 AM   Phlebitis Assessment 0 12/7/2017  8:20 AM   Infiltration Assessment 0 12/7/2017  8:20 AM   Dressing Status Clean, dry, & intact 12/7/2017  8:20 AM   Dressing Type Tape;Transparent 12/7/2017  8:20 AM   Hub Color/Line Status Pink;Capped;Flushed;Patent 12/7/2017  8:20 AM   Alcohol Cap Used No 12/7/2017  8:20 AM       Peripheral IV 12/06/17 Right Antecubital (Active)   Site Assessment Clean, dry, & intact 12/7/2017  8:20 AM   Phlebitis Assessment 0 12/7/2017  8:20 AM   Infiltration Assessment 0 12/7/2017  8:20 AM   Dressing Status Clean, dry, & intact 12/7/2017  8:20 AM   Dressing Type Tape;Transparent 12/7/2017  8:20 AM   Hub Color/Line Status Pink; Infusing;Flushed;Patent 12/7/2017  8:20 AM   Alcohol Cap Used No 12/7/2017  8:20 AM       Peripheral IV 12/07/17 Right; Inner Forearm (Active)   Site Assessment Clean, dry, & intact 12/7/2017  2:15 PM   Phlebitis Assessment 0 12/7/2017  2:15 PM   Infiltration Assessment 0 12/7/2017  2:15 PM   Dressing Status Clean, dry, & intact 12/7/2017  2:15 PM   Dressing Type Tape;Transparent 12/7/2017  2:15 PM   Hub Color/Line Status Pink;Capped;Flushed;Patent 12/7/2017  2:15 PM   Alcohol Cap Used No 12/7/2017  2:15 PM        Opportunity for questions and clarification was provided.       Patient transported with:   Monitor  Registered Nurse

## 2017-12-07 NOTE — PROGRESS NOTES
Report received. Pt lying quietly in bed, in NAD. Remains on BiPAP, FiO2 down to 80%, O2 Sat 96%. Dual verification of heparin gtt infusing at 17 units/kg/hr. ABG to be done. NSR w/1st deg HB and BBB, HR 79. SBP 120s. Pt denies any complaints. States breathing \"feels ok right now. \"  Call light in reach.

## 2017-12-07 NOTE — PROGRESS NOTES
TRANSFER - OUT REPORT:    Verbal report given to Cassidy Sweeney RN on [employer]  being transferred to IR for ordered procedure       Report consisted of patients Situation, Background, Assessment and   Recommendations(SBAR). Information from the following report(s) SBAR, Kardex, ED Summary, Intake/Output, MAR, Recent Results and Cardiac Rhythm NSR w/1st deg AVB and BBB was reviewed with the receiving nurse. Lines:   Peripheral IV 12/06/17 Left Other(comment) (Active)   Site Assessment Clean, dry, & intact 12/7/2017  8:20 AM   Phlebitis Assessment 0 12/7/2017  8:20 AM   Infiltration Assessment 0 12/7/2017  8:20 AM   Dressing Status Clean, dry, & intact 12/7/2017  8:20 AM   Dressing Type Tape;Transparent 12/7/2017  8:20 AM   Hub Color/Line Status Pink;Capped;Flushed;Patent 12/7/2017  8:20 AM   Alcohol Cap Used No 12/7/2017  8:20 AM       Peripheral IV 12/06/17 Right Antecubital (Active)   Site Assessment Clean, dry, & intact 12/7/2017  8:20 AM   Phlebitis Assessment 0 12/7/2017  8:20 AM   Infiltration Assessment 0 12/7/2017  8:20 AM   Dressing Status Clean, dry, & intact 12/7/2017  8:20 AM   Dressing Type Tape;Transparent 12/7/2017  8:20 AM   Hub Color/Line Status Pink; Infusing;Flushed;Patent 12/7/2017  8:20 AM   Alcohol Cap Used No 12/7/2017  8:20 AM       Peripheral IV 12/07/17 Right; Inner Forearm (Active)   Site Assessment Clean, dry, & intact 12/7/2017  2:15 PM   Phlebitis Assessment 0 12/7/2017  2:15 PM   Infiltration Assessment 0 12/7/2017  2:15 PM   Dressing Status Clean, dry, & intact 12/7/2017  2:15 PM   Dressing Type Tape;Transparent 12/7/2017  2:15 PM   Hub Color/Line Status Pink;Capped;Flushed;Patent 12/7/2017  2:15 PM   Alcohol Cap Used No 12/7/2017  2:15 PM        Opportunity for questions and clarification was provided. Patient transported with:   Monitor  O2 @ NRB liters  Registered Nurse   RT-- Pt placed back on BiPAP after arrival to IR room.

## 2017-12-07 NOTE — PROGRESS NOTES
Critical Care Daily Progress Note: 12/7/2017    Siddharth Ramesh   Admission Date: 12/6/2017         The patient's chart is reviewed and the patient is discussed with the staff. Siddharth Ramesh is a 71yo F who was admitted on 12/6 with ESRD, CHF 40%, DM, orhtostatic hypotension who presented from rehab with right leg wound and severe hypoxia and was found to have large bilateral pulmonary emboli. Subjective:   Still remains on high FiO2 with 80% BiPAP (PO2 in 60s on this)  BNP 1281 and TTE with reduced RV systolic function (as well as LVEF 35-40%) and moderately elevated pulmonary pressures. CT without much evidence of pulmonary edema and no crackles    Today she says her nasal bridge and R leg are painful.   She doesn't remember conversations yesterday about thrombolysis    Current Facility-Administered Medications   Medication Dose Route Frequency    sodium chloride (NS) flush 5-10 mL  5-10 mL IntraVENous Q8H    sodium chloride (NS) flush 5-10 mL  5-10 mL IntraVENous PRN    heparin 25,000 units in dextrose 500 mL infusion  18-36 Units/kg/hr IntraVENous TITRATE    clopidogrel (PLAVIX) tablet 75 mg  75 mg Oral DAILY    fludrocortisone (FLORINEF) tablet 100 mcg  100 mcg Oral QHS    levothyroxine (SYNTHROID) tablet 175 mcg  175 mcg Oral ACB    pantoprazole (PROTONIX) tablet 40 mg  40 mg Oral DAILY    insulin regular (NOVOLIN R, HUMULIN R) injection   SubCUTAneous Q6H    acetaminophen (TYLENOL) tablet 650 mg  650 mg Oral Q4H PRN    zinc oxide-cod liver oil (DESITIN) 40 % paste   Topical BID    midodrine (PROAMITINE) tablet 5 mg  5 mg Oral TID WITH MEALS       Review of Systems  Constitutional:  negative for fever, chills, sweats  Cardiovascular:  negative for chest pain, palpitations, syncope  Negative for dysphagia, reflux, vomiting, diarrhea, abdominal pain, or melena  Neurologic:  negative for focal weakness, numbness, headache      Objective:     Vitals:    12/07/17 0432 12/07/17 0501 12/07/17 0531 12/07/17 0601   BP: 108/61 108/66 114/60 111/62   Pulse: 77 79 79 79   Resp: 15 19 19 24   Temp:       SpO2: 93% 93% 93% 92%   Weight:       Height:           Intake and Output:   12/05 1901 - 12/07 0700  In: 543 [P.O.:240; I.V.:303]  Out: 1500        Physical Exam:          Constitutional:  the patient is overweight and  EENMT:  Sclera clear, pupils equal, oral mucosa moist  Respiratory: CTA bilaterally,   Cardiovascular:  RRR without M,G,R  Gastrointestinal: soft and non-tender; with positive bowel sounds. Musculoskeletal: warm without cyanosis. There is + lower leg edema. Skin:  no jaundice or rashes,Right anterior leg wounds   Neurologic: no gross neuro deficits     Psychiatric:  alert and oriented x 3    LINES:  c diff    DRIPS:   heparin    CT Chest:      LAB  Recent Labs      12/07/17   0547  12/06/17   2340  12/06/17   1748  12/06/17   1240  12/06/17   0842   GLUCPOC  127*  128*  135*  122*  133*      Recent Labs      12/07/17   0355  12/06/17   0714  12/06/17   0131   WBC  5.3   --   5.3   HGB  9.0*   --   9.1*   HCT  29.2*   --   28.6*   PLT  145*   --   144*   INR   --   1.0   --      Recent Labs      12/07/17   0355  12/06/17   0715  12/06/17   0131   NA  138   --   137   K  4.2   --   3.9   CL  97*   --   96*   CO2  30   --   27   GLU  115*   --   219*   BUN  27*   --   64*   CREA  4.64*   --   7.45*   CA  9.6   --   10.0   TROIQ   --   0.33*   --    ALB   --    --   3.3   TBILI   --    --   1.1   ALT   --    --   34   SGOT   --    --   39*     Recent Labs      12/06/17   1012  12/06/17   0420  12/06/17   0112   PH  7.35  7.34*  7.37   PCO2  52*  53*  48*   PO2  67*  58*  51*   HCO3  28*  28*  27*     No results for input(s): LCAD, LAC in the last 72 hours. Duplex legs negative    Assessment:  (Medical Decision Making)   Submassive PE with adequate BP but severe hypoxemia and high clot burden, evidence of RV systolic dysfunction.         Hospital Problems  Date Reviewed: 10/18/2017          Codes Class Noted POA    * (Principal)Acute pulmonary embolism ([address] ICD-10-CM: I26.99  ICD-9-CM: 415.19  12/6/2017 Yes    On heparin drip. Considering EKOS    Acute respiratory failure [facility]) ICD-10-CM: J96.00  ICD-9-CM: 518.81  12/6/2017 Yes    Severe with notable hypoxia    Systolic CHF, chronic (HCC) (Chronic) ICD-10-CM: I50.22  ICD-9-CM: 428.22, 428.0  10/18/2017 Yes    But without obvious pulmonary edema    Orthostatic hypotension ICD-10-CM: I95.1  ICD-9-CM: 458.0  Unknown Yes    Currently BP is stable    ESRD on hemodialysis (HCC) (Chronic) ICD-10-CM: N18.6, Z99.2  ICD-9-CM: 585.6, V45.11  8/28/2014 Yes        CASSIE (obstructive sleep apnea) (Chronic) ICD-10-CM: W15.62  ICD-9-CM: 327.23  7/16/2014 Yes    Overview Signed 7/16/2014  1:18 AM by Halley Victoria NP     BIPAP             DM (diabetes mellitus) ([facility] (Chronic) ICD-10-CM: E11.9  ICD-9-CM: 250.00  7/16/2014 Yes        Lupus (Chronic) ICD-10-CM: L93.0  ICD-9-CM: 695.4  6/19/2014 Yes        Morbid obesity ([facility] (Chronic) ICD-10-CM: E66.01  ICD-9-CM: 278.01  6/19/2014 Yes              Plan:  (Medical Decision Making)     Complicated patient with ESRD, RV strain on TTE and large PE with persistent severe hypoxemia despite 24h of heparin drip. Discussed possibility of lysis with patient and she is hesitant.    --I'd favor watching 48h on heparin and then strongly considering EKOS if O2 requirements are not declining and BiPAP not weaning. She is currently dependent on BiPAP and can't come off or wean below 80%. --Hold plavix given possible need for EKOS. .  --wound care to right leg. --Spoke with IR. More than 50% of the time documented was spent in face-to-face contact with the patient and in the care of the patient on the floor/unit where the patient is located. 31 minutes spent in acute care of this patient, discussing possible thrombolysis with patient/IR and managing severe hypoxemic respiratory failure.     Rico Spine Kehinde Bliss MD

## 2017-12-07 NOTE — PROGRESS NOTES
Bedside shift change report given to Franki Godinez (oncoming nurse) by Justine Olson RN (offgoing nurse). Report included the following information SBAR, Kardex, MAR, Recent Results and Cardiac Rhythm NSR/BBB.

## 2017-12-07 NOTE — PROGRESS NOTES
Bedside shift change report given to Ashwin Cartwright RN (oncoming nurse) by YEE ROSA (offgoing nurse). Report included the following information SBAR, Kardex, MAR, Recent Results and Cardiac Rhythm NSR, AVB-1st degree, BBB.

## 2017-12-07 NOTE — PROGRESS NOTES
Pt back to room from IR. Pt alert and oriented x4. Lung sounds clear/diminished. RT at bedside and pt placed on BiPAP 16/10, FiO2 100%, O2 Sat 90-92%. HR/BP stable. Right femoral sheath intact w/drsg in place, no bleeding noted. Heparin gtt infusing at 400 units/hr. Alteplase gtt infusing at 1 mg/hr. Dual verification complete w/YEE Youssef. Skin assessment complete and unchanged. Pt repositioned in bed for comfort. Family at bedside.

## 2017-12-07 NOTE — PROGRESS NOTES
[facility] Nephrology        Subjective: ESRD  Feels about the same, still SOB, on BiPAP    Review of Systems -   General ROS: negative for - chills, fatigue or fever  Respiratory ROS: positive for - shortness of breath  Cardiovascular ROS: no chest pain or dyspnea on exertion  Gastrointestinal ROS: no abdominal pain, change in bowel habits, or black or bloody stools  Genito-Urinary ROS: no dysuria, trouble voiding, or hematuria  Neurological ROS: no TIA or stroke symptoms        Objective:    Vitals:    12/07/17 0432 12/07/17 0501 12/07/17 0531 12/07/17 0601   BP: 108/61 108/66 114/60 111/62   Pulse: 77 79 79 79   Resp: 15 19 19 24   Temp:       SpO2: 93% 93% 93% 92%   Weight:       Height:           PE  Gen: in no acute distress  CV: reg rate  Chest: clear  Abd: soft  Ext/Access: av graft in left arm ok       . LAB  Recent Labs      12/07/17   0355 12/06/17 0714  12/06/17 0131   WBC  5.3   --   5.3   HGB  9.0*   --   9.1*   HCT  29.2*   --   28.6*   PLT  145*   --   144*   INR   --   1.0   --      Recent Labs      12/07/17   0355 12/06/17 0715  12/06/17 0131   NA  138   --   137   K  4.2   --   3.9   CL  97*   --   96*   CO2  30   --   27   GLU  115*   --   219*   BUN  27*   --   64*   CREA  4.64*   --   7.45*   CA  9.6   --   10.0   TROIQ   --   0.33*   --    ALB   --    --   3.3   TBILI   --    --   1.1   ALT   --    --   34   SGOT   --    --   39*           Radiology    A/P:   Patient Active Problem List   Diagnosis Code    GERD (gastroesophageal reflux disease) K21.9    Lupus L93.0    Morbid obesity ([facility]) E66.01    Hypothyroidism E03.9    CASSIE (obstructive sleep apnea) G47.33    DM (diabetes mellitus) ([facility]) E11.9    Unable to bear weight R26.89    ESRD on hemodialysis ([facility]) N18.6, Z99.2    Weakness of both lower limbs R29.898    Anemia of chronic disease D63.8    History of stroke Z86.73    Hemodialysis access, AV graft (HCC) Z99.2    Orthostatic hypotension I95.1    Chronic respiratory failure ([facility]) J96.10    Chronic diastolic heart failure ([facility]) I50.32    HTN (hypertension), ESSENTIAL I10    Hyperlipidemia E78.5    Coronary atherosclerosis of native coronary vessel I25.10    Pulmonary edema J81.1    ESRD (end stage renal disease) on dialysis ([facility]) N18.6, N09.2    Systolic CHF, chronic ([facility]) I50.22    Acute pulmonary embolism ([facility]) I26.99    Acute respiratory failure ([facility]) J96.00    Leg wound, right S81.801A       Will hold off on dialysis tomorrow and wait till Sat 12/9/17 as she will be on TPA Tomorrow for thrombolysis.        Reji Cantu MD

## 2017-12-07 NOTE — PROGRESS NOTES
TRANSFER - IN REPORT:    Verbal report received from YEE Taylor on [employer]  being received from IR for routine progression of care      Report consisted of patients Situation, Background, Assessment and   Recommendations(SBAR). Information from the following report(s) SBAR, Kardex, Procedure Summary, MAR, Recent Results and Cardiac Rhythm NSR w/1st deg AVB and BBB was reviewed with the receiving nurse. Opportunity for questions and clarification was provided. Assessment completed upon patients arrival to unit and care assumed.

## 2017-12-07 NOTE — INTERDISCIPLINARY ROUNDS
Interdisciplinary team rounds were held 12/7/2017 with the following team members:Care Management, Nursing, Nurse Practitioner, Nutrition, Palliative Care, Pastoral Care, Pharmacy, Physician, Respiratory Therapy and Clinical Coordinator and the patient and spouse. Plan of care discussed. See clinical pathway and/or care plan for interventions and desired outcomes.

## 2017-12-07 NOTE — PROGRESS NOTES
Family discussion held regarding tPa lysis for PE. All questions answered by Dr. Ping Kumar. Pt would like to proceed w/procedure. MD and IR staff aware.

## 2017-12-07 NOTE — PROGRESS NOTES
Department of Interventional Radiology  [phone]                                 Progress Note  Patient: Jill Mendieta MRN: [id number]  SSN: [id number]    YOB: 1945  Age: 81 y.o. Sex: female      Subjective:   Remains on Bipap, Fio2 80%. No distress. Nervous about need for potential lysis. Able to recline for position changes w/o distress. Mask removed for short periods of time for meds and sips of water. Objective:     Vitals:    12/07/17 0832 12/07/17 0901 12/07/17 0931 12/07/17 1002   BP: 113/62 112/58 118/62 122/62   Pulse: 78 76 76 78   Resp: 15 16 14 15   Temp:       SpO2: 95% 94% 95% 93%   Weight:       Height:          Intake and Output:       Physical Exam:   On Bipap, NAD, no distress  Lab/Data Review:  BMP:   Lab Results   Component Value Date/Time     12/07/2017 03:55 AM    K 4.2 12/07/2017 03:55 AM    CL 97 (L) 12/07/2017 03:55 AM    CO2 30 12/07/2017 03:55 AM    AGAP 11 12/07/2017 03:55 AM     (H) 12/07/2017 03:55 AM    BUN 27 (H) 12/07/2017 03:55 AM    CREA 4.64 (H) 12/07/2017 03:55 AM    GFRAA 12 (L) 12/07/2017 03:55 AM    GFRNA 10 (L) 12/07/2017 03:55 AM     CBC:   Lab Results   Component Value Date/Time    WBC 5.3 12/07/2017 03:55 AM    HGB 9.0 (L) 12/07/2017 03:55 AM    HCT 29.2 (L) 12/07/2017 03:55 AM     (L) 12/07/2017 03:55 AM     COAGS:   Lab Results   Component Value Date/Time    APTT 54.5 (H) 12/07/2017 03:55 AM     Assessment:   PE, high O2 requirements, remains on Bipap. Heparin infusion  Plavix held today. DNR    Plan:   Pt would like to discuss thrombolysis with her .    HD scheduled for tomorrow, but could be delayed until no later than the following morning (12/9) per Dr. Vazquez Maynard By: Yonathan Talley PA-C     December 7, 2017

## 2017-12-07 NOTE — PROCEDURES
Department of Interventional Radiology  [phone]        Interventional Radiology Brief Procedure Note    Patient: Sunday Cordova MRN: [id number]  SSN: [id number]    YOB: 1945  Age: 81 y.o.   Sex: female      Date of Procedure: 12/7/2017    Pre-Procedure Diagnosis: PE    Post-Procedure Diagnosis: SAME    Procedure(s): PA thrombolysis    Brief Description of Procedure: as above, single main PA catheter    Performed By: Mark Portillo PA-C     Assistants: None    Anesthesia:None    Estimated Blood Loss: Less than 10ml    Specimens: None    Implants: catheter    Findings: Likely ASD or VSD    Complications: None    Recommendations: see orders for parameters and monitoring     Follow Up: tomorrow    Signed By: Mark Portillo PA-C     December 7, 2017

## 2017-12-08 ENCOUNTER — APPOINTMENT (OUTPATIENT)
Dept: INTERVENTIONAL RADIOLOGY/VASCULAR | Age: 72
DRG: 175 | End: 2017-12-08
Attending: INTERNAL MEDICINE
Payer: MEDICARE

## 2017-12-08 LAB
ALBUMIN SERPL-MCNC: 2.8 G/DL (ref 3.2–4.6)
ANION GAP SERPL CALC-SCNC: 10 MMOL/L (ref 7–16)
BASOPHILS # BLD: 0 K/UL (ref 0–0.2)
BASOPHILS NFR BLD: 0 % (ref 0–2)
BUN SERPL-MCNC: 37 MG/DL (ref 8–23)
CALCIUM SERPL-MCNC: 9.4 MG/DL (ref 8.3–10.4)
CHLORIDE SERPL-SCNC: 98 MMOL/L (ref 98–107)
CO2 SERPL-SCNC: 27 MMOL/L (ref 21–32)
CREAT SERPL-MCNC: 6.45 MG/DL (ref 0.6–1)
DIFFERENTIAL METHOD BLD: ABNORMAL
EOSINOPHIL # BLD: 0.1 K/UL (ref 0–0.8)
EOSINOPHIL NFR BLD: 1 % (ref 0.5–7.8)
ERYTHROCYTE [DISTWIDTH] IN BLOOD BY AUTOMATED COUNT: 17.6 % (ref 11.9–14.6)
ERYTHROCYTE [DISTWIDTH] IN BLOOD BY AUTOMATED COUNT: 17.6 % (ref 11.9–14.6)
ERYTHROCYTE [DISTWIDTH] IN BLOOD BY AUTOMATED COUNT: 17.7 % (ref 11.9–14.6)
FIBRINOGEN PPP-MCNC: 370 MG/DL (ref 190–501)
FIBRINOGEN PPP-MCNC: 411 MG/DL (ref 190–501)
FIBRINOGEN PPP-MCNC: 433 MG/DL (ref 190–501)
GLUCOSE BLD STRIP.AUTO-MCNC: 117 MG/DL (ref 65–100)
GLUCOSE BLD STRIP.AUTO-MCNC: 120 MG/DL (ref 65–100)
GLUCOSE BLD STRIP.AUTO-MCNC: 132 MG/DL (ref 65–100)
GLUCOSE BLD STRIP.AUTO-MCNC: 138 MG/DL (ref 65–100)
GLUCOSE SERPL-MCNC: 127 MG/DL (ref 65–100)
HCT VFR BLD AUTO: 25.4 % (ref 35.8–46.3)
HCT VFR BLD AUTO: 26 % (ref 35.8–46.3)
HCT VFR BLD AUTO: 27.3 % (ref 35.8–46.3)
HGB BLD-MCNC: 7.8 G/DL (ref 11.7–15.4)
HGB BLD-MCNC: 8.1 G/DL (ref 11.7–15.4)
HGB BLD-MCNC: 8.4 G/DL (ref 11.7–15.4)
IMM GRANULOCYTES # BLD: 0 K/UL (ref 0–0.5)
IMM GRANULOCYTES NFR BLD AUTO: 1 % (ref 0–5)
INR PPP: 1.1
INR PPP: 1.2
INR PPP: 1.2
LYMPHOCYTES # BLD: 0.9 K/UL (ref 0.5–4.6)
LYMPHOCYTES NFR BLD: 19 % (ref 13–44)
MCH RBC QN AUTO: 33.3 PG (ref 26.1–32.9)
MCH RBC QN AUTO: 33.6 PG (ref 26.1–32.9)
MCH RBC QN AUTO: 33.9 PG (ref 26.1–32.9)
MCHC RBC AUTO-ENTMCNC: 30.7 G/DL (ref 31.4–35)
MCHC RBC AUTO-ENTMCNC: 30.8 G/DL (ref 31.4–35)
MCHC RBC AUTO-ENTMCNC: 31.2 G/DL (ref 31.4–35)
MCV RBC AUTO: 108.5 FL (ref 79.6–97.8)
MCV RBC AUTO: 108.8 FL (ref 79.6–97.8)
MCV RBC AUTO: 109.2 FL (ref 79.6–97.8)
MONOCYTES # BLD: 0.5 K/UL (ref 0.1–1.3)
MONOCYTES NFR BLD: 10 % (ref 4–12)
NEUTS SEG # BLD: 3.3 K/UL (ref 1.7–8.2)
NEUTS SEG NFR BLD: 69 % (ref 43–78)
PHOSPHATE SERPL-MCNC: 5 MG/DL (ref 2.3–3.7)
PLATELET # BLD AUTO: 107 K/UL (ref 150–450)
PLATELET # BLD AUTO: 121 K/UL (ref 150–450)
PLATELET # BLD AUTO: 123 K/UL (ref 150–450)
PMV BLD AUTO: 9.1 FL (ref 10.8–14.1)
PMV BLD AUTO: 9.4 FL (ref 10.8–14.1)
PMV BLD AUTO: 9.6 FL (ref 10.8–14.1)
POTASSIUM SERPL-SCNC: 3.8 MMOL/L (ref 3.5–5.1)
PROTHROMBIN TIME: 13.4 SEC (ref 11.5–14.5)
PROTHROMBIN TIME: 14.2 SEC (ref 11.5–14.5)
PROTHROMBIN TIME: 14.6 SEC (ref 11.5–14.5)
RBC # BLD AUTO: 2.34 M/UL (ref 4.05–5.25)
RBC # BLD AUTO: 2.39 M/UL (ref 4.05–5.25)
RBC # BLD AUTO: 2.5 M/UL (ref 4.05–5.25)
SODIUM SERPL-SCNC: 135 MMOL/L (ref 136–145)
WBC # BLD AUTO: 4.7 K/UL (ref 4.3–11.1)
WBC # BLD AUTO: 4.8 K/UL (ref 4.3–11.1)
WBC # BLD AUTO: 5 K/UL (ref 4.3–11.1)

## 2017-12-08 PROCEDURE — 65620000000 HC RM CCU GENERAL

## 2017-12-08 PROCEDURE — 74011636320 HC RX REV CODE- 636/320: Performed by: RADIOLOGY

## 2017-12-08 PROCEDURE — 85610 PROTHROMBIN TIME: CPT | Performed by: INTERNAL MEDICINE

## 2017-12-08 PROCEDURE — 80069 RENAL FUNCTION PANEL: CPT | Performed by: INTERNAL MEDICINE

## 2017-12-08 PROCEDURE — 85025 COMPLETE CBC W/AUTO DIFF WBC: CPT | Performed by: INTERNAL MEDICINE

## 2017-12-08 PROCEDURE — 82962 GLUCOSE BLOOD TEST: CPT

## 2017-12-08 PROCEDURE — 74011000250 HC RX REV CODE- 250: Performed by: RADIOLOGY

## 2017-12-08 PROCEDURE — C1769 GUIDE WIRE: HCPCS

## 2017-12-08 PROCEDURE — 85384 FIBRINOGEN ACTIVITY: CPT | Performed by: INTERNAL MEDICINE

## 2017-12-08 PROCEDURE — 77030013794 HC KT TRNSDUC BLD EDWD -B

## 2017-12-08 PROCEDURE — 85027 COMPLETE CBC AUTOMATED: CPT | Performed by: PHYSICIAN ASSISTANT

## 2017-12-08 PROCEDURE — 85384 FIBRINOGEN ACTIVITY: CPT | Performed by: PHYSICIAN ASSISTANT

## 2017-12-08 PROCEDURE — 74011250636 HC RX REV CODE- 250/636: Performed by: RADIOLOGY

## 2017-12-08 PROCEDURE — 74011250636 HC RX REV CODE- 250/636

## 2017-12-08 PROCEDURE — 74011250636 HC RX REV CODE- 250/636: Performed by: INTERNAL MEDICINE

## 2017-12-08 PROCEDURE — 37214 CESSJ THERAPY CATH REMOVAL: CPT

## 2017-12-08 PROCEDURE — 99233 SBSQ HOSP IP/OBS HIGH 50: CPT | Performed by: INTERNAL MEDICINE

## 2017-12-08 PROCEDURE — 74011250637 HC RX REV CODE- 250/637: Performed by: INTERNAL MEDICINE

## 2017-12-08 PROCEDURE — B31U1ZZ FLUOROSCOPY OF PULMONARY TRUNK USING LOW OSMOLAR CONTRAST: ICD-10-PCS | Performed by: RADIOLOGY

## 2017-12-08 RX ORDER — LIDOCAINE HYDROCHLORIDE 20 MG/ML
20-200 INJECTION, SOLUTION INFILTRATION; PERINEURAL
Status: DISCONTINUED | OUTPATIENT
Start: 2017-12-08 | End: 2017-12-08 | Stop reason: ALTCHOICE

## 2017-12-08 RX ORDER — HEPARIN SODIUM 5000 [USP'U]/100ML
18-36 INJECTION, SOLUTION INTRAVENOUS
Status: DISCONTINUED | OUTPATIENT
Start: 2017-12-08 | End: 2017-12-18

## 2017-12-08 RX ORDER — LIDOCAINE HYDROCHLORIDE 20 MG/ML
15 SOLUTION OROPHARYNGEAL ONCE
Status: DISCONTINUED | OUTPATIENT
Start: 2017-12-08 | End: 2017-12-08

## 2017-12-08 RX ORDER — HEPARIN SODIUM 200 [USP'U]/100ML
1000 INJECTION, SOLUTION INTRAVENOUS
Status: DISCONTINUED | OUTPATIENT
Start: 2017-12-08 | End: 2017-12-08 | Stop reason: ALTCHOICE

## 2017-12-08 RX ADMIN — HEPARIN SODIUM AND DEXTROSE 18 UNITS/KG/HR: 5000; 5 INJECTION INTRAVENOUS at 18:17

## 2017-12-08 RX ADMIN — LEVOTHYROXINE SODIUM 175 MCG: 50 TABLET ORAL at 09:01

## 2017-12-08 RX ADMIN — FLUDROCORTISONE ACETATE 100 MCG: 0.1 TABLET ORAL at 21:21

## 2017-12-08 RX ADMIN — Medication: at 21:22

## 2017-12-08 RX ADMIN — MIDODRINE HYDROCHLORIDE 5 MG: 5 TABLET ORAL at 12:13

## 2017-12-08 RX ADMIN — Medication 10 ML: at 21:23

## 2017-12-08 RX ADMIN — HEPARIN SODIUM 2000 UNITS: 200 INJECTION, SOLUTION INTRAVENOUS at 14:35

## 2017-12-08 RX ADMIN — ACETAMINOPHEN 650 MG: 325 TABLET ORAL at 09:01

## 2017-12-08 RX ADMIN — IOPAMIDOL 45 ML: 612 INJECTION, SOLUTION INTRAVENOUS at 14:49

## 2017-12-08 RX ADMIN — MIDODRINE HYDROCHLORIDE 5 MG: 5 TABLET ORAL at 17:30

## 2017-12-08 RX ADMIN — Medication: at 09:02

## 2017-12-08 RX ADMIN — Medication 10 ML: at 13:16

## 2017-12-08 RX ADMIN — HEPARIN SODIUM 2000 UNITS: 200 INJECTION, SOLUTION INTRAVENOUS at 14:40

## 2017-12-08 RX ADMIN — MIDODRINE HYDROCHLORIDE 5 MG: 5 TABLET ORAL at 09:01

## 2017-12-08 RX ADMIN — Medication 10 ML: at 05:42

## 2017-12-08 RX ADMIN — LIDOCAINE HYDROCHLORIDE 200 MG: 20 INJECTION, SOLUTION INFILTRATION; PERINEURAL at 14:29

## 2017-12-08 RX ADMIN — HEPARIN SODIUM 2000 UNITS: 200 INJECTION, SOLUTION INTRAVENOUS at 14:30

## 2017-12-08 RX ADMIN — LIDOCAINE HYDROCHLORIDE 100 MG: 20 INJECTION, SOLUTION INFILTRATION; PERINEURAL at 14:38

## 2017-12-08 RX ADMIN — ALTEPLASE 1 MG/HR: KIT at 01:35

## 2017-12-08 RX ADMIN — ALTEPLASE 1 MG/HR: KIT at 12:15

## 2017-12-08 RX ADMIN — PANTOPRAZOLE SODIUM 40 MG: 40 TABLET, DELAYED RELEASE ORAL at 09:01

## 2017-12-08 NOTE — PROGRESS NOTES
Bedside verbal report received from Mo Westfall, [facility]. Heparin gtt rate verified. R groin site CDI with no signs of bleeding or hematoma. Dual skin assessment completed and pt repositioned for comfort.

## 2017-12-08 NOTE — PROGRESS NOTES
Pt sheath site with new drainage about a quarter sized on 4X4 gauze. Drainage marked. Will continue to monitor. Full assessment as charted. Pt VSS stable. Pt alert and oriented. Pt denies pain. Pt states \"she is comfortable. \"

## 2017-12-08 NOTE — INTERDISCIPLINARY ROUNDS
Interdisciplinary team rounds were held 12/8/2017 with the following team members:Care Management, Nursing, Nurse Practitioner, Nutrition, Palliative Care, Pastoral Care, Pharmacy, Physical Therapy, Physician, Respiratory Therapy and Clinical Coordinator and the patient. Plan of care discussed. See clinical pathway and/or care plan for interventions and desired outcomes.

## 2017-12-08 NOTE — PROCEDURES
Interventional Radiology Brief Procedure Note    Patient: Juanito Randhawa MRN: [id number]  SSN: [id number]    YOB: 1945  Age: 81 y.o. Sex: female      Date of Procedure: 12/8/2017    Pre-Procedure Diagnosis: Pulmonary embolus. Post-Procedure Diagnosis: SAME    Procedure(s): Pulmonary angiogram and pressures. Brief Description of Procedure: same     Performed By: Kinza Cuba MD     Assistants: None    Anesthesia: Lidocaine    Estimated Blood Loss: None    Specimens: None    Implants: None  P  Findings: Moderate decrease in clot burden. Main PA 53/25 mean 35. Only slightly decreased since yesterday. Complications: None    Recommendations: Resume anticoagulation after 1700 today. Follow Up: prn.      Signed By: Kinza Cuba MD     December 8, 2017

## 2017-12-08 NOTE — PROGRESS NOTES
Patient is calm with loving family at the bedside. Encouraged with presence and scripture  Prayer.     Thomasina Saint, staff [email or web address], [address] [facility]  /   [email or web address]

## 2017-12-08 NOTE — PROGRESS NOTES
TRANSFER - OUT REPORT:    Verbal report given to YEE Gagnon (name) on Radha Ruiz  being transferred to CCU (unit) for routine progression of care       Report consisted of patients Situation, Background, Assessment and   Recommendations(SBAR). Information from the following report(s) Procedure Summary, Intake/Output and MAR was reviewed with the receiving nurse. Lines:   Peripheral IV 12/06/17 Left Other(comment) (Active)   Site Assessment Clean, dry, & intact 12/8/2017 12:01 PM   Phlebitis Assessment 0 12/8/2017 12:01 PM   Infiltration Assessment 0 12/8/2017 12:01 PM   Dressing Status Clean, dry, & intact 12/8/2017 12:01 PM   Dressing Type Transparent;Tape 12/8/2017 12:01 PM   Hub Color/Line Status Capped;Flushed;Patent 12/8/2017 12:01 PM   Alcohol Cap Used No 12/8/2017 12:01 PM       Peripheral IV 12/06/17 Right Antecubital (Active)   Site Assessment Clean, dry, & intact 12/8/2017 12:01 PM   Phlebitis Assessment 0 12/8/2017 12:01 PM   Infiltration Assessment 0 12/8/2017 12:01 PM   Dressing Status Clean, dry, & intact 12/8/2017 12:01 PM   Dressing Type Transparent;Tape 12/8/2017 12:01 PM   Hub Color/Line Status Capped 12/8/2017 12:01 PM   Alcohol Cap Used No 12/8/2017 12:01 PM       Peripheral IV 12/07/17 Right; Inner Forearm (Active)   Site Assessment Clean, dry, & intact 12/8/2017 12:01 PM   Phlebitis Assessment 0 12/8/2017 12:01 PM   Infiltration Assessment 0 12/8/2017 12:01 PM   Dressing Status Clean, dry, & intact 12/8/2017 12:01 PM   Dressing Type Transparent;Tape 12/8/2017 12:01 PM   Hub Color/Line Status Capped 12/8/2017 12:01 PM   Alcohol Cap Used No 12/8/2017 12:01 PM        Opportunity for questions and clarification was provided.       Patient transported with:   Monitor  O2 @ 6 liters  Registered Nurse

## 2017-12-08 NOTE — PROGRESS NOTES
Pt requesting to be placed back on BiPAP from [facility]. Pt denies SOB. O2 sat 100% on 6L. Pt does sound coarse. Asked pt to cough. Pt with weak cough. RT called to bedside. Pt placed back on BiPAP.

## 2017-12-08 NOTE — PROGRESS NOTES
[facility] Progress Note    Follow-Up on: ESRD    ROS:  Gen - no fever, no chills, appetite unchanged  CV - no chest pain, no palpitation  Lung - shortness of breath better, no cough  Abd - no tenderness, no nausea/vomiting, no diarrhea  Ext - no edema    Exam:  Vitals:    12/08/17 0601 12/08/17 0702 12/08/17 0731 12/08/17 0756   BP: 134/63 140/65 137/65    Pulse: 74 73 72    Resp: 17 16 17    Temp:   100.2 °F (37.9 °C)    SpO2: 100% 100% 100% 100%   Weight:       Height:             Intake/Output Summary (Last 24 hours) at 12/08/17 0858  Last data filed at 12/08/17 0600   Gross per 24 hour   Intake           1398.1 ml   Output                0 ml   Net           1398.1 ml       Wt Readings from Last 3 Encounters:   12/08/17 92.9 kg (204 lb 12.9 oz)   09/22/17 94.8 kg (209 lb)   08/17/17 95.3 kg (210 lb)       GEN - in no distress, on Bipap  CV - regular, no murmur, no rub  Lung - clear bilaterally  Abd - soft, nontender  Ext - no edema    Recent Labs      12/08/17   0541  12/08/17   0045  12/07/17   1940   WBC  4.8  4.7  5.0   HGB  8.4*  8.1*  8.1*   HCT  27.3*  26.0*  26.2*   PLT  121*  123*  125*   INR  1.2  1.1  1.1        Recent Labs      12/08/17   0541  12/07/17   0355  12/06/17   0131   NA  135*  138  137   K  3.8  4.2  3.9   CL  98  97*  96*   CO2  27  30  27   BUN  37*  27*  64*   CREA  6.45*  4.64*  7.45*   CA  9.4  9.6  10.0   GLU  127*  115*  219*   PHOS  5.0*   --    --        Assessment / Plan:  Principal Problem:    Acute pulmonary embolism (HCC) (12/6/2017)    Active Problems:    Lupus (6/19/2014)      Morbid obesity (HCC) (6/19/2014)      CASSIE (obstructive sleep apnea) (7/16/2014)      Overview: BIPAP      DM (diabetes mellitus) ([facility] [address] (7/16/2014)      ESRD on hemodialysis ([address] (8/28/2014)      Orthostatic hypotension ()      Systolic CHF, chronic ([address] (10/18/2017)      Acute respiratory failure ([address] (12/6/2017)      Leg wound, right (12/7/2017)      1.  ESRD - HD held today while getting thombolytics  - HD tomorrow  2. Pulmonary embolism  3. H/o CHF  4.  Leg wound

## 2017-12-08 NOTE — PROGRESS NOTES
Bedside report received from [facility]. Pt alert and oriented. Pt on BiPAP at 60%. Breath sounds diminished. Pt flat with R sheath. No bleeding or hematoma noted. Dressing c/d/i. Gtts verified. Pulses palpable in all extremities. Pulses verified in R leg with doppler. S1S2. Active bowel sounds. Abdomen soft and intact. Nonblanchable area to sacrum. Allevyn in place. Leg wound with dressing applied. Dressing clean, dry, intact. Will continue to monitor. Full assessment as charted.

## 2017-12-08 NOTE — PROGRESS NOTES
TRANSFER - IN REPORT:    Verbal report received from YEE Youssef(name) on Latonya Valdivia  being received from IR(unit) for routine progression of care      Report consisted of patients Situation, Background, Assessment and   Recommendations(SBAR). Information from the following report(s) SBAR, Intake/Output, MAR and Recent Results was reviewed with the receiving nurse. Opportunity for questions and clarification was provided. Assessment completed upon patients arrival to unit and care assumed.

## 2017-12-08 NOTE — PROGRESS NOTES
TRANSFER - OUT REPORT:    Verbal report given to YEE Deleon(name) on [employer]  being transferred to IR(unit) for ordered procedure       Report consisted of patients Situation, Background, Assessment and   Recommendations(SBAR). Information from the following report(s) SBAR, Kardex, Procedure Summary, Intake/Output, MAR, Recent Results and Cardiac Rhythm NSR was reviewed with the receiving nurse. Lines:   Peripheral IV 12/06/17 Left Other(comment) (Active)   Site Assessment Clean, dry, & intact 12/8/2017 12:01 PM   Phlebitis Assessment 0 12/8/2017 12:01 PM   Infiltration Assessment 0 12/8/2017 12:01 PM   Dressing Status Clean, dry, & intact 12/8/2017 12:01 PM   Dressing Type Transparent;Tape 12/8/2017 12:01 PM   Hub Color/Line Status Capped;Flushed;Patent 12/8/2017 12:01 PM   Alcohol Cap Used No 12/8/2017 12:01 PM       Peripheral IV 12/06/17 Right Antecubital (Active)   Site Assessment Clean, dry, & intact 12/8/2017 12:01 PM   Phlebitis Assessment 0 12/8/2017 12:01 PM   Infiltration Assessment 0 12/8/2017 12:01 PM   Dressing Status Clean, dry, & intact 12/8/2017 12:01 PM   Dressing Type Transparent;Tape 12/8/2017 12:01 PM   Hub Color/Line Status Capped 12/8/2017 12:01 PM   Alcohol Cap Used No 12/8/2017 12:01 PM       Peripheral IV 12/07/17 Right; Inner Forearm (Active)   Site Assessment Clean, dry, & intact 12/8/2017 12:01 PM   Phlebitis Assessment 0 12/8/2017 12:01 PM   Infiltration Assessment 0 12/8/2017 12:01 PM   Dressing Status Clean, dry, & intact 12/8/2017 12:01 PM   Dressing Type Transparent;Tape 12/8/2017 12:01 PM   Hub Color/Line Status Capped 12/8/2017 12:01 PM   Alcohol Cap Used No 12/8/2017 12:01 PM        Opportunity for questions and clarification was provided.       Patient transported with:   Monitor  O2 @ 8 liters  Registered Nurse  [facility]

## 2017-12-08 NOTE — PROGRESS NOTES
PEr  who did EKOS there is possible open PFO and therefore will check bubble echo.   Varghese Roberson MD

## 2017-12-08 NOTE — PROGRESS NOTES
Critical Care Daily Progress Note: 12/8/2017    Sunday Cordova   Admission Date: 12/6/2017         The patient's chart is reviewed and the patient is discussed with the staff. The patient's chart is reviewed and the patient is discussed with the staff. Sunday Cordova is a 69yo F who was admitted on 12/6 with ESRD, CHF 40%, DM, orhtostatic hypotension who presented from rehab with right leg wound and severe hypoxia and was found to have large bilateral pulmonary emboli. Subjective:     S/p EKOS  Off BIPAP      Current Facility-Administered Medications   Medication Dose Route Frequency    alteplase (CATHFLO) 10 mg in 0.9% sodium chloride 500 mL infusion  1 mg/hr IntraCATHeter- ARTerial CONTINUOUS    heparin 25,000 units in dextrose 500 mL infusion  400 Units/hr IntraVENous CONTINUOUS    sodium chloride (NS) flush 5-10 mL  5-10 mL IntraVENous Q8H    sodium chloride (NS) flush 5-10 mL  5-10 mL IntraVENous PRN    fludrocortisone (FLORINEF) tablet 100 mcg  100 mcg Oral QHS    levothyroxine (SYNTHROID) tablet 175 mcg  175 mcg Oral ACB    pantoprazole (PROTONIX) tablet 40 mg  40 mg Oral DAILY    insulin regular (NOVOLIN R, HUMULIN R) injection   SubCUTAneous Q6H    acetaminophen (TYLENOL) tablet 650 mg  650 mg Oral Q4H PRN    zinc oxide-cod liver oil (DESITIN) 40 % paste   Topical BID    midodrine (PROAMITINE) tablet 5 mg  5 mg Oral TID WITH MEALS       Review of Systems   Unobtainable due to patient status.     Objective:     Vitals:    12/08/17 1028 12/08/17 1031 12/08/17 1102 12/08/17 1105   BP:  114/56 111/54    Pulse:  65 66    Resp:  14 12    Temp:       SpO2: 96% 100% 92% 96%   Weight:       Height:           Intake and Output:   12/06 1901 - 12/08 0700  In: 1398.1 [I.V.:1398.1]  Out: -        Physical Exam:          Constitutional:  the patient is well developed and in no acute distress  EENMT:  Sclera clear, pupils equal, oral mucosa moist  Respiratory: clear  Cardiovascular: RRR without M,G,R  Gastrointestinal: soft and non-tender; with positive bowel sounds. Musculoskeletal: warm without cyanosis. There is no lower leg edema.   Skin:  no jaundice or rashes, no wounds   Neurologic: no gross neuro deficits     Psychiatric:  alert and oriented x 3    LINES:  Per iv lines    DRIPS:   Heparin gtt    CXR:       LAB  Recent Labs      12/08/17   0539  12/08/17   0044  12/07/17   1754  12/07/17   1238  12/07/17   0547   GLUCPOC  132*  117*  100  138*  127*      Recent Labs      12/08/17   0541  12/08/17   0045  12/07/17   1940   WBC  4.8  4.7  5.0   HGB  8.4*  8.1*  8.1*   HCT  27.3*  26.0*  26.2*   PLT  121*  123*  125*   INR  1.2  1.1  1.1     Recent Labs      12/08/17   0541  12/07/17   0355  12/06/17   0715  12/06/17   0131   NA  135*  138   --   137   K  3.8  4.2   --   3.9   CL  98  97*   --   96*   CO2  27  30   --   27   GLU  127*  115*   --   219*   BUN  37*  27*   --   64*   CREA  6.45*  4.64*   --   7.45*   PHOS  5.0*   --    --    --    CA  9.4  9.6   --   10.0   TROIQ   --    --   0.33*   --    ALB  2.8*   --    --   3.3   TBILI   --    --    --   1.1   ALT   --    --    --   34   SGOT   --    --    --   39*     Recent Labs      12/07/17   0925  12/06/17   1012  12/06/17   0420   PH  7.43  7.35  7.34*   PCO2  46*  52*  53*   PO2  61*  67*  58*   HCO3  30*  28*  28*       Assessment:  (Medical Decision Making)     Hospital Problems  Date Reviewed: 10/18/2017          Codes Class Noted POA    Leg wound, right ICD-10-CM: G36.511X  ICD-9-CM: 891.0  12/7/2017 Unknown        * (Principal)Acute pulmonary embolism (HCC)  on Heparin gtt , s/p EKOS ICD-10-CM: I26.99  ICD-9-CM: 415.19  12/6/2017 Yes        Acute respiratory failure (HCC)   off BIPAP ICD-10-CM: J96.00  ICD-9-CM: 518.81  12/6/2017 Yes        Systolic CHF, chronic (HCC) (Chronic) ICD-10-CM: I50.22  ICD-9-CM: 428.22, 428.0  10/18/2017 Yes        Orthostatic hypotension ICD-10-CM: I95.1  ICD-9-CM: 458.0  Unknown Yes        ESRD on hemodialysis ([facility] (Chronic) ICD-10-CM: N18.6, Z99.2  ICD-9-CM: 585.6, V45.11  8/28/2014 Yes        CASSIE (obstructive sleep apnea) (Chronic) ICD-10-CM: G47.33  ICD-9-CM: 327.23  7/16/2014 Yes    Overview Signed 7/16/2014  1:18 AM by Beth Huang NP     BIPLAUREN             DM (diabetes mellitus) ([facility] (Chronic) ICD-10-CM: E11.9  ICD-9-CM: 250.00  7/16/2014 Yes        Lupus (Chronic) ICD-10-CM: L93.0  ICD-9-CM: 695.4  6/19/2014 Yes        Morbid obesity ([facility] (Chronic) ICD-10-CM: E66.01  ICD-9-CM: 278.01  6/19/2014 Yes              Plan:  (Medical Decision Making)     -- on Heparin gtt  - going back to IR     More than 50% of the time documented was spent in face-to-face contact with the patient and in the care of the patient on the floor/unit where the patient is located.     Viri Kennedy MD

## 2017-12-09 LAB
ANION GAP SERPL CALC-SCNC: 13 MMOL/L (ref 7–16)
APTT PPP: 116.4 SEC (ref 23.2–35.3)
APTT PPP: 119.6 SEC (ref 23.2–35.3)
APTT PPP: 45.8 SEC (ref 23.2–35.3)
APTT PPP: 84.3 SEC (ref 23.2–35.3)
APTT PPP: 93.7 SEC (ref 23.2–35.3)
BUN SERPL-MCNC: 47 MG/DL (ref 8–23)
CALCIUM SERPL-MCNC: 8.7 MG/DL (ref 8.3–10.4)
CHLORIDE SERPL-SCNC: 96 MMOL/L (ref 98–107)
CO2 SERPL-SCNC: 26 MMOL/L (ref 21–32)
CREAT SERPL-MCNC: 7.92 MG/DL (ref 0.6–1)
GLUCOSE BLD STRIP.AUTO-MCNC: 129 MG/DL (ref 65–100)
GLUCOSE BLD STRIP.AUTO-MCNC: 134 MG/DL (ref 65–100)
GLUCOSE BLD STRIP.AUTO-MCNC: 134 MG/DL (ref 65–100)
GLUCOSE BLD STRIP.AUTO-MCNC: 152 MG/DL (ref 65–100)
GLUCOSE SERPL-MCNC: 115 MG/DL (ref 65–100)
POTASSIUM SERPL-SCNC: 3.8 MMOL/L (ref 3.5–5.1)
SODIUM SERPL-SCNC: 135 MMOL/L (ref 136–145)

## 2017-12-09 PROCEDURE — 36415 COLL VENOUS BLD VENIPUNCTURE: CPT | Performed by: INTERNAL MEDICINE

## 2017-12-09 PROCEDURE — 99232 SBSQ HOSP IP/OBS MODERATE 35: CPT | Performed by: INTERNAL MEDICINE

## 2017-12-09 PROCEDURE — 93308 TTE F-UP OR LMTD: CPT

## 2017-12-09 PROCEDURE — 80048 BASIC METABOLIC PNL TOTAL CA: CPT | Performed by: INTERNAL MEDICINE

## 2017-12-09 PROCEDURE — 85730 THROMBOPLASTIN TIME PARTIAL: CPT | Performed by: INTERNAL MEDICINE

## 2017-12-09 PROCEDURE — 94760 N-INVAS EAR/PLS OXIMETRY 1: CPT

## 2017-12-09 PROCEDURE — 90935 HEMODIALYSIS ONE EVALUATION: CPT

## 2017-12-09 PROCEDURE — 65270000029 HC RM PRIVATE

## 2017-12-09 PROCEDURE — 94660 CPAP INITIATION&MGMT: CPT

## 2017-12-09 PROCEDURE — 74011250637 HC RX REV CODE- 250/637: Performed by: INTERNAL MEDICINE

## 2017-12-09 PROCEDURE — 74011250636 HC RX REV CODE- 250/636: Performed by: INTERNAL MEDICINE

## 2017-12-09 PROCEDURE — 77010033678 HC OXYGEN DAILY

## 2017-12-09 PROCEDURE — 77030027688 HC DRSG MEPILEX 16-48IN NO BORD MOLN -A

## 2017-12-09 RX ADMIN — PANTOPRAZOLE SODIUM 40 MG: 40 TABLET, DELAYED RELEASE ORAL at 08:50

## 2017-12-09 RX ADMIN — Medication: at 08:51

## 2017-12-09 RX ADMIN — Medication 10 ML: at 21:47

## 2017-12-09 RX ADMIN — Medication: at 21:50

## 2017-12-09 RX ADMIN — HEPARIN SODIUM AND DEXTROSE 18 UNITS/KG/HR: 5000; 5 INJECTION INTRAVENOUS at 02:45

## 2017-12-09 RX ADMIN — Medication 10 ML: at 15:00

## 2017-12-09 RX ADMIN — FLUDROCORTISONE ACETATE 100 MCG: 0.1 TABLET ORAL at 21:47

## 2017-12-09 RX ADMIN — HEPARIN SODIUM AND DEXTROSE 18 UNITS/KG/HR: 5000; 5 INJECTION INTRAVENOUS at 17:43

## 2017-12-09 RX ADMIN — Medication 10 ML: at 06:00

## 2017-12-09 RX ADMIN — LEVOTHYROXINE SODIUM 175 MCG: 50 TABLET ORAL at 08:50

## 2017-12-09 NOTE — PROGRESS NOTES
TRANSFER - IN REPORT:    Verbal report received from Wili Xavier RN(name) on [employer]  being received from 3308(unit) for routine progression of care      Report consisted of patients Situation, Background, Assessment and   Recommendations(SBAR). Information from the following report(s) SBAR, Kardex, Intake/Output, MAR and Recent Results was reviewed with the receiving nurse. Opportunity for questions and clarification was provided. Assessment completed upon patients arrival to unit and care assumed.

## 2017-12-09 NOTE — PROGRESS NOTES
TRANSFER - OUT REPORT:    Verbal report given to Jaye Crane RN(name) on [employer]  being transferred to CVICU(unit) for routine progression of care       Report consisted of patients Situation, Background, Assessment and   Recommendations(SBAR). Information from the following report(s) SBAR, Kardex, ED Summary, Intake/Output, MAR, Recent Results, Med Rec Status and Cardiac Rhythm NSR, BBB was reviewed with the receiving nurse. Lines:   Peripheral IV 12/06/17 Left Other(comment) (Active)   Site Assessment Clean, dry, & intact 12/9/2017  4:16 AM   Phlebitis Assessment 0 12/8/2017  7:00 PM   Infiltration Assessment 0 12/8/2017  7:00 PM   Dressing Status Clean, dry, & intact 12/8/2017  7:00 PM   Dressing Type Transparent;Tape 12/8/2017  7:00 PM   Hub Color/Line Status Flushed;Patent 12/9/2017  4:16 AM   Alcohol Cap Used No 12/8/2017  7:00 PM       Peripheral IV 12/06/17 Right Antecubital (Active)   Site Assessment Clean, dry, & intact 12/9/2017  4:16 AM   Phlebitis Assessment 0 12/8/2017  7:00 PM   Infiltration Assessment 0 12/8/2017  7:00 PM   Dressing Status Clean, dry, & intact 12/8/2017  7:00 PM   Dressing Type Transparent;Tape 12/8/2017  7:00 PM   Hub Color/Line Status Flushed;Patent 12/9/2017  4:16 AM   Alcohol Cap Used No 12/8/2017  7:00 PM       Peripheral IV 12/07/17 Right; Inner Forearm (Active)   Site Assessment Clean, dry, & intact 12/9/2017  4:16 AM   Phlebitis Assessment 0 12/8/2017  7:00 PM   Infiltration Assessment 0 12/8/2017  7:00 PM   Dressing Status Clean, dry, & intact 12/8/2017  7:00 PM   Dressing Type Transparent;Tape 12/8/2017  7:00 PM   Hub Color/Line Status Flushed;Patent 12/9/2017  4:16 AM   Alcohol Cap Used No 12/8/2017  7:00 PM        Opportunity for questions and clarification was provided.       Patient transported with:   Registered Nurse

## 2017-12-09 NOTE — PROGRESS NOTES
Arrives to rm 822, via bed pt drowsy, speaks at a whisper, completed skin assessment with noted R groin tegaderm covering soft 4x4 packing, large area of bruising on lower abdomen and R side of pelvic area, DDI RLE, allevyn to coccyx area, skin intact with not noted breakdown,2L NC, Heparin infusing per protocal, no active bleeding, MIREILLE with covering, T/B+ poor tissue perfusion AEB thin, edematous integumentary system, encouraged to call for assist, HOB elevated 30 degrees

## 2017-12-09 NOTE — PROGRESS NOTES
Critical Care Daily Progress Note: 12/9/2017    Denver Sigala   Admission Date: 12/6/2017         The patient's chart is reviewed and the patient is discussed with the staff. The patient's chart is reviewed and the patient is discussed with the staff. Denver Sigala is a 71yo F who was admitted on 12/6 with ESRD, CHF 40%, DM, orhtostatic hypotension who presented from rehab with right leg wound and severe hypoxia and was found to have large bilateral pulmonary emboli. EKOS performed 12/8 with possible PFO noted. Subjective:     Moved to CVICU from CCU. EKOS yesterday. ? PFO noted and bubble echo pending. On BIPAP this AM mainly for underlying CASSIE. HD planned for today since missed yesterday.        Current Facility-Administered Medications   Medication Dose Route Frequency    heparin 25,000 units in dextrose 500 mL infusion  18-36 Units/kg/hr IntraVENous TITRATE    sodium chloride (NS) flush 5-10 mL  5-10 mL IntraVENous Q8H    sodium chloride (NS) flush 5-10 mL  5-10 mL IntraVENous PRN    fludrocortisone (FLORINEF) tablet 100 mcg  100 mcg Oral QHS    levothyroxine (SYNTHROID) tablet 175 mcg  175 mcg Oral ACB    pantoprazole (PROTONIX) tablet 40 mg  40 mg Oral DAILY    insulin regular (NOVOLIN R, HUMULIN R) injection   SubCUTAneous Q6H    acetaminophen (TYLENOL) tablet 650 mg  650 mg Oral Q4H PRN    zinc oxide-cod liver oil (DESITIN) 40 % paste   Topical BID    midodrine (PROAMITINE) tablet 5 mg  5 mg Oral TID WITH MEALS       Review of Systems      Constitutional: negative for fever, chills, sweats  Cardiac: negative for chest pain, palpitations, syncope, edema  GI: negative for dysphagia, reflux, vomiting, diarrhea, abdominal pain, or melena  Neuro: negative for focal weakness, numbness, headache      Objective:     Vitals:    12/09/17 0501 12/09/17 0549 12/09/17 0601 12/09/17 0602   BP: 146/65 139/65 131/57    Pulse: 68 74 70    Resp: 14 22 17    Temp:  99.5 °F (37.5 °C) SpO2: 95% 92% 99%    Weight:    211 lb 13.8 oz (96.1 kg)   Height:           Intake and Output:   12/07 1901 - 12/09 0700  In: 1240.7 [P.O.:100; I.V.:1140.7]  Out: -        Physical Exam:          Constitutional:  the patient is well developed and in no acute distress  EENMT:  Sclera clear, pupils equal, oral mucosa moist  Respiratory: decreased BS  Cardiovascular:  RRR without M,G,R  Gastrointestinal: soft and non-tender; with positive bowel sounds. Musculoskeletal: warm without cyanosis. There is no lower leg edema. Skin:  no jaundice or rashes, no wounds   Neurologic: no gross neuro deficits     Psychiatric:  lethargic    LINES:  Per iv lines     DRIPS:   Heparin gtt    CXR:       LAB  Recent Labs      12/09/17   0608  12/08/17   2351  12/08/17   1732  12/08/17   1203  12/08/17   0539   GLUCPOC  134*  134*  120*  138*  132*      Recent Labs      12/08/17   1204  12/08/17   0541  12/08/17   0045   WBC  5.0  4.8  4.7   HGB  7.8*  8.4*  8.1*   HCT  25.4*  27.3*  26.0*   PLT  107*  121*  123*   INR  1.2  1.2  1.1     Recent Labs      12/09/17   0408  12/08/17   0541  12/07/17   0355   NA  135*  135*  138   K  3.8  3.8  4.2   CL  96*  98  97*   CO2  26  27  30   GLU  115*  127*  115*   BUN  47*  37*  27*   CREA  7.92*  6.45*  4.64*   PHOS   --   5.0*   --    CA  8.7  9.4  9.6   ALB   --   2.8*   --      Recent Labs      12/07/17   0925  12/06/17   1012   PH  7.43  7.35   PCO2  46*  52*   PO2  61*  67*   HCO3  30*  28*       Assessment:  (Medical Decision Making)     Hospital Problems  Date Reviewed: 10/18/2017          Codes Class Noted POA    Leg wound, right ICD-10-CM: S16.878B  ICD-9-CM: 891.0  12/7/2017 Unknown        * (Principal)Acute pulmonary embolism (HCC)  on Heparin gtt , s/p EKOS ICD-10-CM: I26.99  ICD-9-CM: 415.19  12/6/2017 Yes        Acute respiratory failure ([address]    ICD-10-CM: J96.00  ICD-9-CM: 518.81  12/6/2017 Yes    Wean to NC as tolerated.     Systolic CHF, chronic (HCC) (Chronic) ICD-10-CM: I50.22  ICD-9-CM: 428.22, 428.0  10/18/2017 Yes        Orthostatic hypotension ICD-10-CM: I95.1  ICD-9-CM: 458.0  Unknown Yes        ESRD on hemodialysis (HCC) (Chronic) ICD-10-CM: N18.6, Z99.2  ICD-9-CM: 585.6, V45.11  8/28/2014 Yes    Needs HD today    CASSIE (obstructive sleep apnea) (Chronic) ICD-10-CM: G88.01  ICD-9-CM: 327.23  7/16/2014 Yes    Overview Signed 7/16/2014  1:18 AM by Yary Sandhu NP     BIPAP         Remains on BIPAP this AM as preference. Can wean to NC    DM (diabetes mellitus) ([facility] [address] (Chronic) ICD-10-CM: E11.9  ICD-9-CM: 250.00  7/16/2014 Yes    BS controlled    Lupus (Chronic) ICD-10-CM: L93.0  ICD-9-CM: 695.4  6/19/2014 Yes        Morbid obesity ([facility] [address] (Chronic) ICD-10-CM: E66.01  ICD-9-CM: 278.01  6/19/2014 Yes    Bowler          Plan:  (Medical Decision Making)     Continue heparin drip. Warfarin once need for procedures completed. Bubble echo today. HD today. BIPAP with sleep. Wean oxygen as tolerated. Pt is DNR. More than 50% of the time documented was spent in face-to-face contact with the patient and in the care of the patient on the floor/unit where the patient is located.     Tyra Vidal MD

## 2017-12-09 NOTE — DIALYSIS
Hemodialysis begun via left arm graft without problems. Machine settings per orders. Will continue throughout treatment.

## 2017-12-09 NOTE — PROGRESS NOTES
Skin assessment completed on admission to CVICU. RLE with dressing C/D/I. Dressing removed to visualize wound. Superficial wound noted. Dressing reapplied. DTI noted to sacral/buttocks area. Small pinhead size hole noted at top of gluteal cleft. Allevyn in placed and zinc protective ointment applied over area.

## 2017-12-09 NOTE — DIALYSIS
Hemodialysis ended without problems. B/P- 114/61, HR- 78, 2.0 kg's removed. Patient tolerated treatment well and remains in CCU. Reported to primary RN.

## 2017-12-09 NOTE — PROGRESS NOTES
[facility] Progress Note    Follow-Up on: ESRD    ROS:  Gen - no fever, no chills, appetite unchanged  CV - no chest pain, no palpitation  Lung - shortness of breath better, no cough  Abd - no tenderness, no nausea/vomiting, no diarrhea  Ext - no edema    Exam:  Vitals:    12/09/17 1112 12/09/17 1117 12/09/17 1131 12/09/17 1146   BP: 132/63 140/60 142/65 141/60   Pulse: 69 70 70 69   Resp:  16 17   Temp:  98.3 °F (36.8 °C)     SpO2:  93%  96%   Weight:       Height:             Intake/Output Summary (Last 24 hours) at 12/09/17 1153  Last data filed at 12/08/17 1759   Gross per 24 hour   Intake            559.3 ml   Output                0 ml   Net            559.3 ml       Wt Readings from Last 3 Encounters:   12/09/17 96.1 kg (211 lb 13.8 oz)   09/22/17 94.8 kg (209 lb)   08/17/17 95.3 kg (210 lb)       GEN - in no distress, on Bipap  CV - regular, no murmur, no rub  Lung - clear bilaterally  Abd - soft, nontender  Ext - no edema    Recent Labs      12/08/17   1204  12/08/17   0541  12/08/17   0045   WBC  5.0  4.8  4.7   HGB  7.8*  8.4*  8.1*   HCT  25.4*  27.3*  26.0*   PLT  107*  121*  123*   INR  1.2  1.2  1.1        Recent Labs      12/09/17   0408  12/08/17   0541  12/07/17   0355   NA  135*  135*  138   K  3.8  3.8  4.2   CL  96*  98  97*   CO2  26  27  30   BUN  47*  37*  27*   CREA  7.92*  6.45*  4.64*   CA  8.7  9.4  9.6   GLU  115*  127*  115*   PHOS   --   5.0*   --        Assessment / Plan:  Principal Problem:    Acute pulmonary embolism ([address] (12/6/2017)    Active Problems:    Lupus (6/19/2014)      Morbid obesity (HCC) (6/19/2014)      CASSIE (obstructive sleep apnea) (7/16/2014)      Overview: BIPAP      DM (diabetes mellitus) ([facility] [address] (7/16/2014)      ESRD on hemodialysis ([address] (8/28/2014)      Orthostatic hypotension ()      Systolic CHF, chronic ([facility] [address] (10/18/2017)      Acute respiratory failure ([facility] [address] (12/6/2017)      Leg wound, right (12/7/2017)      1.  ESRD - Seen on HD for clearance and UF  2. Pulmonary embolism - s/p thrombolysis - moderate decrease in clot burden  3. H/o CHF  4.  Leg wound

## 2017-12-10 ENCOUNTER — APPOINTMENT (OUTPATIENT)
Dept: GENERAL RADIOLOGY | Age: 72
DRG: 175 | End: 2017-12-10
Attending: INTERNAL MEDICINE
Payer: MEDICARE

## 2017-12-10 LAB
APTT PPP: 115.7 SEC (ref 23.2–35.3)
APTT PPP: 149.1 SEC (ref 23.2–35.3)
APTT PPP: 92.4 SEC (ref 23.2–35.3)
GLUCOSE BLD STRIP.AUTO-MCNC: 129 MG/DL (ref 65–100)
GLUCOSE BLD STRIP.AUTO-MCNC: 135 MG/DL (ref 65–100)
GLUCOSE BLD STRIP.AUTO-MCNC: 156 MG/DL (ref 65–100)
GLUCOSE BLD STRIP.AUTO-MCNC: 193 MG/DL (ref 65–100)
PROCALCITONIN SERPL-MCNC: 1.1 NG/ML

## 2017-12-10 PROCEDURE — 36415 COLL VENOUS BLD VENIPUNCTURE: CPT | Performed by: INTERNAL MEDICINE

## 2017-12-10 PROCEDURE — 65270000029 HC RM PRIVATE

## 2017-12-10 PROCEDURE — 99232 SBSQ HOSP IP/OBS MODERATE 35: CPT | Performed by: INTERNAL MEDICINE

## 2017-12-10 PROCEDURE — 74011250637 HC RX REV CODE- 250/637: Performed by: INTERNAL MEDICINE

## 2017-12-10 PROCEDURE — 74011250636 HC RX REV CODE- 250/636

## 2017-12-10 PROCEDURE — 82962 GLUCOSE BLOOD TEST: CPT

## 2017-12-10 PROCEDURE — 71010 XR CHEST PORT: CPT

## 2017-12-10 PROCEDURE — 85730 THROMBOPLASTIN TIME PARTIAL: CPT | Performed by: INTERNAL MEDICINE

## 2017-12-10 PROCEDURE — 84145 PROCALCITONIN (PCT): CPT | Performed by: INTERNAL MEDICINE

## 2017-12-10 PROCEDURE — 74011250636 HC RX REV CODE- 250/636: Performed by: FAMILY MEDICINE

## 2017-12-10 PROCEDURE — 74011250636 HC RX REV CODE- 250/636: Performed by: INTERNAL MEDICINE

## 2017-12-10 PROCEDURE — 74011636637 HC RX REV CODE- 636/637: Performed by: INTERNAL MEDICINE

## 2017-12-10 RX ORDER — HEPARIN SODIUM 5000 [USP'U]/ML
INJECTION, SOLUTION INTRAVENOUS; SUBCUTANEOUS
Status: COMPLETED
Start: 2017-12-10 | End: 2017-12-10

## 2017-12-10 RX ORDER — HEPARIN SODIUM 5000 [USP'U]/ML
35 INJECTION, SOLUTION INTRAVENOUS; SUBCUTANEOUS ONCE
Status: COMPLETED | OUTPATIENT
Start: 2017-12-10 | End: 2017-12-10

## 2017-12-10 RX ORDER — HEPARIN SODIUM 5000 [USP'U]/ML
30 INJECTION, SOLUTION INTRAVENOUS; SUBCUTANEOUS ONCE
Status: DISCONTINUED | OUTPATIENT
Start: 2017-12-10 | End: 2017-12-10

## 2017-12-10 RX ORDER — SEVELAMER HYDROCHLORIDE 400 MG/1
800 TABLET, FILM COATED ORAL
Status: DISCONTINUED | OUTPATIENT
Start: 2017-12-10 | End: 2017-12-12

## 2017-12-10 RX ORDER — ONDANSETRON 2 MG/ML
4 INJECTION INTRAMUSCULAR; INTRAVENOUS
Status: DISCONTINUED | OUTPATIENT
Start: 2017-12-10 | End: 2017-12-19 | Stop reason: HOSPADM

## 2017-12-10 RX ADMIN — FLUDROCORTISONE ACETATE 100 MCG: 0.1 TABLET ORAL at 22:12

## 2017-12-10 RX ADMIN — HUMAN INSULIN 2 UNITS: 100 INJECTION, SOLUTION SUBCUTANEOUS at 14:52

## 2017-12-10 RX ADMIN — HEPARIN SODIUM AND DEXTROSE 20 UNITS/KG/HR: 5000; 5 INJECTION INTRAVENOUS at 09:05

## 2017-12-10 RX ADMIN — RENAGEL 800 MG: 400 TABLET ORAL at 17:11

## 2017-12-10 RX ADMIN — ONDANSETRON 4 MG: 2 INJECTION INTRAMUSCULAR; INTRAVENOUS at 17:11

## 2017-12-10 RX ADMIN — Medication: at 22:12

## 2017-12-10 RX ADMIN — PANTOPRAZOLE SODIUM 40 MG: 40 TABLET, DELAYED RELEASE ORAL at 08:54

## 2017-12-10 RX ADMIN — HEPARIN SODIUM: 5000 INJECTION INTRAVENOUS; SUBCUTANEOUS at 02:00

## 2017-12-10 RX ADMIN — LEVOTHYROXINE SODIUM 175 MCG: 50 TABLET ORAL at 05:18

## 2017-12-10 RX ADMIN — HEPARIN SODIUM 3350 UNITS: 5000 INJECTION, SOLUTION INTRAVENOUS; SUBCUTANEOUS at 01:27

## 2017-12-10 RX ADMIN — Medication 10 ML: at 14:53

## 2017-12-10 RX ADMIN — Medication 5 ML: at 05:17

## 2017-12-10 RX ADMIN — HEPARIN SODIUM AND DEXTROSE 20 UNITS/KG/HR: 5000; 5 INJECTION INTRAVENOUS at 19:03

## 2017-12-10 RX ADMIN — Medication 5 ML: at 22:12

## 2017-12-10 RX ADMIN — Medication: at 08:55

## 2017-12-10 RX ADMIN — MIDODRINE HYDROCHLORIDE 5 MG: 5 TABLET ORAL at 17:12

## 2017-12-10 RX ADMIN — HUMAN INSULIN 2 UNITS: 100 INJECTION, SOLUTION SUBCUTANEOUS at 17:13

## 2017-12-10 NOTE — PROGRESS NOTES
aptt critical received from altagracia in the lab 92.4. Pt is stable. No change in heparin drip rate needed. Will continue to monitor.

## 2017-12-10 NOTE — PROGRESS NOTES
Problem: Falls - Risk of  Goal: *Absence of Falls  Document Moy Fall Risk and appropriate interventions in the flowsheet.    Outcome: Progressing Towards Goal  Fall Risk Interventions:  Mobility Interventions: Patient to call before getting OOB         Medication Interventions: Bed/chair exit alarm    Elimination Interventions: Patient to call for help with toileting needs    History of Falls Interventions: Bed/chair exit alarm

## 2017-12-10 NOTE — PROGRESS NOTES
Daily Progress Note: 12/10/2017    Rob Joshua   Admission Date: 12/6/2017         The patient's chart is reviewed and the patient is discussed with the staff. The patient's chart is reviewed and the patient is discussed with the staff. Rob Joshua is a 69yo F who was admitted on 12/6 with ESRD, CHF 40%, DM, orhtostatic hypotension who presented from rehab with right leg wound and severe hypoxia and was found to have large bilateral pulmonary emboli. EKOS performed 12/8 with possible PFO noted. Subjective:     Tm 100.1. No significant chills or sputum. Just sitting on side of bed. Was only able to walk about 30 feet before admission.       Current Facility-Administered Medications   Medication Dose Route Frequency    heparin 25,000 units in dextrose 500 mL infusion  18-36 Units/kg/hr IntraVENous TITRATE    sodium chloride (NS) flush 5-10 mL  5-10 mL IntraVENous Q8H    sodium chloride (NS) flush 5-10 mL  5-10 mL IntraVENous PRN    fludrocortisone (FLORINEF) tablet 100 mcg  100 mcg Oral QHS    levothyroxine (SYNTHROID) tablet 175 mcg  175 mcg Oral ACB    pantoprazole (PROTONIX) tablet 40 mg  40 mg Oral DAILY    insulin regular (NOVOLIN R, HUMULIN R) injection   SubCUTAneous Q6H    acetaminophen (TYLENOL) tablet 650 mg  650 mg Oral Q4H PRN    zinc oxide-cod liver oil (DESITIN) 40 % paste   Topical BID    midodrine (PROAMITINE) tablet 5 mg  5 mg Oral TID WITH MEALS       Review of Systems      Constitutional: negative for fever, chills, sweats  Cardiac: negative for chest pain, palpitations, syncope, edema  GI: negative for dysphagia, reflux, vomiting, diarrhea, abdominal pain, or melena  Neuro: negative for focal weakness, numbness, headache      Objective:     Vitals:    12/10/17 0335 12/10/17 0649 12/10/17 0723 12/10/17 1038   BP: 135/70  145/75 148/73   Pulse: 76  81 73   Resp: 19  20 20   Temp: 100.1 °F (37.8 °C)  97.9 °F (36.6 °C) 99.5 °F (37.5 °C)   SpO2: 100%  98% 98% Weight:  209 lb 8 oz (95 kg)     Height:           Intake and Output:   12/08 1901 - 12/10 0700  In: 120 [P.O.:120]  Out: 2000        Physical Exam:          Constitutional:  the patient is obese and in no acute distress  EENMT:  Sclera clear, pupils equal, oral mucosa moist  Respiratory: decreased BS  Cardiovascular:  RRR without M,G,R  Gastrointestinal: soft and non-tender; with positive bowel sounds. Musculoskeletal: warm without cyanosis. There is no lower leg edema. Skin:  no jaundice or rashes, no wounds   Neurologic: no gross neuro deficits     Psychiatric:  lethargic    LINES:  Per iv lines     DRIPS:   Heparin gtt    CXR:     12/10:        ? Early RUL infiltrate    Echo:    INDICATIONS: Rule out Shunt, with Bubble    HISTORY: PRIOR HISTORY: 2-D Complete Echo 12-6-2017    PROCEDURE: This was a routine study. A transthoracic echocardiogram was  performed. The study included limited 2D imaging and limited spectral   Doppler. Intravenous contrast (agitated saline) was administered in the right arm. Image  quality was adequate. ATRIAL SEPTUM: There was a mild right-to-left shunt, induced by the Valsalva  maneuver. SUMMARY:    -  Atrial septum: There was a mild right-to-left shunt, induced by the   Valsalva  maneuver. LAB  Recent Labs      12/10/17   0521  12/10/17   0018  12/09/17   1717  12/09/17   1141  12/09/17   0608   GLUCPOC  129*  135*  152*  129*  134*      Recent Labs      12/08/17   1204  12/08/17   0541  12/08/17   0045   WBC  5.0  4.8  4.7   HGB  7.8*  8.4*  8.1*   HCT  25.4*  27.3*  26.0*   PLT  107*  121*  123*   INR  1.2  1.2  1.1     Recent Labs      12/09/17   0408  12/08/17   0541   NA  135*  135*   K  3.8  3.8   CL  96*  98   CO2  26  27   GLU  115*  127*   BUN  47*  37*   CREA  7.92*  6.45*   PHOS   --   5.0*   CA  8.7  9.4   ALB   --   2.8*     No results for input(s): PH, PCO2, PO2, HCO3 in the last 72 hours.     Assessment:  (Medical Decision Making)     Hospital Problems Date Reviewed: 10/18/2017          Codes Class Noted POA    Leg wound, right ICD-10-CM: G58.696Y  ICD-9-CM: 891.0  12/7/2017 Unknown        * (Principal)Acute pulmonary embolism (HCC)  on Heparin gtt , s/p EKOS ICD-10-CM: I26.99  ICD-9-CM: 415.19  12/6/2017 Yes    Has mild R>L shunt on bubble echo    Acute respiratory failure ([facility]    ICD-10-CM: J96.00  ICD-9-CM: 518.81  12/6/2017 Yes    Wean to NC as tolerated. Systolic CHF, chronic (HCC) (Chronic) ICD-10-CM: I50.22  ICD-9-CM: 428.22, 428.0  10/18/2017 Yes        Orthostatic hypotension ICD-10-CM: I95.1  ICD-9-CM: 458.0  Unknown Yes    On florinef    ESRD on hemodialysis (HCC) (Chronic) ICD-10-CM: N18.6, Z99.2  ICD-9-CM: 585.6, V45.11  8/28/2014 Yes    HD performed yesterday    CASSIE (obstructive sleep apnea) (Chronic) ICD-10-CM: A50.01  ICD-9-CM: 327.23  7/16/2014 Yes    Overview Signed 7/16/2014  1:18 AM by Jasvir Bates NP     BIPAP         Using BIPAP with sleep only now    DM (diabetes mellitus) ([facility] [address] (Chronic) ICD-10-CM: E11.9  ICD-9-CM: 250.00  7/16/2014 Yes    BS controlled    Lupus (Chronic) ICD-10-CM: L93.0  ICD-9-CM: 695.4  6/19/2014 Yes        Morbid obesity ([facility] [address] (Chronic) ICD-10-CM: E66.01  ICD-9-CM: 278.01  6/19/2014 Yes    Saad          Plan:  (Medical Decision Making)     Continue heparin drip. Warfarin once need for procedures completed. Increase activity. PT eval in AM.  HD per nephrology  BIPAP with sleep. Wean oxygen as tolerated. Check PCT given possible early infiltrate. Pt is DNR. More than 50% of the time documented was spent in face-to-face contact with the patient and in the care of the patient on the floor/unit where the patient is located.     Tere Cooper MD

## 2017-12-10 NOTE — PROGRESS NOTES
Pt in room alert and oriented with some confusion. Pt has no c/o pain no distress noted at current time. Pt is stable. Family at bedside. Dressing to rt lower ext clean dry and intact. Pt still has some swelling to left arm with +2 pitting. Will continue to monitor. Call light in reach.

## 2017-12-10 NOTE — PROGRESS NOTES
Pt had a c/o nausea Dr Saira Funk notified new orders for Zofran 4mg received pt tolerated well. Pt had a scant amount of blood coming from her nose. Respiratory notified and humidifier suggested. Will continue to monitor. No increased bleeding or bruising noted.

## 2017-12-11 ENCOUNTER — PATIENT OUTREACH (OUTPATIENT)
Dept: CASE MANAGEMENT | Age: 72
End: 2017-12-11

## 2017-12-11 LAB
APTT PPP: 176.6 SEC (ref 23.2–35.3)
APTT PPP: 74.5 SEC (ref 23.2–35.3)
BACTERIA SPEC CULT: NORMAL
BACTERIA SPEC CULT: NORMAL
GLUCOSE BLD STRIP.AUTO-MCNC: 108 MG/DL (ref 65–100)
GLUCOSE BLD STRIP.AUTO-MCNC: 127 MG/DL (ref 65–100)
GLUCOSE BLD STRIP.AUTO-MCNC: 131 MG/DL (ref 65–100)
GLUCOSE BLD STRIP.AUTO-MCNC: 173 MG/DL (ref 65–100)
INR PPP: 1
PROTHROMBIN TIME: 13.4 SEC (ref 11.5–14.5)
SERVICE CMNT-IMP: NORMAL
SERVICE CMNT-IMP: NORMAL

## 2017-12-11 PROCEDURE — 94660 CPAP INITIATION&MGMT: CPT

## 2017-12-11 PROCEDURE — 74011250636 HC RX REV CODE- 250/636: Performed by: INTERNAL MEDICINE

## 2017-12-11 PROCEDURE — 85730 THROMBOPLASTIN TIME PARTIAL: CPT | Performed by: INTERNAL MEDICINE

## 2017-12-11 PROCEDURE — 99232 SBSQ HOSP IP/OBS MODERATE 35: CPT | Performed by: INTERNAL MEDICINE

## 2017-12-11 PROCEDURE — 36415 COLL VENOUS BLD VENIPUNCTURE: CPT | Performed by: INTERNAL MEDICINE

## 2017-12-11 PROCEDURE — 86580 TB INTRADERMAL TEST: CPT | Performed by: INTERNAL MEDICINE

## 2017-12-11 PROCEDURE — 97162 PT EVAL MOD COMPLEX 30 MIN: CPT

## 2017-12-11 PROCEDURE — 82962 GLUCOSE BLOOD TEST: CPT

## 2017-12-11 PROCEDURE — 74011250637 HC RX REV CODE- 250/637: Performed by: INTERNAL MEDICINE

## 2017-12-11 PROCEDURE — 90935 HEMODIALYSIS ONE EVALUATION: CPT

## 2017-12-11 PROCEDURE — 77010033678 HC OXYGEN DAILY

## 2017-12-11 PROCEDURE — 65270000029 HC RM PRIVATE

## 2017-12-11 PROCEDURE — 94760 N-INVAS EAR/PLS OXIMETRY 1: CPT

## 2017-12-11 PROCEDURE — 85610 PROTHROMBIN TIME: CPT | Performed by: INTERNAL MEDICINE

## 2017-12-11 PROCEDURE — 74011000302 HC RX REV CODE- 302: Performed by: INTERNAL MEDICINE

## 2017-12-11 RX ORDER — WARFARIN 2.5 MG/1
5 TABLET ORAL EVERY EVENING
Status: DISCONTINUED | OUTPATIENT
Start: 2017-12-11 | End: 2017-12-19 | Stop reason: HOSPADM

## 2017-12-11 RX ADMIN — Medication 5 ML: at 17:39

## 2017-12-11 RX ADMIN — Medication 5 ML: at 05:41

## 2017-12-11 RX ADMIN — LEVOTHYROXINE SODIUM 175 MCG: 50 TABLET ORAL at 05:41

## 2017-12-11 RX ADMIN — WARFARIN SODIUM 5 MG: 2.5 TABLET ORAL at 17:34

## 2017-12-11 RX ADMIN — Medication 10 ML: at 20:24

## 2017-12-11 RX ADMIN — ACETAMINOPHEN 650 MG: 325 TABLET ORAL at 23:34

## 2017-12-11 RX ADMIN — HEPARIN SODIUM AND DEXTROSE 20 UNITS/KG/HR: 5000; 5 INJECTION INTRAVENOUS at 00:49

## 2017-12-11 RX ADMIN — TUBERCULIN PURIFIED PROTEIN DERIVATIVE 5 UNITS: 5 INJECTION INTRADERMAL at 17:40

## 2017-12-11 RX ADMIN — FLUDROCORTISONE ACETATE 100 MCG: 0.1 TABLET ORAL at 20:24

## 2017-12-11 RX ADMIN — Medication: at 20:26

## 2017-12-11 RX ADMIN — Medication: at 15:00

## 2017-12-11 NOTE — DIALYSIS
Hemodialysis treatment initiated using Left LA graft and 15 g needles by Brooklyn Don. Pt alert and VS stable. Machine settings per MD order. Will monitor during treatment.

## 2017-12-11 NOTE — PROGRESS NOTES
Repeat call to pharmacy, spoke with [facility] pharmacist, states new bad is being tubed at this time

## 2017-12-11 NOTE — PROGRESS NOTES
[facility] Progress Note    Follow-Up on: ESRD    Patient seen and examined on dialysis. Goal UF 2.5 kg. VSS. Left arm AVG Qb 400. Tolerating well. No c/o other than being \"cold\" and sleepy. ROS:  Gen - no fever, no chills, appetite unchanged  CV - no chest pain, no palpitation  Lung - shortness of breath better, no cough  Abd - no tenderness, no nausea/vomiting, no diarrhea  Ext - no edema    Exam:  Vitals:    12/11/17 0545 12/11/17 0745 12/11/17 0816 12/11/17 0826   BP:  128/60  118/59   Pulse:  72  74   Resp:  18     Temp:  98.1 °F (36.7 °C)     SpO2:  100% 100%    Weight: 97.4 kg (214 lb 11.2 oz)      Height:             Intake/Output Summary (Last 24 hours) at 12/11/17 0920  Last data filed at 12/11/17 0352   Gross per 24 hour   Intake              170 ml   Output                0 ml   Net              170 ml       Wt Readings from Last 3 Encounters:   12/11/17 97.4 kg (214 lb 11.2 oz)   09/22/17 94.8 kg (209 lb)   08/17/17 95.3 kg (210 lb)       GEN - in no distress, nasal cannula O2  CV - regular, no murmur, no rub  Lung - clear bilaterally  Abd - soft, nontender  Ext - no edema    Recent Labs      12/08/17   1204   WBC  5.0   HGB  7.8*   HCT  25.4*   PLT  107*   INR  1.2        Recent Labs      12/09/17   0408   NA  135*   K  3.8   CL  96*   CO2  26   BUN  47*   CREA  7.92*   CA  8.7   GLU  115*       Assessment / Plan:  Principal Problem:    Acute pulmonary embolism ([facility] [address] (12/6/2017)    Active Problems:    Lupus (6/19/2014)      Morbid obesity (HCC) (6/19/2014)      CASSIE (obstructive sleep apnea) (7/16/2014)      Overview: BIPAP      DM (diabetes mellitus) ([facility] [address] (7/16/2014)      ESRD on hemodialysis ([facility] [address] (8/28/2014)      Orthostatic hypotension ()      Systolic CHF, chronic ([facility] [address] (10/18/2017)      Acute respiratory failure ([facility] [address] (12/6/2017)      Leg wound, right (12/7/2017)      1. ESRD - HD today per routine MWF schedule  2.  Large bilateral pulmonary embolisms - s/p thrombolysis 12/8 - moderate decrease in clot burden. Heparin and warfarin  3. H/o CHF- EF 40%  4. Right leg wound  5. Anemia - multifactorial. Recheck labs in am. Resume RODRIGO  6. Orthostatic hypotension - on florinef and midodrine.  bp stable

## 2017-12-11 NOTE — PROGRESS NOTES
Warfarin dosing per pharmacist    Siddharth Ramesh is a 81 y.o. female. Height: 5' 3\" (160 cm)    Weight: 97.4 kg (214 lb 11.2 oz)    Indication:  Bilateral PE's, s/p clot burden reduction with EKOS    Goal INR:  2-3    Home dose:  New start    Risk factors or significant drug interactions:  Levothyroxine (appears euthyroid)    Other anticoagulants:  Heparin drip for bridge therapy while INR subtherapeutic    Daily Monitoring  Date  INR      Warfarin dose HGB              Notes  12/11  (pending)  5 mg  ---      Pharmacy consulted to assist dosing warfarin in patient who presented with large bilateral PE's s/p EKOS on 12/8 for clot burden reduction. Will check INR today prior to starting therapy, although was 1.2 on 12/8 so doubt any issues will arise with baseline INR. Will start with 5 mg every evening and continue to follow INR daily. Would continue bridge therapy with heparin drip for at least 5 days and until INR therapeutic. Will continue to follow.     Thank you,  Odalys Major, PharmD  Clinical Pharmacist  [phone]

## 2017-12-11 NOTE — PROGRESS NOTES
Patient bback to room 827 from PACU. Oriented patient to room and floor. R arm dressing dry and intact.

## 2017-12-11 NOTE — PROGRESS NOTES
Inpatient case management referral - high risk for readmission  Current admission michael 12/6 - Acute PE  RRAT 30    Patient with [facility], but dialyzed \"in house\" today  (Unable to see patient - out of her room)      Plan - follow along with Inpatient CM  Pulmonary Progress notes discuss placement

## 2017-12-11 NOTE — DIALYSIS
Hemodialysis ended without problems. B/P -100/55, HR - 70, 2.5  kg's removed. Patient tolerated treatment well and is awaiting transport to her room.

## 2017-12-11 NOTE — PROGRESS NOTES
JCQ=776.6/orders to stop for one hour and then resume per orders, call to HD spoke with [employer] RN, informed and nursing will follow up in one hour

## 2017-12-11 NOTE — PROGRESS NOTES
Physical therapy note: received orders for PT, pt off floor for hemodialysis. Will check back later as schedule allows to initiate assessment. Thanks.  Farrah Peter, PT

## 2017-12-11 NOTE — PROGRESS NOTES
Problem: Mobility Impaired (Adult and Pediatric)  Goal: *Acute Goals and Plan of Care (Insert Text)  STG:  (1.)Ms. Funmi Benavidez will move from supine to sit and sit to supine , scoot up and down and roll side to side with MINIMAL ASSIST within 3 day(s). (2.)Ms. Funmi Benavidez will transfer from bed to chair and chair to bed with MINIMAL ASSIST using the least restrictive device within 3 day(s). (3.)Ms. Funmi Benavidez will ambulate with MINIMAL ASSIST for 15 feet with the least restrictive device within 2 day(s). (4.)Ms. Funmi Benavidez will perform LE exercises with 1 to 2 cues for form within 3 days to improve strength for functional transfers and ambulation. (5.)Ms. Funmi Benavidez will perform standing static and dynamic balance activities x 15+ minutes with MINIMAL ASSIST to improve safety within 3 day(s). LTG:  (1.)Ms. Funmi Benavidez will move from supine to sit and sit to supine , scoot up and down and roll side to side in bed with CONTACT GUARD ASSIST within 7 day(s). (2.)Ms. Funmi Benavidez will transfer from bed to chair and chair to bed with CONTACT GUARD ASSIST using the least restrictive device within 7 day(s). (3.)Ms. Funmi Benavidez will ambulate with CONTACT GUARD ASSIST for 30 feet with the least restrictive device within 7 day(s). (4.)Ms. Funmi Benavidez will perform standing static and dynamic balance activities x 25 minutes with CONTACT GUARD ASSIST to improve safety within 7 day(s).   ________________________________________________________________________________________________      PHYSICAL THERAPY: Initial Assessment, PM 12/11/2017  INPATIENT: Hospital Day: 6  [place] / [place] / Product Type: Medicare /      NAME/AGE/GENDER: Radha Ruiz is a 81 y.o. female   PRIMARY DIAGNOSIS: Acute pulmonary embolism ([facility] Acute pulmonary embolism ([facility] Acute pulmonary embolism (HCC)        ICD-10: Treatment Diagnosis:   · Difficulty in walking, Not elsewhere classified (R26.2)   Precaution/Allergies:  Codeine ASSESSMENT:     Ms. Sally Rodríguez is a 81 y.o. female with acute pulmonary embolism. She lives with  at baseline and ambulates with a rollator walker, but presents from rehab where she was walking very short distances. She had HD today and is very tired but agreeable to therapy. Wishes to attempt to get out of bed to eat lunch. She required moderate assistance to transfer to edge of bed and total assist to scoot. She stood with moderate assistance and only able to maintain standing for about 10 seconds prior to sitting. She was drowsy throughout session and deemed unsafe to attempt chair transfer this PM (may require assist x2 at this time). She returned to supine with maximal assistance and total assist to scoot up in bed. She is experiencing significant weakness as this time and presents at a high risk of falling. Jill Mendieta is currently functioning below her baseline and would benefit from skilled PT during acute care stay to maximize safety and independence with functional mobility. This section established at most recent assessment   PROBLEM LIST (Impairments causing functional limitations):  1. Decreased Strength  2. Decreased ADL/Functional Activities  3. Decreased Transfer Abilities  4. Decreased Ambulation Ability/Technique  5. Decreased Balance  6. Decreased Activity Tolerance  7. Decreased Knowledge of Precautions  8. Decreased [place] with Home Exercise Program   INTERVENTIONS PLANNED: (Benefits and precautions of physical therapy have been discussed with the patient.)  1. Balance Exercise  2. Bed Mobility  3. Family Education  4. Gait Training  5. Home Exercise Program (HEP)  6. Therapeutic Activites  7. Therapeutic Exercise/Strengthening  8. Transfer Training  9.  Group Therapy     TREATMENT PLAN: Frequency/Duration: 3 times a week for duration of hospital stay  Rehabilitation Potential For Stated Goals: Sherron Samano REHABILITATION/EQUIPMENT: (at time of discharge pending progress): Due to the probability of continued deficits (see above) this patient will likely need continued skilled physical therapy after discharge. Equipment:    None at this time              HISTORY:   History of Present Injury/Illness (Reason for Referral):  Per MD Note: \"Patient is a 81 y.o.  female presents with shortness of breath.     Ms Jacob Molina is a 81 yr old lady with ESRD, CHF EF40%, DM, orthostatic hypotension coming in from rehab with shortness of breath according to her daughter and being found hypotensive. Patient is not able to give much history but denies any chest pain, fever, chills or rigors. She does have a wound on her right leg that she thinks getting worse. Patient was found to be severely hypoxic in the ED requiring BIPAP 100% with POx 90%. I was called by the ED for admission but I asked for a CTA to rule out PE which came positive for bilateral PEs and I asked to start the heparin drip and went to assess her immediately. \"  Past Medical History/Comorbidities:   Ms. Jacob Molina  has a past medical history of Anemia of chronic disease; Arthritis; Chronic diastolic heart failure ([address] Chronic kidney disease; Chronic pain; Chronic respiratory failure ([address] Coagulation disorder ([address] Congestive heart failure, unspecified; Coronary atherosclerosis of native coronary vessel; CVA (cerebral vascular accident) ([address] (6/25/2014); Diabetes ([address] (1980's); Diabetic neuropathy ([address] DM (diabetes mellitus) ([address] (7/16/2014); End stage renal disease ([address] ESRD on hemodialysis ([address] (4/2014); Fibromyalgia; Gout; Heart failure ([address] Hemodialysis access site with mature fistula ([address] (8/27/14); Hemodialysis access, AV graft ([address] (9/9/2014); Hepatomegaly; History of stroke (8/28/2014); Hypercholesterolemia; Hypothyroidism (approx 2000); Lupus; Morbid obesity ([address] (6/21/16); NSVT (nonsustained ventricular tachycardia) ([facility] (6/19/2014);  Orthostatic hypotension; CASSIE (obstructive sleep apnea) (6/21/16); Poor historian (6/21/16); Sepsis ([facility] (6/19/2014); [facility] Unable to bear weight (8/28/2014); Unspecified sleep apnea (11/13); Vertigo as late effect of stroke (6/21/16); and Weakness of both lower limbs (8/28/2014). She also has no past medical history of Coagulation defects or Nausea & vomiting. Ms. Albert Ravi  has a past surgical history that includes lap cholecystectomy (2009); hysterectomy (1975); vascular access (2013 and 4); vascular access (8/27/14); vascular access; vascular surgery procedure unlist (8-27-14); vascular surgery procedure unlist; gi; gi; and colonoscopy (N/A, 6/23/2016). Social History/Living Environment:   Home Environment: Private residence  # Steps to Enter: 1  One/Two Story Residence: One story  Living Alone: No  Support Systems: Spouse/Significant Other/Partner  Patient Expects to be Discharged to[de-identified] Rehabilitation facility  Current DME Used/Available at Home: Shankar Sandoval, rollator  Prior Level of Function/Work/Activity:  Patient ambulated with rollator walker prior to admission. Number of Personal Factors/Comorbidities that affect the Plan of Care: 3+: HIGH COMPLEXITY   EXAMINATION:   Most Recent Physical Functioning:   Gross Assessment:  AROM: Generally decreased, functional  PROM: Generally decreased, functional  Strength: Generally decreased, functional  Coordination: Generally decreased, functional  Tone: Normal  Sensation: Impaired               Posture:  Posture (WDL): Exceptions to WDL  Posture Assessment: Forward head, Rounded shoulders  Balance:  Sitting: Impaired  Sitting - Static: Fair (occasional)  Sitting - Dynamic: Fair (occasional)  Standing: Impaired  Standing - Static: Fair  Standing - Dynamic : Poor Bed Mobility:  Rolling: Moderate assistance  Supine to Sit: Moderate assistance  Sit to Supine: Maximum assistance  Scooting: Total assistance  Wheelchair Mobility:     Transfers:  Sit to Stand:  Moderate assistance  Stand to Sit: Moderate assistance  Gait: Body Structures Involved:  1. Nerves  2. Lungs  3. Muscles Body Functions Affected:  1. Sensory/Pain  2. Respiratory  3. Neuromusculoskeletal  4. Movement Related Activities and Participation Affected:  1. Mobility  2. Self Care  3. Domestic Life  4. Interpersonal Interactions and Relationships  5. Community, Social and [place]   Number of elements that affect the Plan of Care: 4+: HIGH COMPLEXITY   CLINICAL PRESENTATION:   Presentation: Evolving clinical presentation with changing clinical characteristics: MODERATE COMPLEXITY   CLINICAL DECISION MAKIN [facility] Inpatient Short Form  How much difficulty does the patient currently have. .. Unable A Lot A Little None   1. Turning over in bed (including adjusting bedclothes, sheets and blankets)? [] 1   [x] 2   [] 3   [] 4   2. Sitting down on and standing up from a chair with arms ( e.g., wheelchair, bedside commode, etc.)   [] 1   [x] 2   [] 3   [] 4   3. Moving from lying on back to sitting on the side of the bed? [] 1   [x] 2   [] 3   [] 4   How much help from another person does the patient currently need. .. Total A Lot A Little None   4. Moving to and from a bed to a chair (including a wheelchair)? [] 1   [x] 2   [] 3   [] 4   5. Need to walk in hospital room? [x] 1   [] 2   [] 3   [] 4   6. Climbing 3-5 steps with a railing? [x] 1   [] 2   [] 3   [] 4   © , Trustees of [address] [place] [address], under license to [employer]. All rights reserved      Score:  Initial: 10 Most Recent: X (Date: -- )    Interpretation of Tool:  Represents activities that are increasingly more difficult (i.e. Bed mobility, Transfers, Gait). Score 24 23 22-20 19-15 14-10 9-7 6     Modifier CH CI CJ CK CL CM CN      ?  Mobility - Walking and Moving Around:     - CURRENT STATUS: CL - 60%-79% impaired, limited or restricted    - GOAL STATUS: CK - 40%-59% impaired, limited or restricted    - D/C STATUS:  ---------------To be determined---------------  [place] / [place] / Product Type: Medicare /      Medical Necessity:     · Patient demonstrates good rehab potential due to higher previous functional level. Reason for Services/Other Comments:  · Patient continues to require modification of therapeutic interventions to increase complexity of exercises. Use of outcome tool(s) and clinical judgement create a POC that gives a: Questionable prediction of patient's progress: MODERATE COMPLEXITY            TREATMENT:   (In addition to Assessment/Re-Assessment sessions the following treatments were rendered)   Pre-treatment Symptoms/Complaints:  Fatigue. Pain: Initial:   Pain Intensity 1: 0  Post Session:  0/10     Assessment/Reassessment only, no treatment provided today    Braces/Orthotics/Lines/Etc:   · IV  · O2 Device: Nasal cannula  Treatment/Session Assessment:    · Response to Treatment:  Patient experienced fatigue this session. · Interdisciplinary Collaboration:   o Physical Therapist  o Registered Nurse  · After treatment position/precautions:   o Supine in bed  o Bed/Chair-wheels locked  o Bed in low position  o Call light within reach  o RN notified   · Compliance with Program/Exercises: Will assess as treatment progresses. · Recommendations/Intent for next treatment session: \"Next visit will focus on advancements to more challenging activities and reduction in assistance provided\".   Total Treatment Duration:  PT Patient Time In/Time Out  Time In: 1355  Time Out: [address], DPT

## 2017-12-11 NOTE — PROGRESS NOTES
[facility]  [facility] Date: 12[facility]/2017             Daily Progress Note: 12/11/2017    The patient's chart is reviewed and the patient is discussed with the staff. The patient's chart is reviewed and the patient is discussed with the staff. Rose Marie Valdovinos a 69yo F who was admitted on 12/6 with ESRD, CHF 40%, DM, orhtostatic hypotension who presented from rehab with right leg wound and severe hypoxia and was found to have large bilateral pulmonary emboli. EKOS performed 12/8 with possible PFO noted. TTE with only mild right to left shunt. Subjective:     Patient is laying in bed. Just returned from HD and does not feel well. Satting wel on 3L O2. No new issues so far today.      Current Facility-Administered Medications   Medication Dose Route Frequency    [START ON 12/13/2017] epoetin arlyn (EPOGEN;PROCRIT) injection 10,000 Units  10,000 Units IntraVENous DIALYSIS MON, WED & FRI    sevelamer (RENAGEL) tablet 800 mg  800 mg Oral TID WITH MEALS    ondansetron (ZOFRAN) injection 4 mg  4 mg IntraVENous Q6H PRN    heparin 25,000 units in dextrose 500 mL infusion  18-36 Units/kg/hr IntraVENous TITRATE    sodium chloride (NS) flush 5-10 mL  5-10 mL IntraVENous Q8H    sodium chloride (NS) flush 5-10 mL  5-10 mL IntraVENous PRN    fludrocortisone (FLORINEF) tablet 100 mcg  100 mcg Oral QHS    levothyroxine (SYNTHROID) tablet 175 mcg  175 mcg Oral ACB    pantoprazole (PROTONIX) tablet 40 mg  40 mg Oral DAILY    insulin regular (NOVOLIN R, HUMULIN R) injection   SubCUTAneous Q6H    acetaminophen (TYLENOL) tablet 650 mg  650 mg Oral Q4H PRN    zinc oxide-cod liver oil (DESITIN) 40 % paste   Topical BID    midodrine (PROAMITINE) tablet 5 mg  5 mg Oral TID WITH MEALS       Review of Systems  Constitutional: negative for fever, chills, sweats  Cardiovascular: negative for chest pain, palpitations, syncope, edema  Gastrointestinal: negative for dysphagia, reflux, vomiting, diarrhea, abdominal pain, or melena  Neurologic: negative for focal weakness, numbness, headache    Objective:     Vitals:    12/11/17 1205 12/11/17 1232 12/11/17 1300 12/11/17 1401   BP: 124/61 111/56 122/57 120/57   Pulse: 71 67 68 73   Resp:    18   Temp:    97.6 °F (36.4 °C)   SpO2:    100%   Weight:       Height:         Intake and Output:   12/09 1901 - 12/11 0700  In: 290 [P.O.:290]  Out: -   12/11 0701 - 12/11 1900  In: -   Out: 2500     Physical Exam:   Constitution:  the patient is well developed and in no acute distress  EENMT:  Sclera clear, pupils equal, oral mucosa moist  Respiratory: CTA B, no w/r/r  Cardiovascular:  RRR without M,G,R  Gastrointestinal: soft and non-tender; with positive bowel sounds. Musculoskeletal: warm without cyanosis. There is no lower leg edema. Skin:  no jaundice or rashes, no wounds   Neurologic: no gross neuro deficits     Psychiatric:  alert and oriented x ppt    CHEST XRAY:   No new imaging    LAB  No lab exists for component: GLPOC   No results for input(s): WBC, HGB, HCT, PLT, INR, HGBEXT, HCTEXT, PLTEXT in the last 72 hours. No lab exists for component: INREXT  Recent Labs      12/09/17   0408   NA  135*   K  3.8   CL  96*   CO2  26   GLU  115*   BUN  47*   CREA  7.92*   CA  8.7     No results for input(s): PH, PCO2, PO2, HCO3 in the last 72 hours.       Assessment:  (Medical Decision Making)     Hospital Problems  Date Reviewed: 10/18/2017          Codes Class Noted POA    Leg wound, right ICD-10-CM: S81.801A  ICD-9-CM: 891.0  12/7/2017 Unknown        * (Principal)Acute pulmonary embolism ([facility] [address] ICD-10-CM: I26.99  ICD-9-CM: 415.19  12/6/2017 Yes        Acute respiratory failure ([facility] [address] ICD-10-CM: J96.00  ICD-9-CM: 518.81  12/6/2017 Yes        Systolic CHF, chronic (HCC) (Chronic) ICD-10-CM: I50.22  ICD-9-CM: 428.22, 428.0  10/18/2017 Yes        Orthostatic hypotension ICD-10-CM: I95.1  ICD-9-CM: 458.0  Unknown Yes        ESRD on hemodialysis (HCC) (Chronic) ICD-10-CM: N18.6, Z99.2  ICD-9-CM: 585.6, V45.11  8/28/2014 Yes        CASSIE (obstructive sleep apnea) (Chronic) ICD-10-CM: G47.33  ICD-9-CM: 327.23  7/16/2014 Yes    Overview Signed 7/16/2014  1:18 AM by PARESH West             DM (diabetes mellitus) ([facility] (Chronic) ICD-10-CM: E11.9  ICD-9-CM: 250.00  7/16/2014 Yes        Lupus (Chronic) ICD-10-CM: L93.0  ICD-9-CM: 695.4  6/19/2014 Yes        Morbid obesity ([facility] [facility] (Chronic) ICD-10-CM: E66.01  ICD-9-CM: 278.01  6/19/2014 Yes            Patient with B PE's s/p EKOS. Has been on heparin drip. No forseeable invasive procedures.      Plan:  (Medical Decision Making)   -will ask pharmacy to start dosing coumadin and overlap with heparin gtt until therapeutic.   -ask rt to continue weaning O2 to maintain sats >90-92%.   -case management to help with placement.   -cont florinef and SHARITA Bonds MD

## 2017-12-12 LAB
ALBUMIN SERPL-MCNC: 2.6 G/DL (ref 3.2–4.6)
ANION GAP SERPL CALC-SCNC: 12 MMOL/L (ref 7–16)
APTT PPP: 109.7 SEC (ref 23.2–35.3)
BUN SERPL-MCNC: 13 MG/DL (ref 8–23)
CALCIUM SERPL-MCNC: 10 MG/DL (ref 8.3–10.4)
CHLORIDE SERPL-SCNC: 98 MMOL/L (ref 98–107)
CO2 SERPL-SCNC: 29 MMOL/L (ref 21–32)
CREAT SERPL-MCNC: 3.87 MG/DL (ref 0.6–1)
ERYTHROCYTE [DISTWIDTH] IN BLOOD BY AUTOMATED COUNT: 16.8 % (ref 11.9–14.6)
GLUCOSE BLD STRIP.AUTO-MCNC: 101 MG/DL (ref 65–100)
GLUCOSE BLD STRIP.AUTO-MCNC: 116 MG/DL (ref 65–100)
GLUCOSE BLD STRIP.AUTO-MCNC: 130 MG/DL (ref 65–100)
GLUCOSE BLD STRIP.AUTO-MCNC: 136 MG/DL (ref 65–100)
GLUCOSE SERPL-MCNC: 104 MG/DL (ref 65–100)
HCT VFR BLD AUTO: 27.3 % (ref 35.8–46.3)
HGB BLD-MCNC: 8.1 G/DL (ref 11.7–15.4)
INR PPP: 1.3
MCH RBC QN AUTO: 32.7 PG (ref 26.1–32.9)
MCHC RBC AUTO-ENTMCNC: 29.7 G/DL (ref 31.4–35)
MCV RBC AUTO: 110.1 FL (ref 79.6–97.8)
MM INDURATION POC: 0 MM (ref 0–5)
PHOSPHATE SERPL-MCNC: 4.2 MG/DL (ref 2.3–3.7)
PLATELET # BLD AUTO: 129 K/UL (ref 150–450)
PMV BLD AUTO: 9.4 FL (ref 10.8–14.1)
POTASSIUM SERPL-SCNC: 3.5 MMOL/L (ref 3.5–5.1)
PPD POC: NEGATIVE NEGATIVE
PROTHROMBIN TIME: 15.7 SEC (ref 11.5–14.5)
RBC # BLD AUTO: 2.48 M/UL (ref 4.05–5.25)
SODIUM SERPL-SCNC: 139 MMOL/L (ref 136–145)
WBC # BLD AUTO: 3.9 K/UL (ref 4.3–11.1)

## 2017-12-12 PROCEDURE — 94660 CPAP INITIATION&MGMT: CPT

## 2017-12-12 PROCEDURE — 85730 THROMBOPLASTIN TIME PARTIAL: CPT | Performed by: INTERNAL MEDICINE

## 2017-12-12 PROCEDURE — 74011250637 HC RX REV CODE- 250/637: Performed by: INTERNAL MEDICINE

## 2017-12-12 PROCEDURE — 74011250636 HC RX REV CODE- 250/636: Performed by: INTERNAL MEDICINE

## 2017-12-12 PROCEDURE — 36415 COLL VENOUS BLD VENIPUNCTURE: CPT | Performed by: INTERNAL MEDICINE

## 2017-12-12 PROCEDURE — 80069 RENAL FUNCTION PANEL: CPT | Performed by: INTERNAL MEDICINE

## 2017-12-12 PROCEDURE — 85610 PROTHROMBIN TIME: CPT | Performed by: INTERNAL MEDICINE

## 2017-12-12 PROCEDURE — 65270000029 HC RM PRIVATE

## 2017-12-12 PROCEDURE — 85027 COMPLETE CBC AUTOMATED: CPT | Performed by: INTERNAL MEDICINE

## 2017-12-12 PROCEDURE — 97166 OT EVAL MOD COMPLEX 45 MIN: CPT

## 2017-12-12 PROCEDURE — 99232 SBSQ HOSP IP/OBS MODERATE 35: CPT | Performed by: INTERNAL MEDICINE

## 2017-12-12 RX ORDER — SEVELAMER HYDROCHLORIDE 400 MG/1
2400 TABLET, FILM COATED ORAL
Status: DISCONTINUED | OUTPATIENT
Start: 2017-12-12 | End: 2017-12-19 | Stop reason: HOSPADM

## 2017-12-12 RX ADMIN — LEVOTHYROXINE SODIUM 175 MCG: 50 TABLET ORAL at 05:48

## 2017-12-12 RX ADMIN — RENAGEL 800 MG: 400 TABLET ORAL at 08:50

## 2017-12-12 RX ADMIN — Medication: at 20:51

## 2017-12-12 RX ADMIN — Medication 10 ML: at 05:47

## 2017-12-12 RX ADMIN — RENAGEL 800 MG: 400 TABLET ORAL at 11:48

## 2017-12-12 RX ADMIN — Medication 10 ML: at 20:49

## 2017-12-12 RX ADMIN — MIDODRINE HYDROCHLORIDE 5 MG: 5 TABLET ORAL at 16:42

## 2017-12-12 RX ADMIN — MIDODRINE HYDROCHLORIDE 5 MG: 5 TABLET ORAL at 08:50

## 2017-12-12 RX ADMIN — ACETAMINOPHEN 650 MG: 325 TABLET ORAL at 18:05

## 2017-12-12 RX ADMIN — FLUDROCORTISONE ACETATE 100 MCG: 0.1 TABLET ORAL at 20:49

## 2017-12-12 RX ADMIN — HEPARIN SODIUM AND DEXTROSE 17 UNITS/KG/HR: 5000; 5 INJECTION INTRAVENOUS at 10:41

## 2017-12-12 RX ADMIN — PANTOPRAZOLE SODIUM 40 MG: 40 TABLET, DELAYED RELEASE ORAL at 08:50

## 2017-12-12 RX ADMIN — Medication 10 ML: at 14:48

## 2017-12-12 RX ADMIN — MIDODRINE HYDROCHLORIDE 5 MG: 5 TABLET ORAL at 11:48

## 2017-12-12 RX ADMIN — ONDANSETRON 4 MG: 2 INJECTION INTRAMUSCULAR; INTRAVENOUS at 16:42

## 2017-12-12 RX ADMIN — WARFARIN SODIUM 5 MG: 2.5 TABLET ORAL at 17:27

## 2017-12-12 RX ADMIN — Medication: at 09:06

## 2017-12-12 NOTE — PROGRESS NOTES
Pt alert to person, place, time. Flat affect. on 3L hf nc. Respirations even and unlabored. L arm access +bruit/thrill. Rt leg dressing clean, dry and intact. Heparin gtt at 17u/kg/hr.

## 2017-12-12 NOTE — PROGRESS NOTES
[address] Nephrology Progress Note    Follow-Up on: ESRD  Dialysis yesterday and tolerated well. Feels fair today. No acute events overnight. ROS:  Gen - no fever, no chills, appetite unchanged  CV - no chest pain, no palpitation  Lung - shortness of breath better, no cough  Abd - no tenderness, no nausea/vomiting, no diarrhea  Ext - no edema    Exam:  Vitals:    12/12/17 0036 12/12/17 0443 12/12/17 0620 12/12/17 0737   BP: 123/69 94/74  123/59   Pulse: 63 93  74   Resp: 15 18  18   Temp: 99.2 °F (37.3 °C) 98.7 °F (37.1 °C)  98 °F (36.7 °C)   SpO2: 100% 93%  100%   Weight:   90 kg (198 lb 8 oz)    Height:             Intake/Output Summary (Last 24 hours) at 12/12/17 1031  Last data filed at 12/12/17 0800   Gross per 24 hour   Intake             1997 ml   Output             2500 ml   Net             -503 ml       Wt Readings from Last 3 Encounters:   12/12/17 90 kg (198 lb 8 oz)   09/22/17 94.8 kg (209 lb)   08/17/17 95.3 kg (210 lb)       GEN - in no distress, nasal cannula O2  CV - regular, no murmur, no rub  Lung - clear bilaterally  Abd - soft, nontender  Ext - +edema    Recent Labs      12/12/17   0650  12/11/17   1603   WBC  3.9*   --    HGB  8.1*   --    HCT  27.3*   --    PLT  129*   --    INR  1.3  1.0        Recent Labs      12/12/17   0650   NA  139   K  3.5   CL  98   CO2  29   BUN  13   CREA  3.87*   CA  10.0   GLU  104*   PHOS  4.2*       Assessment / Plan:  Principal Problem:    Acute pulmonary embolism ([facility] (12/6/2017)    Active Problems:    Lupus (6/19/2014)      Morbid obesity (HCC) (6/19/2014)      CASSIE (obstructive sleep apnea) (7/16/2014)      Overview: BIPAP      DM (diabetes mellitus) ([facility] (7/16/2014)      [facility] (8/28/2014)      Orthostatic hypotension ()      Systolic CHF, chronic ([facility] [facility] (10/18/2017)      Acute respiratory failure ([facility] [facility] (12/6/2017)      Leg wound, right (12/7/2017)      1. ESRD - HD tomorrow per routine MWF schedule  2.  Large bilateral pulmonary embolisms - s/p thrombolysis 12/8 - moderate decrease in clot burden. Heparin and warfarin. 3. H/o CHF- EF 40%  4. Right leg wound  5. Anemia - multifactorial. Stable at 8.1. Resume RODRIGO  6. Orthostatic hypotension - on florinef and midodrine. bp stable    HD tomorrow ordered.

## 2017-12-12 NOTE — PROGRESS NOTES
Patient transferred back to bed with assist x2, repositioned on her left side for comfort. Medicated for generalized pain (6/10) with tylenol,650mg po as per MD order. Will monitor.

## 2017-12-12 NOTE — PROGRESS NOTES
Patient transferred with assist x2 out of bed to bedside recliner per her request. Patient repositioned for comfort. Will monitor.

## 2017-12-12 NOTE — PROGRESS NOTES
Problem: Self Care Deficits Care Plan (Adult)  Goal: *Acute Goals and Plan of Care (Insert Text)  1. Patient will complete lower body bathing and dressing with minimal assistance and adaptive equipment as needed. 2. Patient will complete toileting with CGA. 3. Patient will tolerate 23 minutes of OT treatment with less than 3 rest breaks to increase activity tolerance for ADLs. 4. Patient will complete functional transfers with CGA and adaptive equipment as needed. Timeframe: 7 visits     Comments:     OCCUPATIONAL THERAPY: Initial Assessment and AM 12/12/2017  INPATIENT: Hospital Day: 7  [place] / [place] / Product Type: Medicare /      NAME/AGE/GENDER: Manav Oropeza is a 81 y.o. female   PRIMARY DIAGNOSIS:  Acute pulmonary embolism ([address] Acute pulmonary embolism ([address] Acute pulmonary embolism (HCC)        ICD-10: Treatment Diagnosis:    · Generalized Muscle Weakness (M62.81)  · Other lack of cordination (R27.8)   Precautions/Allergies:     Codeine      ASSESSMENT:     Ms. Yani Wilcox presents to hospital for above. Pt reports coming from [facility]. At baseline, she lives in a one-level home with her spouse and is typically independent to mod I with ADLs and uses a rollator for functional mobility. Pt reports wearing 2.5L supplemental O2 at home as needed. Today, she is AOX4 and agreeable to OT evaluation. Pt is able to scoot in chair with SBA and min to mod visual cues. She requires max A x2 for STS from lower surface (chair), she is able to standing for ~45 seconds before requesting to sit back down. Pt is functioning below her baseline and will require continued skilled OT services to maximize safety and independence with ADLs. Will follow during acute stay. This section established at most recent assessment   PROBLEM LIST (Impairments causing functional limitations):  1. Decreased Strength  2. Decreased ADL/Functional Activities  3.  Decreased Transfer Abilities  4. Decreased Ambulation Ability/Technique  5. Decreased Balance  6. Increased Pain  7. Decreased Activity Tolerance  8. Decreased Work Simplification/Energy Conservation Techniques  9. Increased Shortness of Breath  10. Edema/Girth  11. Decreased Skin Integrity/Hygeine  12. Decreased [place] with Home Exercise Program   INTERVENTIONS PLANNED: (Benefits and precautions of occupational therapy have been discussed with the patient.)  1. Activities of daily living training  2. Adaptive equipment training  3. Balance training  4. Clothing management  5. Donning&doffing training  6. Group therapy  7. Therapeutic activity  8. Therapeutic exercise     TREATMENT PLAN: Frequency/Duration: Follow patient 3x/week to address above goals. Rehabilitation Potential For Stated Goals: Fair     RECOMMENDED REHABILITATION/EQUIPMENT: (at time of discharge pending progress): Due to the probability of continued deficits (see above) this patient will likely need continued skilled occupational therapy after discharge. Equipment:    None at this time              OCCUPATIONAL PROFILE AND HISTORY:   History of Present Injury/Illness (Reason for Referral):  See H&P  Past Medical History/Comorbidities:   Ms. Albert Ravi  has a past medical history of Anemia of chronic disease; Arthritis; Chronic diastolic heart failure ([address] Chronic kidney disease; Chronic pain; Chronic respiratory failure ([address] Coagulation disorder ([address] Congestive heart failure, unspecified; Coronary atherosclerosis of native coronary vessel; CVA (cerebral vascular accident) ([address] (6/25/2014); Diabetes ([address] (1980's); Diabetic neuropathy ([address] DM (diabetes mellitus) ([address] (7/16/2014); End stage renal disease ([address] ESRD on hemodialysis ([address] (4/2014); Fibromyalgia; Gout; Heart failure ([address] Hemodialysis access site with mature fistula ([address] (8/27/14); Hemodialysis access, AV graft ([address] (9/9/2014); Hepatomegaly;  History of stroke (8/28/2014); Hypercholesterolemia; Hypothyroidism (approx 2000); Lupus; Morbid obesity ([facility] (6/21/16); NSVT (nonsustained ventricular tachycardia) ([facility] (6/19/2014); Orthostatic hypotension; CASSIE (obstructive sleep apnea) (6/21/16); Poor historian (6/21/16); Sepsis [facility] (6/19/2014); Stroke [facility]); Unable to bear weight (8/28/2014); Unspecified sleep apnea (11/13); Vertigo as late effect of stroke (6/21/16); and Weakness of both lower limbs (8/28/2014). She also has no past medical history of Coagulation defects or Nausea & vomiting. Ms. Jo Ann Landon  has a past surgical history that includes lap cholecystectomy (2009); hysterectomy (1975); vascular access (2013 and 4); vascular access (8/27/14); vascular access; vascular surgery procedure unlist (8-27-14); vascular surgery procedure unlist; gi; gi; and colonoscopy (N/A, 6/23/2016). Social History/Living Environment:   Home Environment: Private residence  # Steps to Enter: 1  One/Two Story Residence: One story  Living Alone: No  Support Systems: Spouse/Significant Other/Partner  Patient Expects to be Discharged to[de-identified] Rehabilitation facility  Current DME Used/Available at Home: Walker, rollator  Prior Level of Function/Work/Activity:  Independent to mod I with ADLs  Personal Factors:          Sex:  female        Age:  81 y.o. Number of Personal Factors/Comorbidities that affect the Plan of Care: Expanded review of therapy/medical records (1-2):  MODERATE COMPLEXITY   ASSESSMENT OF OCCUPATIONAL PERFORMANCE[de-identified]   Activities of Daily Living:           Basic ADLs (From Assessment) Complex ADLs (From Assessment)   Basic ADL  Feeding: Independent  Oral Facial Hygiene/Grooming: Minimum assistance  Bathing: Moderate assistance  Upper Body Dressing: Minimum assistance  Lower Body Dressing: Maximum assistance  Toileting: Moderate assistance Instrumental ADL  Meal Preparation: Maximum assistance  Homemaking:  Total assistance  Medication Management: Modified independent  Financial Management: Modified independent   Grooming/Bathing/Dressing Activities of Daily Living     Cognitive Retraining  Safety/Judgement: Awareness of environment;Home safety; Fall prevention                       Bed/Mat Mobility  Sit to Stand: Maximum assistance;Assist x2 (from chair)       Most Recent Physical Functioning:   Gross Assessment:  AROM: Generally decreased, functional  Strength: Generally decreased, functional  Coordination: Generally decreased, functional               Posture:  Posture (WDL): Exceptions to WDL  Posture Assessment: Forward head, Rounded shoulders  Balance:  Sitting: Intact; With support  Standing: Intact  Standing - Static: Fair  Standing - Dynamic : Poor Bed Mobility:     Wheelchair Mobility:     Transfers:  Sit to Stand: Maximum assistance;Assist x2 (from chair)  Stand to Sit: Moderate assistance                Patient Vitals for the past 6 hrs:   BP BP Patient Position SpO2 Pulse   17 0737 123/59 At rest 100 % 74   17 1152 118/47 - 100 % 63       Mental Status  Neurologic State: Alert  Orientation Level: Oriented X4  Cognition: Follows commands  Perception: Appears intact  Perseveration: No perseveration noted  Safety/Judgement: Awareness of environment, Home safety, Fall prevention                          Physical Skills Involved:  1. Balance  2. Strength  3. Activity Tolerance  4. Gross Motor Control  5. Edema  6. Skin Integrity Cognitive Skills Affected (resulting in the inability to perform in a timely and safe manner): 1. none Psychosocial Skills Affected:  1. Habits/Routines  2. Environmental Adaptation  3. Emotional Regulation   Number of elements that affect the Plan of Care: 5+:  HIGH COMPLEXITY   CLINICAL DECISION MAKIN [facility] [address] [id number] AM-PAC 6 Clicks   Daily Activity Inpatient Short Form  How much help from another person does the patient currently need. .. Total A Lot A Little None   1. Putting on and taking off regular lower body clothing?    [] 1   [x] 2   [] 3 [] 4   2. Bathing (including washing, rinsing, drying)? [] 1   [x] 2   [] 3   [] 4   3. Toileting, which includes using toilet, bedpan or urinal?   [] 1   [x] 2   [] 3   [] 4   4. Putting on and taking off regular upper body clothing? [] 1   [] 2   [] 3   [x] 4   5. Taking care of personal grooming such as brushing teeth? [] 1   [] 2   [] 3   [x] 4   6. Eating meals? [] 1   [] 2   [] 3   [x] 4   © 2007, Trustees of [address] [place] [address] [address] [id number], under license to [employer]. All rights reserved      Score:  Initial: 18 Most Recent: X (Date: -- )    Interpretation of Tool:  Represents activities that are increasingly more difficult (i.e. Bed mobility, Transfers, Gait). Score 24 23 22-20 19-15 14-10 9-7 6     Modifier CH CI CJ CK CL CM CN      ? Self Care:     - CURRENT STATUS: CK - 40%-59% impaired, limited or restricted    - GOAL STATUS: CJ - 20%-39% impaired, limited or restricted    - D/C STATUS:  ---------------To be determined---------------  [place] / [place] / Product Type: Medicare /      Medical Necessity:     · Patient is expected to demonstrate progress in strength, balance, coordination and functional technique to increase independence with ADLs. Reason for Services/Other Comments:  · Patient continues to require modification of therapeutic interventions to increase complexity of exercises.    Use of outcome tool(s) and clinical judgement create a POC that gives a: MODERATE COMPLEXITY         TREATMENT:   (In addition to Assessment/Re-Assessment sessions the following treatments were rendered)     Pre-treatment Symptoms/Complaints:    Pain: Initial:   Pain Intensity 1: 0  Post Session:  same     Assessment/Reassessment only, no treatment provided today    Braces/Orthotics/Lines/Etc:   · IV  · O2 Device: NC  Treatment/Session Assessment:    · Response to Treatment:  eval only   · Interdisciplinary Collaboration:   o Occupational Therapist  o Registered Nurse  o Rehabilitation Attendant  · After treatment position/precautions:   o Up in chair  o Bed alarm/tab alert on  o Bed/Chair-wheels locked  o Call light within reach  o RN notified  o Family at bedside   · Compliance with Program/Exercises: Will assess as treatment progresses. · Recommendations/Intent for next treatment session: \"Next visit will focus on advancements to more challenging activities and reduction in assistance provided\".   Total Treatment Duration:  OT Patient Time In/Time Out  Time In: 1102  Time Out: [place]

## 2017-12-12 NOTE — PROGRESS NOTES
Horne Cane  Admission Date: 12/6/2017             Daily Progress Note: 12/12/2017    The patient's chart is reviewed and the patient is discussed with the staff. The patient's chart is reviewed and the patient is discussed with the staff. Maggi johnson 71yo F who was admitted on 12/6 with ESRD, CHF 40%, DM, orhtostatic hypotension who presented from rehab with right leg wound and severe hypoxia and was found to have large bilateral pulmonary emboli. EKOS performed 12/8 with possible PFO noted. TTE with only mild right to left shunt. Subjective:     Patient seen sitting up in chair. On 3L. No new issues today. Started on coumadin yesterday.      Current Facility-Administered Medications   Medication Dose Route Frequency    sevelamer (RENAGEL) tablet 2,400 mg  2,400 mg Oral TID WITH MEALS    [START ON 12/13/2017] epoetin arlyn (EPOGEN;PROCRIT) injection 10,000 Units  10,000 Units IntraVENous DIALYSIS MON, WED & FRI    warfarin (COUMADIN) tablet 5 mg  5 mg Oral QPM    tuberculin injection 5 Units  5 Units IntraDERMal ONCE    ondansetron (ZOFRAN) injection 4 mg  4 mg IntraVENous Q6H PRN    heparin 25,000 units in dextrose 500 mL infusion  18-36 Units/kg/hr IntraVENous TITRATE    sodium chloride (NS) flush 5-10 mL  5-10 mL IntraVENous Q8H    sodium chloride (NS) flush 5-10 mL  5-10 mL IntraVENous PRN    fludrocortisone (FLORINEF) tablet 100 mcg  100 mcg Oral QHS    levothyroxine (SYNTHROID) tablet 175 mcg  175 mcg Oral ACB    pantoprazole (PROTONIX) tablet 40 mg  40 mg Oral DAILY    insulin regular (NOVOLIN R, HUMULIN R) injection   SubCUTAneous Q6H    acetaminophen (TYLENOL) tablet 650 mg  650 mg Oral Q4H PRN    zinc oxide-cod liver oil (DESITIN) 40 % paste   Topical BID    midodrine (PROAMITINE) tablet 5 mg  5 mg Oral TID WITH MEALS       Review of Systems  Constitutional: negative for fever, chills, sweats  Cardiovascular: negative for chest pain, palpitations, syncope, edema  Gastrointestinal: negative for dysphagia, reflux, vomiting, diarrhea, abdominal pain, or melena  Neurologic: negative for focal weakness, numbness, headache    Objective:     Vitals:    12/12/17 0443 12/12/17 0620 12/12/17 0737 12/12/17 1152   BP: 94/74  123/59 118/47   Pulse: 93  74 63   Resp: 18 18 18   Temp: 98.7 °F (37.1 °C)  98 °F (36.7 °C) 97.8 °F (36.6 °C)   SpO2: 93%  100% 100%   Weight:  198 lb 8 oz (90 kg)     Height:         Intake and Output:   12/10 1901 - 12/12 0700  In: 1947 [P.O.:60; I.V.:1887]  Out: 2500   12/12 0701 - 12/12 1900  In: 228 [P.O.:100; I.V.:128]  Out: -     Physical Exam:   Constitution:  the patient is well developed and in no acute distress  EENMT:  Sclera clear, pupils equal, oral mucosa moist  Respiratory: CTA B, no w/r/r  Cardiovascular:  RRR without M,G,R  Gastrointestinal: soft and non-tender; with positive bowel sounds. Musculoskeletal: warm without cyanosis. There is no lower leg edema. Skin:  no jaundice or rashes, no wounds   Neurologic: no gross neuro deficits     Psychiatric:  alert and oriented x ppt    CHEST XRAY:   No new imaging    LAB  No lab exists for component: [place]   Recent Labs      12/12/17   0650  12/11/17   1603   WBC  3.9*   --    HGB  8.1*   --    HCT  27.3*   --    PLT  129*   --    INR  1.3  1.0     Recent Labs      12/12/17   0650   NA  139   K  3.5   CL  98   CO2  29   GLU  104*   BUN  13   CREA  3.87*   CA  10.0   PHOS  4.2*   ALB  2.6*     No results for input(s): PH, PCO2, PO2, HCO3 in the last 72 hours.       Assessment:  (Medical Decision Making)     Hospital Problems  Date Reviewed: 10/18/2017          Codes Class Noted POA    Leg wound, right ICD-10-CM: S81.801A  ICD-9-CM: 891.0  12/7/2017 Unknown        * (Principal)Acute pulmonary embolism ([facility] ICD-10-CM: I26.99  ICD-9-CM: 415.19  12/6/2017 Yes        Acute respiratory failure ([facility] ICD-10-CM: J96.00  ICD-9-CM: 518.81  12/6/2017 Yes        Systolic CHF, chronic ([facility] (Chronic) ICD-10-CM: I50.22  ICD-9-CM: 428.22, 428.0  10/18/2017 Yes        Orthostatic hypotension ICD-10-CM: I95.1  ICD-9-CM: 458.0  Unknown Yes        ESRD on hemodialysis (HCC) (Chronic) ICD-10-CM: N18.6, Z99.2  ICD-9-CM: 585.6, V45.11  8/28/2014 Yes        CASSIE (obstructive sleep apnea) (Chronic) ICD-10-CM: G47.33  ICD-9-CM: 327.23  7/16/2014 Yes    Overview Signed 7/16/2014  1:18 AM by Deanna Obregon NP     BIPAP             DM (diabetes mellitus) ([facility] (Chronic) ICD-10-CM: E11.9  ICD-9-CM: 250.00  7/16/2014 Yes        Lupus (Chronic) ICD-10-CM: L93.0  ICD-9-CM: 695.4  6/19/2014 Yes        Morbid obesity ([facility] (Chronic) ICD-10-CM: E66.01  ICD-9-CM: 278.01  6/19/2014 Yes            Patient with B PE's s/p EKOS. Has been on heparin drip. Nephrology managing dialysis. Plan:  (Medical Decision Making)   -now on coumadin.  Continue heparin gtt until therapeutic INR.   -HD per nephrology.   -ask rt to continue weaning O2 to maintain sats >90-92%.   -case management to help with placement.   -cont florinef and HD  -bipap at night      Destiney Berrios MD

## 2017-12-12 NOTE — PROGRESS NOTES
Pt sitting up in chair. Heparin gtt infusing. Wound care provided to Rt lower leg. Dressing changed using xeroform, 4x4 and shama.  Door open

## 2017-12-13 LAB
APTT PPP: 139.1 SEC (ref 23.2–35.3)
APTT PPP: 163.5 SEC (ref 23.2–35.3)
APTT PPP: 76.8 SEC (ref 23.2–35.3)
APTT PPP: 91.5 SEC (ref 23.2–35.3)
ERYTHROCYTE [DISTWIDTH] IN BLOOD BY AUTOMATED COUNT: 16.7 % (ref 11.9–14.6)
GLUCOSE BLD STRIP.AUTO-MCNC: 103 MG/DL (ref 65–100)
GLUCOSE BLD STRIP.AUTO-MCNC: 118 MG/DL (ref 65–100)
GLUCOSE BLD STRIP.AUTO-MCNC: 122 MG/DL (ref 65–100)
GLUCOSE BLD STRIP.AUTO-MCNC: 94 MG/DL (ref 65–100)
HCT VFR BLD AUTO: 25.4 % (ref 35.8–46.3)
HGB BLD-MCNC: 7.6 G/DL (ref 11.7–15.4)
INR PPP: 1.4
MCH RBC QN AUTO: 32.5 PG (ref 26.1–32.9)
MCHC RBC AUTO-ENTMCNC: 29.9 G/DL (ref 31.4–35)
MCV RBC AUTO: 108.5 FL (ref 79.6–97.8)
PLATELET # BLD AUTO: 137 K/UL (ref 150–450)
PMV BLD AUTO: 9.3 FL (ref 10.8–14.1)
PROTHROMBIN TIME: 16.9 SEC (ref 11.5–14.5)
RBC # BLD AUTO: 2.34 M/UL (ref 4.05–5.25)
WBC # BLD AUTO: 4.4 K/UL (ref 4.3–11.1)

## 2017-12-13 PROCEDURE — 90935 HEMODIALYSIS ONE EVALUATION: CPT

## 2017-12-13 PROCEDURE — 85027 COMPLETE CBC AUTOMATED: CPT | Performed by: INTERNAL MEDICINE

## 2017-12-13 PROCEDURE — 85610 PROTHROMBIN TIME: CPT | Performed by: INTERNAL MEDICINE

## 2017-12-13 PROCEDURE — 85730 THROMBOPLASTIN TIME PARTIAL: CPT | Performed by: INTERNAL MEDICINE

## 2017-12-13 PROCEDURE — 74011250637 HC RX REV CODE- 250/637: Performed by: INTERNAL MEDICINE

## 2017-12-13 PROCEDURE — 74011250636 HC RX REV CODE- 250/636: Performed by: INTERNAL MEDICINE

## 2017-12-13 PROCEDURE — 82962 GLUCOSE BLOOD TEST: CPT

## 2017-12-13 PROCEDURE — 94660 CPAP INITIATION&MGMT: CPT

## 2017-12-13 PROCEDURE — 36415 COLL VENOUS BLD VENIPUNCTURE: CPT | Performed by: INTERNAL MEDICINE

## 2017-12-13 PROCEDURE — 99232 SBSQ HOSP IP/OBS MODERATE 35: CPT | Performed by: INTERNAL MEDICINE

## 2017-12-13 PROCEDURE — 94760 N-INVAS EAR/PLS OXIMETRY 1: CPT

## 2017-12-13 PROCEDURE — 65270000029 HC RM PRIVATE

## 2017-12-13 RX ADMIN — Medication: at 21:18

## 2017-12-13 RX ADMIN — MIDODRINE HYDROCHLORIDE 5 MG: 5 TABLET ORAL at 07:59

## 2017-12-13 RX ADMIN — Medication 10 ML: at 05:13

## 2017-12-13 RX ADMIN — Medication 5 ML: at 13:59

## 2017-12-13 RX ADMIN — RENAGEL 2400 MG: 400 TABLET ORAL at 17:10

## 2017-12-13 RX ADMIN — Medication: at 08:00

## 2017-12-13 RX ADMIN — FLUDROCORTISONE ACETATE 100 MCG: 0.1 TABLET ORAL at 21:17

## 2017-12-13 RX ADMIN — Medication 5 ML: at 21:18

## 2017-12-13 RX ADMIN — ONDANSETRON 4 MG: 2 INJECTION INTRAMUSCULAR; INTRAVENOUS at 07:59

## 2017-12-13 RX ADMIN — RENAGEL 2400 MG: 400 TABLET ORAL at 07:59

## 2017-12-13 RX ADMIN — ERYTHROPOIETIN 10000 UNITS: 10000 INJECTION, SOLUTION INTRAVENOUS; SUBCUTANEOUS at 10:08

## 2017-12-13 RX ADMIN — LEVOTHYROXINE SODIUM 175 MCG: 50 TABLET ORAL at 05:13

## 2017-12-13 RX ADMIN — HEPARIN SODIUM AND DEXTROSE 15 UNITS/KG/HR: 5000; 5 INJECTION INTRAVENOUS at 01:39

## 2017-12-13 RX ADMIN — MIDODRINE HYDROCHLORIDE 5 MG: 5 TABLET ORAL at 16:11

## 2017-12-13 RX ADMIN — WARFARIN SODIUM 5 MG: 2.5 TABLET ORAL at 17:10

## 2017-12-13 RX ADMIN — ONDANSETRON 4 MG: 2 INJECTION INTRAMUSCULAR; INTRAVENOUS at 16:11

## 2017-12-13 RX ADMIN — PANTOPRAZOLE SODIUM 40 MG: 40 TABLET, DELAYED RELEASE ORAL at 08:00

## 2017-12-13 RX ADMIN — RENAGEL 2400 MG: 400 TABLET ORAL at 13:57

## 2017-12-13 RX ADMIN — ACETAMINOPHEN 650 MG: 325 TABLET ORAL at 05:18

## 2017-12-13 NOTE — PROGRESS NOTES
Pt is up in room alert and oriented. rr are even and unlabored. Lung sounds are diminished. Pt has no c/o pain at current time. Pt has c/o nausea. Pt is stable no increased bleeding or bruising noted. Pt has dressing to rle clean dry and intact. Call light in reach. Safety measures in place. Will continue to monitor.

## 2017-12-13 NOTE — PROGRESS NOTES
Pt is up in room alert and oriented rr are even and unlabored. Pt has O2 @ 3L. O2 sat is 97% pt has no c/o pain. Pt does have a c/o nausea. zofran given with good results. Pt tolerated well. Pt lung sounds are diminished no distress noted. Abd is soft bowel sounds are active. Pt has no increased bleeding or bruising noted. Pt has Heparin running @ 15units. Call light in reach safety measures in place will continue to monitor.

## 2017-12-13 NOTE — PROGRESS NOTES
[facility] Progress Note    Follow-Up on: ESRD  Seen and examined on dialysis. Feels fair today. No acute events overnight. Complains of some right left edema    ROS:  Gen - no fever, no chills, appetite unchanged  CV - no chest pain, no palpitation  Lung - shortness of breath better, no cough  Abd - no tenderness, no nausea/vomiting, no diarrhea  Ext - no edema    Exam:  Vitals:    12/13/17 0408 12/13/17 0601 12/13/17 0744 12/13/17 0840   BP: 127/68  118/46 146/73   Pulse: 62  64 65   Resp: 15  16    Temp: 98.5 °F (36.9 °C)  97.5 °F (36.4 °C)    SpO2: 100%  100%    Weight:  92.9 kg (204 lb 12.8 oz)     Height:             Intake/Output Summary (Last 24 hours) at 12/13/17 0852  Last data filed at 12/13/17 0537   Gross per 24 hour   Intake              827 ml   Output                0 ml   Net              827 ml       Wt Readings from Last 3 Encounters:   12/13/17 92.9 kg (204 lb 12.8 oz)   09/22/17 94.8 kg (209 lb)   08/17/17 95.3 kg (210 lb)       GEN - in no distress, nasal cannula O2  CV - regular, no murmur, no rub  Lung - clear bilaterally  Abd - soft, nontender  Ext - +edema    Recent Labs      12/13/17   0729  12/12/17   0650  12/11/17   1603   WBC  4.4  3.9*   --    HGB  7.6*  8.1*   --    HCT  25.4*  27.3*   --    PLT  137*  129*   --    INR  1.4  1.3  1.0        Recent Labs      12/12/17   0650   NA  139   K  3.5   CL  98   CO2  29   BUN  13   CREA  3.87*   CA  10.0   GLU  104*   PHOS  4.2*       Assessment / Plan:  Principal Problem:    Acute pulmonary embolism ([facility] (12/6/2017)    Active Problems:    Lupus (6/19/2014)      Morbid obesity (HCC) (6/19/2014)      CASSIE (obstructive sleep apnea) (7/16/2014)      Overview: BIPAP      DM (diabetes mellitus) ([facility] (7/16/2014)      ESRD on hemodialysis ([facility] (8/28/2014)      Orthostatic hypotension ()      Systolic CHF, chronic ([facility] (10/18/2017)      [facility] (12/6/2017)      Leg wound, right (12/7/2017)      1.  ESRD - HD for UF and clearance  2. Large bilateral pulmonary embolisms - s/p thrombolysis 12/8 - moderate decrease in clot burden. Heparin and warfarin. 3. H/o CHF- EF 40%  4. Right leg wound  5. Anemia - multifactorial. Stable at 8.1. Resume RODRIGO  6. Orthostatic hypotension - on florinef and midodrine. bp stable  7. Edema left hand:  Will order a fistulagram to evaluate her access for stenosis

## 2017-12-13 NOTE — PROGRESS NOTES
Warfarin dosing per pharmacist    Margarito Schroeder is a 81 y.o. female. Height: 5' 3\" (160 cm)    Weight: 92.9 kg (204 lb 12.8 oz)    Indication:  Bilateral PE's, s/p clot burden reduction with EKOS    Goal INR:  2-3    Home dose:  New start    Risk factors or significant drug interactions:  Levothyroxine (appears euthyroid)    Other anticoagulants:  Heparin drip for bridge therapy while INR subtherapeutic    Daily Monitoring  Date  INR      Warfarin dose HGB              Notes  12/11  1   5 mg  ---    12/12  1.3   5 mg  8.1  12/13  1.4   5 mg    Pharmacy consulted to assist dosing warfarin in patient who presented with large bilateral PE's s/p EKOS on 12/8 for clot burden reduction. Will start with 5 mg nightly. INR is coming up - will continue current regimen. Would continue bridge therapy with heparin drip for at least 5 days and until INR therapeutic. Will continue to follow.     Thank you,  Karie Schwab, PharmD  Clinical Pharmacist  [phone]

## 2017-12-13 NOTE — PROGRESS NOTES
Duc Freire  Admission Date: 12/6/2017             Daily Progress Note: 12/13/2017    The patient's chart is reviewed and the patient is discussed with the staff. The patient's chart is reviewed and the patient is discussed with the staff. Antolin johnson 71yo F who was admitted on 12/6 with ESRD, CHF 40%, DM, orhtostatic hypotension who presented from rehab with right leg wound and severe hypoxia and was found to have large bilateral pulmonary emboli. EKOS performed 12/8 with possible PFO noted. TTE with only mild right to left shunt. Subjective:     Patient seen during HD. Still on 3L O2. Breathing stable. No new issues.     Current Facility-Administered Medications   Medication Dose Route Frequency    sevelamer (RENAGEL) tablet 2,400 mg  2,400 mg Oral TID WITH MEALS    epoetin arlyn (EPOGEN;PROCRIT) injection 10,000 Units  10,000 Units IntraVENous DIALYSIS MON, WED & FRI    warfarin (COUMADIN) tablet 5 mg  5 mg Oral QPM    ondansetron (ZOFRAN) injection 4 mg  4 mg IntraVENous Q6H PRN    heparin 25,000 units in dextrose 500 mL infusion  18-36 Units/kg/hr IntraVENous TITRATE    sodium chloride (NS) flush 5-10 mL  5-10 mL IntraVENous Q8H    sodium chloride (NS) flush 5-10 mL  5-10 mL IntraVENous PRN    fludrocortisone (FLORINEF) tablet 100 mcg  100 mcg Oral QHS    levothyroxine (SYNTHROID) tablet 175 mcg  175 mcg Oral ACB    pantoprazole (PROTONIX) tablet 40 mg  40 mg Oral DAILY    insulin regular (NOVOLIN R, HUMULIN R) injection   SubCUTAneous Q6H    acetaminophen (TYLENOL) tablet 650 mg  650 mg Oral Q4H PRN    zinc oxide-cod liver oil (DESITIN) 40 % paste   Topical BID    midodrine (PROAMITINE) tablet 5 mg  5 mg Oral TID WITH MEALS       Review of Systems  Constitutional: negative for fever, chills, sweats  Cardiovascular: negative for chest pain, palpitations, syncope, edema  Gastrointestinal: negative for dysphagia, reflux, vomiting, diarrhea, abdominal pain, or melena  Neurologic: negative for focal weakness, numbness, headache    Objective:     Vitals:    12/13/17 0930 12/13/17 1007 12/13/17 1030 12/13/17 1056   BP: 145/73 131/79 119/63 137/64   Pulse: 65 67 64 65   Resp:       Temp:       SpO2:       Weight:       Height:         Intake and Output:   12/11 1901 - 12/13 0700  In: 2814 [P.O.:218; I.V.:2596]  Out: -        Physical Exam:   Constitution:  the patient is well developed and in no acute distress  EENMT:  Sclera clear, pupils equal, oral mucosa moist  Respiratory: CTA B, no w/r/r  Cardiovascular:  RRR without M,G,R  Gastrointestinal: soft and non-tender; with positive bowel sounds. Musculoskeletal: warm without cyanosis. There is no lower leg edema. Skin:  no jaundice or rashes, no wounds   Neurologic: no gross neuro deficits     Psychiatric:  alert and oriented x ppt    CHEST XRAY:   No new imaging    LAB  No lab exists for component: [place]   Recent Labs      12/13/17   0729  12/12/17   0650  12/11/17   1603   WBC  4.4  3.9*   --    HGB  7.6*  8.1*   --    HCT  25.4*  27.3*   --    PLT  137*  129*   --    INR  1.4  1.3  1.0     Recent Labs      12/12/17   0650   NA  139   K  3.5   CL  98   CO2  29   GLU  104*   BUN  13   CREA  3.87*   CA  10.0   PHOS  4.2*   ALB  2.6*     No results for input(s): PH, PCO2, PO2, HCO3 in the last 72 hours.       Assessment:  (Medical Decision Making)     Hospital Problems  Date Reviewed: 10/18/2017          Codes Class Noted POA    Leg wound, right ICD-10-CM: S81.801A  ICD-9-CM: 891.0  12/7/2017 Unknown        * (Principal)Acute pulmonary embolism ([facility] ICD-10-CM: I26.99  ICD-9-CM: 415.19  12/6/2017 Yes        Acute respiratory failure ([facility] ICD-10-CM: J96.00  ICD-9-CM: 518.81  12/6/2017 Yes        Systolic CHF, chronic (HCC) (Chronic) ICD-10-CM: I50.22  ICD-9-CM: 428.22, 428.0  10/18/2017 Yes        Orthostatic hypotension ICD-10-CM: I95.1  ICD-9-CM: 458.0  Unknown Yes        ESRD on hemodialysis (HCC) (Chronic) ICD-10-CM: N18.6, Z99.2  ICD-9-CM: 585.6, V45.11  8/28/2014 Yes        CASSIE (obstructive sleep apnea) (Chronic) ICD-10-CM: G47.33  ICD-9-CM: 327.23  7/16/2014 Yes    Overview Signed 7/16/2014  1:18 AM by Jessica Shi NP     BIPAP             DM (diabetes mellitus) ([facility] (Chronic) ICD-10-CM: E11.9  ICD-9-CM: 250.00  7/16/2014 Yes        Lupus (Chronic) ICD-10-CM: L93.0  ICD-9-CM: 695.4  6/19/2014 Yes        Morbid obesity ([facility] (Chronic) ICD-10-CM: E66.01  ICD-9-CM: 278.01  6/19/2014 Yes            Patient with B PE's s/p EKOS. Has been on heparin drip. No started on coumadin. Nephrology managing dialysis. Plan:  (Medical Decision Making)   -now on coumadin. Continue heparin gtt until therapeutic INR (up to1.4 today). -HD per nephrology.    -weaning O2 as able.   -case management to help with placement.   -cont florinef for hypotension  -bipap at night      Yoni Segura MD

## 2017-12-13 NOTE — PROGRESS NOTES
Problem: Falls - Risk of  Goal: *Absence of Falls  Document Moy Fall Risk and appropriate interventions in the flowsheet.    Outcome: Progressing Towards Goal  Fall Risk Interventions:  Mobility Interventions: Bed/chair exit alarm, Patient to call before getting OOB         Medication Interventions: Bed/chair exit alarm, Patient to call before getting OOB    Elimination Interventions: Bed/chair exit alarm, Patient to call for help with toileting needs, Toileting schedule/hourly rounds    History of Falls Interventions: Bed/chair exit alarm, Door open when patient unattended

## 2017-12-13 NOTE — PROGRESS NOTES
Problem: Nutrition Deficit  Goal: *Optimize nutritional status  Nutrition LOS Note: Day 7  Assessment  Diet order(s): Cardiac, CCHO  Food,Nutrition, and Pertinent History: Patient presents with no acute nutrition risk factors identified by malnutrition screening tool upon admission. The patient is noted to have a h/o lupus, obesity, diabetes, ESRD on HD and CHF. The patient's appetite is doing well. The patient denies any po difficulties at this time and has no questions or concerns regarding nutrition at this time. Anthropometrics: Height: 5' 3\" (160 cm), Weight Source: Standing scale (comment), Weight: 92.9 kg (204 lb 12.8 oz), Body mass index is 36.28 kg/(m^2). BMI class of overweight for age >71. Macronutrient Needs:  · EER:  6114-6315 kcal /day (15-20 kcal/kg actual BW)  · EPR:  52-63 grams protein/day (1-1.2 grams/kg IBW)  Intake/Comparative Standards:  Average intake for past 4 recorded meal(s): 63%. This potentially meets ~90% of kcal and ~100% of protein needs    Nutrition Diagnosis: No nutrition diagnosis at this time    Intervention:   Meals and snacks: Continue current diet. Discharge nutrition plan: No discharge needs identified    [place].  Read [address], [address] [place] [address],    [phone]

## 2017-12-13 NOTE — PROGRESS NOTES
Patient received in bed sleeping, awakens with verbal command. Patient offers no complaints. Shift assessment completed, will monitor.

## 2017-12-13 NOTE — PROGRESS NOTES
Patient resting in bed, bipap removed, oxygen 3lpm via nasal cannula placed on patient. Patient repositioned in bed for comfort, will monitor.

## 2017-12-13 NOTE — DIALYSIS
HD treatment completed without complication. Total of 2.5 kgs removed. VS stable, NAD. Needles removed and pressure dressing applied to bilateral site. Transport contacted for return to room.

## 2017-12-14 LAB
APTT PPP: 106.4 SEC (ref 23.2–35.3)
ERYTHROCYTE [DISTWIDTH] IN BLOOD BY AUTOMATED COUNT: 16.9 % (ref 11.9–14.6)
GLUCOSE BLD STRIP.AUTO-MCNC: 107 MG/DL (ref 65–100)
GLUCOSE BLD STRIP.AUTO-MCNC: 110 MG/DL (ref 65–100)
GLUCOSE BLD STRIP.AUTO-MCNC: 130 MG/DL (ref 65–100)
GLUCOSE BLD STRIP.AUTO-MCNC: 167 MG/DL (ref 65–100)
HCT VFR BLD AUTO: 28.1 % (ref 35.8–46.3)
HGB BLD-MCNC: 8.5 G/DL (ref 11.7–15.4)
INR PPP: 1.6
MCH RBC QN AUTO: 33.3 PG (ref 26.1–32.9)
MCHC RBC AUTO-ENTMCNC: 30.2 G/DL (ref 31.4–35)
MCV RBC AUTO: 110.2 FL (ref 79.6–97.8)
PLATELET # BLD AUTO: 145 K/UL (ref 150–450)
PMV BLD AUTO: 9.1 FL (ref 10.8–14.1)
PROTHROMBIN TIME: 18.8 SEC (ref 11.5–14.5)
RBC # BLD AUTO: 2.55 M/UL (ref 4.05–5.25)
WBC # BLD AUTO: 5.2 K/UL (ref 4.3–11.1)

## 2017-12-14 PROCEDURE — 85730 THROMBOPLASTIN TIME PARTIAL: CPT | Performed by: INTERNAL MEDICINE

## 2017-12-14 PROCEDURE — 85027 COMPLETE CBC AUTOMATED: CPT | Performed by: INTERNAL MEDICINE

## 2017-12-14 PROCEDURE — 74011250636 HC RX REV CODE- 250/636: Performed by: INTERNAL MEDICINE

## 2017-12-14 PROCEDURE — 99232 SBSQ HOSP IP/OBS MODERATE 35: CPT | Performed by: INTERNAL MEDICINE

## 2017-12-14 PROCEDURE — 82962 GLUCOSE BLOOD TEST: CPT

## 2017-12-14 PROCEDURE — 74011250637 HC RX REV CODE- 250/637: Performed by: INTERNAL MEDICINE

## 2017-12-14 PROCEDURE — 85610 PROTHROMBIN TIME: CPT | Performed by: INTERNAL MEDICINE

## 2017-12-14 PROCEDURE — 36415 COLL VENOUS BLD VENIPUNCTURE: CPT | Performed by: INTERNAL MEDICINE

## 2017-12-14 PROCEDURE — 65270000029 HC RM PRIVATE

## 2017-12-14 RX ADMIN — PANTOPRAZOLE SODIUM 40 MG: 40 TABLET, DELAYED RELEASE ORAL at 08:02

## 2017-12-14 RX ADMIN — Medication 10 ML: at 16:26

## 2017-12-14 RX ADMIN — Medication 10 ML: at 05:32

## 2017-12-14 RX ADMIN — FLUDROCORTISONE ACETATE 100 MCG: 0.1 TABLET ORAL at 21:22

## 2017-12-14 RX ADMIN — RENAGEL 2400 MG: 400 TABLET ORAL at 08:02

## 2017-12-14 RX ADMIN — HEPARIN SODIUM AND DEXTROSE 12 UNITS/KG/HR: 5000; 5 INJECTION INTRAVENOUS at 19:43

## 2017-12-14 RX ADMIN — MIDODRINE HYDROCHLORIDE 5 MG: 5 TABLET ORAL at 12:22

## 2017-12-14 RX ADMIN — LEVOTHYROXINE SODIUM 175 MCG: 50 TABLET ORAL at 05:31

## 2017-12-14 RX ADMIN — MIDODRINE HYDROCHLORIDE 5 MG: 5 TABLET ORAL at 16:27

## 2017-12-14 RX ADMIN — ONDANSETRON 4 MG: 2 INJECTION INTRAMUSCULAR; INTRAVENOUS at 07:59

## 2017-12-14 RX ADMIN — Medication 5 ML: at 21:22

## 2017-12-14 RX ADMIN — ONDANSETRON 4 MG: 2 INJECTION INTRAMUSCULAR; INTRAVENOUS at 16:26

## 2017-12-14 RX ADMIN — Medication: at 08:02

## 2017-12-14 RX ADMIN — HEPARIN SODIUM AND DEXTROSE 12 UNITS/KG/HR: 5000; 5 INJECTION INTRAVENOUS at 01:13

## 2017-12-14 RX ADMIN — Medication: at 21:21

## 2017-12-14 RX ADMIN — WARFARIN SODIUM 5 MG: 2.5 TABLET ORAL at 16:28

## 2017-12-14 NOTE — PROGRESS NOTES
Pt in room alert and oriented rr are even and unlabored at current time. Pt is on 3L NC. No distress noted. Pt has no increased bleeding or bruising noted. Pt has a c/o nausea and has been given zofran 4mg. Pt has tolerated well. Pt stayed in the chair most of the day. There was a noticeable change in the pts affect when her family came to visit. Pt is stable. Safety measures in place will continue to monitor.

## 2017-12-14 NOTE — PROGRESS NOTES
Patient is alert and oriented X4, resting in bed quietly, on 2L n/c, RR even and unlabored, IV intact, RLE dressing clean dry and intact, no c/o pain or discomfort, no needs voiced @ this time, call light within reach.

## 2017-12-14 NOTE — PROGRESS NOTES
Patient has been resting quietly throughout the night, on 2L n/c, refused to wear BIPAP qhs, patient without c/o pain or discomfort, no needs voiced, call light within reach.

## 2017-12-14 NOTE — PROGRESS NOTES
Received call from chemistry,  Brittani Smith reports PTT 91.5, no change, lab value WNL, no change to heparin rate

## 2017-12-14 NOTE — PROGRESS NOTES
Pt is up in chair this am. rr are even and unlabored no c/o sob no c/o pain. Pt has O2 on @ 2L NC. Pt lung sounds are course. Pt has Heparin running @ 12units. No increased bleeding or bruising noted. Pt is in therapeutic range will continue to monitor. Pt abd is soft and bowel sounds are active. Call light in reach. Safety measures in place will continue to monitor.

## 2017-12-14 NOTE — PROGRESS NOTES
Problem: Falls - Risk of  Goal: *Absence of Falls  Document Moy Fall Risk and appropriate interventions in the flowsheet.    Outcome: Progressing Towards Goal  Fall Risk Interventions:  Mobility Interventions: Bed/chair exit alarm         Medication Interventions: Patient to call before getting OOB    Elimination Interventions: Call light in reach    History of Falls Interventions: Bed/chair exit alarm

## 2017-12-14 NOTE — PROGRESS NOTES
Problem: Mobility Impaired (Adult and Pediatric)  Goal: *Acute Goals and Plan of Care (Insert Text)  STG:  (1.)Ms. Rebeca Martinez will move from supine to sit and sit to supine , scoot up and down and roll side to side with MINIMAL ASSIST within 3 day(s). (2.)Ms. Rebeca Martinez will transfer from bed to chair and chair to bed with MINIMAL ASSIST using the least restrictive device within 3 day(s). (3.)Ms. Rebeca Martinez will ambulate with MINIMAL ASSIST for 15 feet with the least restrictive device within 2 day(s). (4.)Ms. Rebeca Martinez will perform LE exercises with 1 to 2 cues for form within 3 days to improve strength for functional transfers and ambulation. (5.)Ms. Rebeca Martinez will perform standing static and dynamic balance activities x 15+ minutes with MINIMAL ASSIST to improve safety within 3 day(s). LTG:  (1.)Ms. Rebeca Martinez will move from supine to sit and sit to supine , scoot up and down and roll side to side in bed with CONTACT GUARD ASSIST within 7 day(s). (2.)Ms. Rebeca Martinez will transfer from bed to chair and chair to bed with CONTACT GUARD ASSIST using the least restrictive device within 7 day(s). (3.)Ms. Rebeca Martinez will ambulate with CONTACT GUARD ASSIST for 30 feet with the least restrictive device within 7 day(s). (4.)Ms. Rebeca Martinez will perform standing static and dynamic balance activities x 25 minutes with CONTACT GUARD ASSIST to improve safety within 7 day(s). ________________________________________________________________________________________________     Patient was up in the chair having gotten there with nursing who said she did fairly well. She was very lethargic, reports feeling terrible and nauseated. She did a few seated exercises with me with her eyes closed much of the time. She did not want to try and stand and walk a little saying \"I know you need to do this but I just can't right now\". No charges for the little time I spent with her.   Will try again tomorrow but so far have had little luck getting her to participate.     Ginny Prieto PTA

## 2017-12-14 NOTE — PROGRESS NOTES
Warfarin dosing per pharmacist    Latonya Valdivia is a 81 y.o. female. Height: 5' 3\" (160 cm)    Weight: 86.2 kg (190 lb)    Indication:  Bilateral PE's, s/p clot burden reduction with EKOS    Goal INR:  2-3    Home dose:  New start    Risk factors or significant drug interactions:  Levothyroxine (appears euthyroid)    Other anticoagulants:  Heparin drip for bridge therapy while INR subtherapeutic    Daily Monitoring  Date  INR      Warfarin dose HGB              Notes  12/11  1   5 mg  ---    12/12  1.3   5 mg  8.1  12/13  1.4   5 mg  12/14  1.6   5mg  8.5    Pharmacy consulted to assist dosing warfarin in patient who presented with large bilateral PE's s/p EKOS on 12/8 for clot burden reduction. Will start with 5 mg nightly. INR is coming up - will continue current regimen. Would continue bridge therapy with heparin drip for at least 5 days and until INR therapeutic. Will continue to follow.     Thank you,  Simeon Das, PharmD

## 2017-12-14 NOTE — PROGRESS NOTES
Renee Brannon  Admission Date: 12/6/2017             Daily Progress Note: 12/14/2017    The patient's chart is reviewed and the patient is discussed with the staff. The patient's chart is reviewed and the patient is discussed with the staff. Marcos Rodriguez a 69yo F who was admitted on 12/6 with ESRD, CHF 40%, DM, orhtostatic hypotension who presented from rehab with right leg wound and severe hypoxia and was found to have large bilateral pulmonary emboli. EKOS performed 12/8 with possible PFO noted. TTE with only mild right to left shunt. Subjective:     Comfortable on NC. Says she uses it sometimes at rest at home. No cough or sputu. Affect is very dull.      Current Facility-Administered Medications   Medication Dose Route Frequency    sevelamer (RENAGEL) tablet 2,400 mg  2,400 mg Oral TID WITH MEALS    epoetin arlyn (EPOGEN;PROCRIT) injection 10,000 Units  10,000 Units IntraVENous DIALYSIS MON, WED & FRI    warfarin (COUMADIN) tablet 5 mg  5 mg Oral QPM    ondansetron (ZOFRAN) injection 4 mg  4 mg IntraVENous Q6H PRN    heparin 25,000 units in dextrose 500 mL infusion  18-36 Units/kg/hr IntraVENous TITRATE    sodium chloride (NS) flush 5-10 mL  5-10 mL IntraVENous Q8H    sodium chloride (NS) flush 5-10 mL  5-10 mL IntraVENous PRN    fludrocortisone (FLORINEF) tablet 100 mcg  100 mcg Oral QHS    levothyroxine (SYNTHROID) tablet 175 mcg  175 mcg Oral ACB    pantoprazole (PROTONIX) tablet 40 mg  40 mg Oral DAILY    insulin regular (NOVOLIN R, HUMULIN R) injection   SubCUTAneous Q6H    acetaminophen (TYLENOL) tablet 650 mg  650 mg Oral Q4H PRN    zinc oxide-cod liver oil (DESITIN) 40 % paste   Topical BID    midodrine (PROAMITINE) tablet 5 mg  5 mg Oral TID WITH MEALS       Review of Systems  Constitutional: negative for fever, chills, sweats  Cardiovascular: negative for chest pain, palpitations, syncope, edema  Gastrointestinal: negative for dysphagia, reflux, vomiting, diarrhea, abdominal pain, or melena  Neurologic: negative for focal weakness, numbness, headache    Objective:     Vitals:    12/13/17 2310 12/14/17 0353 12/14/17 0720 12/14/17 1123   BP: 128/71 121/69 129/70 123/54   Pulse: 72 65 74 65   Resp: 16 18 17 17   Temp: 98.6 °F (37 °C) 98.5 °F (36.9 °C) 98 °F (36.7 °C) 97.8 °F (36.6 °C)   SpO2: 98% 98% 93% 100%   Weight:  190 lb (86.2 kg)     Height:         Intake and Output:   12/12 1901 - 12/14 0700  In: 1089 [P.O.:238; I.V.:851]  Out: 2500   12/14 0701 - 12/14 1900  In: 118 [P.O.:118]  Out: -     Physical Exam:   Constitution:  the patient is well developed and in no acute distress; sat 100  EENMT:  Sclera clear, pupils equal, oral mucosa moist  Respiratory: CTA B  Cardiovascular:  RRR without M,G,R  Gastrointestinal: soft and non-tender; with positive bowel sounds. Musculoskeletal: warm without cyanosis. There is no lower leg edema. Skin:  no jaundice or rashes  Neurologic: no gross neuro deficits     Psychiatric:  alert and oriented x ppt    CHEST XRAY:   None today    CXR 12/10/17:        LAB  No lab exists for component: [place]   Recent Labs      12/14/17   0402  12/13/17   0729  12/12/17   0650   WBC  5.2  4.4  3.9*   HGB  8.5*  7.6*  8.1*   HCT  28.1*  25.4*  27.3*   PLT  145*  137*  129*   INR  1.6  1.4  1.3     Recent Labs      12/12/17   0650   NA  139   K  3.5   CL  98   CO2  29   GLU  104*   BUN  13   CREA  3.87*   CA  10.0   PHOS  4.2*   ALB  2.6*     No results for input(s): PH, PCO2, PO2, HCO3 in the last 72 hours.       Assessment:  (Medical Decision Making)     Hospital Problems  Date Reviewed: 10/18/2017          Codes Class Noted POA    Leg wound, right ICD-10-CM: S81.801A  ICD-9-CM: 891.0  12/7/2017 Unknown    Better    * (Principal)Acute pulmonary embolism ([facility] ICD-10-CM: I26.99  ICD-9-CM: 415.19  12/6/2017 Yes    Slow improvement; INR still subtherapeutic    Acute respiratory failure ([facility] ICD-10-CM: J96.00  ICD-9-CM: 518.81  12/6/2017 Yes    Resolved    Systolic CHF, chronic (HCC) (Chronic) ICD-10-CM: I50.22  ICD-9-CM: 428.22, 428.0  10/18/2017 Yes    Seems compensated    Orthostatic hypotension ICD-10-CM: I95.1  ICD-9-CM: 458.0  Unknown Yes    BP better    ESRD on hemodialysis (HCC) (Chronic) ICD-10-CM: N18.6, Z99.2  ICD-9-CM: 585.6, V45.11  8/28/2014 Yes    Per nephrology    CASSIE (obstructive sleep apnea) (Chronic) ICD-10-CM: D95.88  ICD-9-CM: 327.23  7/16/2014 Yes    Overview Signed 7/16/2014  1:18 AM by Iman Perea NP     BIPAP         Using BIPAP    DM (diabetes mellitus) ([facility] (Chronic) ICD-10-CM: E11.9  ICD-9-CM: 250.00  7/16/2014 Yes        Lupus (Chronic) ICD-10-CM: L93.0  ICD-9-CM: 695.4  6/19/2014 Yes        Morbid obesity ([facility] (Chronic) ICD-10-CM: E66.01  ICD-9-CM: 278.01  6/19/2014 Yes    Ongoing        Patient with B PE's s/p EKOS. Has been on heparin drip. No started on coumadin. Nephrology managing dialysis. Plan:  (Medical Decision Making)     --On Coumadin. Continue Heparin gtt until therapeutic INR 1.6 today again   --HD per nephrology. --weaning O2  --case management to help with placement. --BP better  --Wearing BIPAP with sleep.   --Check CXR; last one suggested infiltrate MAO Crowell MD

## 2017-12-14 NOTE — PROGRESS NOTES
[facility] Progress Note    Follow-Up on: ESRD    ROS:  Gen - no fever, no chills, appetite unchanged  CV - no chest pain, no palpitation  Lung - shortness of breath better, no cough  Abd - no tenderness, no nausea/vomiting, no diarrhea  Ext - right leg edema    Exam:  Vitals:    12/13/17 2310 12/14/17 0353 12/14/17 0720 12/14/17 1123   BP: 128/71 121/69 129/70 123/54   Pulse: 72 65 74 65   Resp: 16 18 17 17   Temp: 98.6 °F (37 °C) 98.5 °F (36.9 °C) 98 °F (36.7 °C) 97.8 °F (36.6 °C)   SpO2: 98% 98% 93% 100%   Weight:  86.2 kg (190 lb)     Height:             Intake/Output Summary (Last 24 hours) at 12/14/17 1332  Last data filed at 12/14/17 1001   Gross per 24 hour   Intake              388 ml   Output                0 ml   Net              388 ml       Wt Readings from Last 3 Encounters:   12/14/17 86.2 kg (190 lb)   09/22/17 94.8 kg (209 lb)   08/17/17 95.3 kg (210 lb)       GEN - in no distress, on Bipap  CV - regular, no murmur, no rub  Lung - clear bilaterally  Abd - soft, nontender  Ext - edema 1+ in RLE    Recent Labs      12/14/17   0402  12/13/17   0729  12/12/17   0650   WBC  5.2  4.4  3.9*   HGB  8.5*  7.6*  8.1*   HCT  28.1*  25.4*  27.3*   PLT  145*  137*  129*   INR  1.6  1.4  1.3        Recent Labs      12/12/17   0650   NA  139   K  3.5   CL  98   CO2  29   BUN  13   CREA  3.87*   CA  10.0   GLU  104*   PHOS  4.2*       Assessment / Plan:  Principal Problem:    Acute pulmonary embolism ([facility] (12/6/2017)    Active Problems:    Lupus (6/19/2014)      Morbid obesity ([facility] (6/19/2014)      CASSIE (obstructive sleep apnea) (7/16/2014)      Overview: BIPAP      DM (diabetes mellitus) ([facility] (7/16/2014)      [facility] ([facility] (8/28/2014)      Orthostatic hypotension ()      Systolic CHF, chronic ([facility] (10/18/2017)      Acute respiratory failure ([facility] (12/6/2017)      Leg wound, right (12/7/2017)      1.  ESRD - HD MWF  2. Pulmonary embolism - s/p thrombolysis - moderate decrease in clot burden  3. H/o CHF  4.  Leg wound

## 2017-12-15 LAB
APTT PPP: 186.3 SEC (ref 23.2–35.3)
APTT PPP: 68.6 SEC (ref 23.2–35.3)
GLUCOSE BLD STRIP.AUTO-MCNC: 102 MG/DL (ref 65–100)
GLUCOSE BLD STRIP.AUTO-MCNC: 103 MG/DL (ref 65–100)
GLUCOSE BLD STRIP.AUTO-MCNC: 109 MG/DL (ref 65–100)
GLUCOSE BLD STRIP.AUTO-MCNC: 134 MG/DL (ref 65–100)
INR PPP: 1.9
PROTHROMBIN TIME: 21.4 SEC (ref 11.5–14.5)

## 2017-12-15 PROCEDURE — 85610 PROTHROMBIN TIME: CPT | Performed by: INTERNAL MEDICINE

## 2017-12-15 PROCEDURE — 94660 CPAP INITIATION&MGMT: CPT

## 2017-12-15 PROCEDURE — 36415 COLL VENOUS BLD VENIPUNCTURE: CPT | Performed by: INTERNAL MEDICINE

## 2017-12-15 PROCEDURE — 74011250636 HC RX REV CODE- 250/636: Performed by: INTERNAL MEDICINE

## 2017-12-15 PROCEDURE — 74011250637 HC RX REV CODE- 250/637: Performed by: INTERNAL MEDICINE

## 2017-12-15 PROCEDURE — 85730 THROMBOPLASTIN TIME PARTIAL: CPT | Performed by: INTERNAL MEDICINE

## 2017-12-15 PROCEDURE — 90935 HEMODIALYSIS ONE EVALUATION: CPT

## 2017-12-15 PROCEDURE — 99232 SBSQ HOSP IP/OBS MODERATE 35: CPT | Performed by: INTERNAL MEDICINE

## 2017-12-15 PROCEDURE — 94760 N-INVAS EAR/PLS OXIMETRY 1: CPT

## 2017-12-15 PROCEDURE — 77030019605

## 2017-12-15 PROCEDURE — 65270000029 HC RM PRIVATE

## 2017-12-15 PROCEDURE — 82962 GLUCOSE BLOOD TEST: CPT

## 2017-12-15 RX ORDER — HEPARIN SODIUM 5000 [USP'U]/ML
35 INJECTION, SOLUTION INTRAVENOUS; SUBCUTANEOUS ONCE
Status: COMPLETED | OUTPATIENT
Start: 2017-12-15 | End: 2017-12-15

## 2017-12-15 RX ADMIN — Medication: at 12:18

## 2017-12-15 RX ADMIN — MIDODRINE HYDROCHLORIDE 5 MG: 5 TABLET ORAL at 16:50

## 2017-12-15 RX ADMIN — HEPARIN SODIUM 3200 UNITS: 5000 INJECTION, SOLUTION INTRAVENOUS; SUBCUTANEOUS at 15:27

## 2017-12-15 RX ADMIN — LEVOTHYROXINE SODIUM 175 MCG: 50 TABLET ORAL at 05:44

## 2017-12-15 RX ADMIN — Medication 10 ML: at 16:49

## 2017-12-15 RX ADMIN — Medication: at 21:32

## 2017-12-15 RX ADMIN — PANTOPRAZOLE SODIUM 40 MG: 40 TABLET, DELAYED RELEASE ORAL at 12:16

## 2017-12-15 RX ADMIN — Medication 5 ML: at 21:31

## 2017-12-15 RX ADMIN — ONDANSETRON 4 MG: 2 INJECTION INTRAMUSCULAR; INTRAVENOUS at 21:37

## 2017-12-15 RX ADMIN — Medication 10 ML: at 05:44

## 2017-12-15 RX ADMIN — HEPARIN SODIUM AND DEXTROSE 9 UNITS/KG/HR: 5000; 5 INJECTION INTRAVENOUS at 15:24

## 2017-12-15 RX ADMIN — FLUDROCORTISONE ACETATE 100 MCG: 0.1 TABLET ORAL at 21:31

## 2017-12-15 RX ADMIN — ERYTHROPOIETIN 10000 UNITS: 10000 INJECTION, SOLUTION INTRAVENOUS; SUBCUTANEOUS at 10:52

## 2017-12-15 RX ADMIN — WARFARIN SODIUM 5 MG: 2.5 TABLET ORAL at 16:50

## 2017-12-15 RX ADMIN — HEPARIN SODIUM AND DEXTROSE 11 UNITS/KG/HR: 5000; 5 INJECTION INTRAVENOUS at 19:08

## 2017-12-15 NOTE — PROGRESS NOTES
Pt returned from Dialysis. rr are even and unlabored. No c/o sob. No increased bleeding or bruising noted. Will continue to monitor.

## 2017-12-15 NOTE — PROGRESS NOTES
Assessment completed, see doc flowsheet. Pt sitting at the bedside. No distress. O2 in place at 2L N/C. Denies pain. States nausea is clearing. Flat affect when trying to converse with pt. Heparin drip infusing as ordered via pump. Pt aware to call for needs or complaints.

## 2017-12-15 NOTE — PROGRESS NOTES
Problem: Falls - Risk of  Goal: *Absence of Falls  Document Moy Fall Risk and appropriate interventions in the flowsheet.    Outcome: Progressing Towards Goal  Fall Risk Interventions:  Mobility Interventions: Patient to call before getting OOB         Medication Interventions: Patient to call before getting OOB    Elimination Interventions: Call light in reach    History of Falls Interventions: Bed/chair exit alarm

## 2017-12-15 NOTE — PROGRESS NOTES
OT note:  Pt declined treatment this afternoon stating she felt sick after HD. Will re-attempt at a later date/time.   Leonardo Ayala

## 2017-12-15 NOTE — PROGRESS NOTES
Pt is up in room alert and oriented. rr are even an unlabored. Pt has a poor appetite and has refused food today. Pt has been encouraged to eat on several different occasions. Pt has been encouraged to drink also. She has agreed to drink in small amounts several times today. Pt has refused some meds this pm also. Pt is stable at current time. No s/s of increase bleeding or bruising noted. Call light in reach will continue to monitor.

## 2017-12-15 NOTE — PROGRESS NOTES
Pt is in bed resting. rr are even and unlabored. Pt is on 3l NC. Pt lung sounds are course. No distress noted. Pt has Heparin running @ 9units. No increased bleeding or bruising noted. Pt has swelling to left arm. Thrill felt bruit heard. Pt abd is soft bowel sounds are active. Pt is stable. Call light in reach will continue to monitor.

## 2017-12-15 NOTE — PROGRESS NOTES
Subjective:   Daily Progress Note: 12/15/2017 10:23 AM    No complaints    Current Facility-Administered Medications   Medication Dose Route Frequency    sevelamer (RENAGEL) tablet 2,400 mg  2,400 mg Oral TID WITH MEALS    epoetin arlyn (EPOGEN;PROCRIT) injection 10,000 Units  10,000 Units IntraVENous DIALYSIS MON, WED & FRI    warfarin (COUMADIN) tablet 5 mg  5 mg Oral QPM    ondansetron (ZOFRAN) injection 4 mg  4 mg IntraVENous Q6H PRN    heparin 25,000 units in dextrose 500 mL infusion  18-36 Units/kg/hr IntraVENous TITRATE    sodium chloride (NS) flush 5-10 mL  5-10 mL IntraVENous Q8H    sodium chloride (NS) flush 5-10 mL  5-10 mL IntraVENous PRN    fludrocortisone (FLORINEF) tablet 100 mcg  100 mcg Oral QHS    levothyroxine (SYNTHROID) tablet 175 mcg  175 mcg Oral ACB    pantoprazole (PROTONIX) tablet 40 mg  40 mg Oral DAILY    insulin regular (NOVOLIN R, HUMULIN R) injection   SubCUTAneous Q6H    acetaminophen (TYLENOL) tablet 650 mg  650 mg Oral Q4H PRN    zinc oxide-cod liver oil (DESITIN) 40 % paste   Topical BID    midodrine (PROAMITINE) tablet 5 mg  5 mg Oral TID WITH MEALS               Objective:     Visit Vitals    /76    Pulse 67    Temp 98.3 °F (36.8 °C)    Resp 15    Ht 5' 3\" (1.6 m)    Wt 90.9 kg (200 lb 8 oz)    SpO2 98%    Breastfeeding No    BMI 35.52 kg/m2    O2 Flow Rate (L/min): 3 l/min (decreased from 4) O2 Device: BIPAP    Temp (24hrs), Av.3 °F (36.8 °C), Min:97.8 °F (36.6 °C), Max:98.7 °F (37.1 °C)          1901 - 12/15 0700  In: 606 [P.O.:336; I.V.:270]  Out: -       Visit Vitals    /76    Pulse 67    Temp 98.3 °F (36.8 °C)    Resp 15    Ht 5' 3\" (1.6 m)    Wt 90.9 kg (200 lb 8 oz)    SpO2 98%    Breastfeeding No    BMI 35.52 kg/m2     Head: Normocephalic, without obvious abnormality  Neck: no JVD  Lungs: clear to auscultation bilaterally  Heart: regular rate and rhythm  Abdomen: soft, non-tender.   Extremities:+ edema          Data Review    Recent Results (from the past 48 hour(s))   GLUCOSE, POC    Collection Time: 12/13/17 10:43 AM   Result Value Ref Range    Glucose (POC) 94 65 - 100 mg/dL   PTT    Collection Time: 12/13/17  2:58 PM   Result Value Ref Range    aPTT 76.8 (H) 23.2 - 35.3 SEC   GLUCOSE, POC    Collection Time: 12/13/17  5:13 PM   Result Value Ref Range    Glucose (POC) 118 (H) 65 - 100 mg/dL   PTT    Collection Time: 12/13/17  9:04 PM   Result Value Ref Range    aPTT 91.5 (HH) 23.2 - 35.3 SEC   GLUCOSE, POC    Collection Time: 12/14/17 12:17 AM   Result Value Ref Range    Glucose (POC) 107 (H) 65 - 100 mg/dL   CBC W/O DIFF    Collection Time: 12/14/17  4:02 AM   Result Value Ref Range    WBC 5.2 4.3 - 11.1 K/uL    RBC 2.55 (L) 4.05 - 5.25 M/uL    HGB 8.5 (L) 11.7 - 15.4 g/dL    HCT 28.1 (L) 35.8 - 46.3 %    .2 (H) 79.6 - 97.8 FL    MCH 33.3 (H) 26.1 - 32.9 PG    MCHC 30.2 (L) 31.4 - 35.0 g/dL    RDW 16.9 (H) 11.9 - 14.6 %    PLATELET 121 (L) 751 - 450 K/uL    MPV 9.1 (L) 10.8 - 14.1 FL   PROTHROMBIN TIME + INR    Collection Time: 12/14/17  4:02 AM   Result Value Ref Range    Prothrombin time 18.8 (H) 11.5 - 14.5 sec    INR 1.6     PTT    Collection Time: 12/14/17  4:02 AM   Result Value Ref Range    aPTT 106.4 (HH) 23.2 - 35.3 SEC   GLUCOSE, POC    Collection Time: 12/14/17  5:48 AM   Result Value Ref Range    Glucose (POC) 110 (H) 65 - 100 mg/dL   GLUCOSE, POC    Collection Time: 12/14/17 11:25 AM   Result Value Ref Range    Glucose (POC) 167 (H) 65 - 100 mg/dL   GLUCOSE, POC    Collection Time: 12/14/17  4:26 PM   Result Value Ref Range    Glucose (POC) 130 (H) 65 - 100 mg/dL   GLUCOSE, POC    Collection Time: 12/15/17 12:25 AM   Result Value Ref Range    Glucose (POC) 103 (H) 65 - 100 mg/dL   PROTHROMBIN TIME + INR    Collection Time: 12/15/17  4:10 AM   Result Value Ref Range    Prothrombin time 21.4 (H) 11.5 - 14.5 sec    INR 1.9     PTT    Collection Time: 12/15/17  4:10 AM   Result Value Ref Range    aPTT 186.3 (HH) 23.2 - 35.3 SEC   GLUCOSE, POC    Collection Time: 12/15/17  5:31 AM   Result Value Ref Range    Glucose (POC) 102 (H) 65 - 100 mg/dL       Assessment     Patient Active Problem List    Diagnosis Date Noted    Leg wound, right 12/07/2017    Acute pulmonary embolism ([address] 12/06/2017    Acute respiratory failure ([address] 88/67/4583    Systolic CHF, chronic ([address] 10/18/2017    ESRD (end stage renal disease) on dialysis ([address] 07/20/2017    Pulmonary edema 06/30/2017    Orthostatic hypotension     Chronic respiratory failure (HCC)     Chronic diastolic heart failure (HCC)     HTN (hypertension), ESSENTIAL     Hyperlipidemia     Coronary atherosclerosis of native coronary vessel     Hemodialysis access, [facility] graft ([facility] 09/09/2014    Unable to bear weight 08/28/2014    ESRD on hemodialysis ([facility] 08/28/2014    Weakness of both lower limbs 08/28/2014    Anemia of chronic disease 08/28/2014    History of stroke 08/28/2014    CASSIE (obstructive sleep apnea) 07/16/2014    DM (diabetes mellitus) ([facility] 07/16/2014    Hypothyroidism 06/25/2014    GERD (gastroesophageal reflux disease) 06/19/2014    Lupus 06/19/2014    Morbid obesity ([address] 06/19/2014           Problems Addressed by Nephrology     ESRD  Swollen access arm    Plan     Seen on dialysis, well tolerated. I have arranged for outpatient fistulagram next week.

## 2017-12-15 NOTE — PROGRESS NOTES
A str bed will be available at [facility] on Monday if pt is medically stable for dc. BALAJI called [facility] to inform them that pt will likely be medically stable for dc on Monday. SW following.

## 2017-12-15 NOTE — DIALYSIS
Hemodialysis treatment initiated using left forearm AVG and 15 g needles by Latha Johnson RN. Pt alert and VS stable. Machine settings per MD order. Will monitor during treatment.

## 2017-12-15 NOTE — PROGRESS NOTES
Warfarin dosing per pharmacist    Raffaele Lopez is a 81 y.o. female. Height: 5' 3\" (160 cm)    Weight: 90.9 kg (200 lb 8 oz)    Indication:  Bilateral PE's, s/p clot burden reduction with EKOS    Goal INR:  2-3    Home dose:  New start    Risk factors or significant drug interactions:  Levothyroxine    Other anticoagulants:  Heparin drip for bridge therapy while INR subtherapeutic    Daily Monitoring  Date  INR      Warfarin dose HGB              Notes  12/11  1   5 mg  ---    12/12  1.3   5 mg  8.1  12/13  1.4   5 mg  12/14  1.6   5mg  8.5  12/15  1.9   5 mg  ---         Pharmacy consulted to assist dosing warfarin in patient who presented with large bilateral PE's s/p EKOS on 12/8 for clot burden reduction. Will start with 5 mg nightly. INR trending up - will continue current regimen. Continue bridge with heparin until INR therapeutic X2. Will continue to follow. Thank you,  Sherine Brown.  Mark Luna, PharmD  Clinical Pharmacist  [phone]

## 2017-12-15 NOTE — PROGRESS NOTES
Hemodialysis treatment completed. Removed 2.5 kg. Removed two 15 gauge needles, secured sites with gauze and tape, no bleeding visible. VS stable. SBAR complete, see DOC flow sheet. Pt awaiting transfer to room via transport.

## 2017-12-15 NOTE — PROGRESS NOTES
Pt resting without complaints. Reassessed without changes. No distress. Denies pain, needs, nausea or complaints.

## 2017-12-15 NOTE — PROGRESS NOTES
Attempted PT treatment after patient returned from HD. Patient and family present in the room and patient actively sick at her stomach and not feeling like should could participate in therapy at this time. Will check back as schedule allows.     ELEN WebberT

## 2017-12-15 NOTE — PROGRESS NOTES
Pt in room alert and oriented. rr are even an unlabored. Lung sounds are course with some wheezing. Pt has stated she is nauseous but did not want any meds at this time. Family at bedside. No c/o sob no distress noted. Call light in reach. Will continue to monitor. Safety measures in place.

## 2017-12-15 NOTE — PROGRESS NOTES
Latonya Valdivia  Admission Date: 12/6/2017             Daily Progress Note: 12/15/2017    The patient's chart is reviewed and the patient is discussed with the staff. The patient's chart is reviewed and the patient is discussed with the staff. Raven Giordano a 71yo F who was admitted on 12/6 with ESRD, CHF 40%, DM, orhtostatic hypotension who presented from rehab with right leg wound and severe hypoxia and was found to have large bilateral pulmonary emboli. EKOS performed 12/8 with possible PFO noted. TTE with only mild right to left shunt. Subjective:     Patient seen during HD. On O2 and stable. INR up to 1.9 today.      Current Facility-Administered Medications   Medication Dose Route Frequency    sevelamer (RENAGEL) tablet 2,400 mg  2,400 mg Oral TID WITH MEALS    epoetin arlyn (EPOGEN;PROCRIT) injection 10,000 Units  10,000 Units IntraVENous DIALYSIS MON, WED & FRI    warfarin (COUMADIN) tablet 5 mg  5 mg Oral QPM    ondansetron (ZOFRAN) injection 4 mg  4 mg IntraVENous Q6H PRN    heparin 25,000 units in dextrose 500 mL infusion  18-36 Units/kg/hr IntraVENous TITRATE    sodium chloride (NS) flush 5-10 mL  5-10 mL IntraVENous Q8H    sodium chloride (NS) flush 5-10 mL  5-10 mL IntraVENous PRN    fludrocortisone (FLORINEF) tablet 100 mcg  100 mcg Oral QHS    levothyroxine (SYNTHROID) tablet 175 mcg  175 mcg Oral ACB    pantoprazole (PROTONIX) tablet 40 mg  40 mg Oral DAILY    insulin regular (NOVOLIN R, HUMULIN R) injection   SubCUTAneous Q6H    acetaminophen (TYLENOL) tablet 650 mg  650 mg Oral Q4H PRN    zinc oxide-cod liver oil (DESITIN) 40 % paste   Topical BID    midodrine (PROAMITINE) tablet 5 mg  5 mg Oral TID WITH MEALS       Review of Systems  Constitutional: negative for fever, chills, sweats  Cardiovascular: negative for chest pain, palpitations, syncope, edema  Gastrointestinal: negative for dysphagia, reflux, vomiting, diarrhea, abdominal pain, or melena  Neurologic: negative for focal weakness, numbness, headache    Objective:     Vitals:    12/15/17 0834 12/15/17 0857 12/15/17 0924 12/15/17 0952   BP: 155/68 129/61 171/80 162/76   Pulse: 60 (!) 51 67 67   Resp:       Temp:       SpO2:       Weight:       Height:         Intake and Output:   12/13 1901 - 12/15 0700  In: 606 [P.O.:336; I.V.:270]  Out: -        Physical Exam:   Constitution:  the patient is well developed and in no acute distress  EENMT:  Sclera clear, pupils equal, oral mucosa moist  Respiratory: CTA B, no w/r/r  Cardiovascular:  RRR without M,G,R  Gastrointestinal: soft and non-tender; with positive bowel sounds. Musculoskeletal: warm without cyanosis. There is no lower leg edema. Skin:  no jaundice or rashes, no wounds   Neurologic: no gross neuro deficits     Psychiatric:  alert and oriented x ppt    CHEST XRAY:   No new imaging    LAB  No lab exists for component: [place]   Recent Labs      12/15/17   0410  12/14/17   0402  12/13/17   0729   WBC   --   5.2  4.4   HGB   --   8.5*  7.6*   HCT   --   28.1*  25.4*   PLT   --   145*  137*   INR  1.9  1.6  1.4     No results for input(s): NA, K, CL, CO2, GLU, BUN, CREA, MG, CA, PHOS, ALB, TBIL, GPT, SGOT, BNPP in the last 72 hours. No lab exists for component: TROIP  No results for input(s): PH, PCO2, PO2, HCO3 in the last 72 hours.       Assessment:  (Medical Decision Making)     Hospital Problems  Date Reviewed: 10/18/2017          Codes Class Noted POA    Leg wound, right ICD-10-CM: S81.801A  ICD-9-CM: 891.0  12/7/2017 Unknown        * (Principal)Acute pulmonary embolism ([facility] ICD-10-CM: I26.99  ICD-9-CM: 415.19  12/6/2017 Yes        Acute respiratory failure ([facility] ICD-10-CM: J96.00  ICD-9-CM: 518.81  12/6/2017 Yes        Systolic CHF, chronic (HCC) (Chronic) ICD-10-CM: I50.22  ICD-9-CM: 428.22, 428.0  10/18/2017 Yes        Orthostatic hypotension ICD-10-CM: I95.1  ICD-9-CM: 458.0  Unknown Yes        ESRD on hemodialysis (HCC) (Chronic) ICD-10-CM: N18.6, Z99.2  ICD-9-CM: 585.6, V45.11  8/28/2014 Yes        CASSIE (obstructive sleep apnea) (Chronic) ICD-10-CM: G47.33  ICD-9-CM: 327.23  7/16/2014 Yes    Overview Signed 7/16/2014  1:18 AM by Kimberlee Martin NP     BIPAP             DM (diabetes mellitus) ([facility] (Chronic) ICD-10-CM: E11.9  ICD-9-CM: 250.00  7/16/2014 Yes        Lupus (Chronic) ICD-10-CM: L93.0  ICD-9-CM: 695.4  6/19/2014 Yes        Morbid obesity ([facility] (Chronic) ICD-10-CM: E66.01  ICD-9-CM: 278.01  6/19/2014 Yes            Patient with B PE's s/p EKOS. Has been on heparin drip. Now started on coumadin and nearing therapeutic level. Nephrology managing dialysis. Plan:  (Medical Decision Making)   -now on coumadin. Continue heparin gtt until therapeutic INR (up to1.9 today). -HD per nephrology. -weaning O2 as able.   -case management to help with placement: going back to [facility] when ready. Likely early this coming week.    -cont florinef for hypotension  -bipap at night      Alexandria Calderon MD

## 2017-12-16 LAB
APTT PPP: 106.8 SEC (ref 23.2–35.3)
APTT PPP: 115.5 SEC (ref 23.2–35.3)
APTT PPP: 146.9 SEC (ref 23.2–35.3)
GLUCOSE BLD STRIP.AUTO-MCNC: 105 MG/DL (ref 65–100)
GLUCOSE BLD STRIP.AUTO-MCNC: 105 MG/DL (ref 65–100)
GLUCOSE BLD STRIP.AUTO-MCNC: 148 MG/DL (ref 65–100)
GLUCOSE BLD STRIP.AUTO-MCNC: 158 MG/DL (ref 65–100)
INR PPP: 2
PROTHROMBIN TIME: 22.3 SEC (ref 11.5–14.5)

## 2017-12-16 PROCEDURE — 74011636637 HC RX REV CODE- 636/637: Performed by: INTERNAL MEDICINE

## 2017-12-16 PROCEDURE — 74011250637 HC RX REV CODE- 250/637: Performed by: INTERNAL MEDICINE

## 2017-12-16 PROCEDURE — 65270000029 HC RM PRIVATE

## 2017-12-16 PROCEDURE — 74011250636 HC RX REV CODE- 250/636: Performed by: INTERNAL MEDICINE

## 2017-12-16 PROCEDURE — 36415 COLL VENOUS BLD VENIPUNCTURE: CPT | Performed by: INTERNAL MEDICINE

## 2017-12-16 PROCEDURE — 82962 GLUCOSE BLOOD TEST: CPT

## 2017-12-16 PROCEDURE — 85610 PROTHROMBIN TIME: CPT | Performed by: INTERNAL MEDICINE

## 2017-12-16 PROCEDURE — 85730 THROMBOPLASTIN TIME PARTIAL: CPT | Performed by: INTERNAL MEDICINE

## 2017-12-16 PROCEDURE — 99232 SBSQ HOSP IP/OBS MODERATE 35: CPT | Performed by: INTERNAL MEDICINE

## 2017-12-16 RX ADMIN — PANTOPRAZOLE SODIUM 40 MG: 40 TABLET, DELAYED RELEASE ORAL at 08:52

## 2017-12-16 RX ADMIN — Medication 10 ML: at 21:36

## 2017-12-16 RX ADMIN — FLUDROCORTISONE ACETATE 100 MCG: 0.1 TABLET ORAL at 21:36

## 2017-12-16 RX ADMIN — MIDODRINE HYDROCHLORIDE 5 MG: 5 TABLET ORAL at 08:52

## 2017-12-16 RX ADMIN — HEPARIN SODIUM AND DEXTROSE 11 UNITS/KG/HR: 5000; 5 INJECTION INTRAVENOUS at 07:31

## 2017-12-16 RX ADMIN — Medication 5 ML: at 06:14

## 2017-12-16 RX ADMIN — MIDODRINE HYDROCHLORIDE 5 MG: 5 TABLET ORAL at 17:04

## 2017-12-16 RX ADMIN — ONDANSETRON 4 MG: 2 INJECTION INTRAMUSCULAR; INTRAVENOUS at 16:55

## 2017-12-16 RX ADMIN — HUMAN INSULIN 2 UNITS: 100 INJECTION, SOLUTION SUBCUTANEOUS at 12:00

## 2017-12-16 RX ADMIN — Medication: at 08:52

## 2017-12-16 RX ADMIN — LEVOTHYROXINE SODIUM 175 MCG: 50 TABLET ORAL at 06:15

## 2017-12-16 RX ADMIN — ONDANSETRON 4 MG: 2 INJECTION INTRAMUSCULAR; INTRAVENOUS at 23:55

## 2017-12-16 RX ADMIN — HEPARIN SODIUM AND DEXTROSE 9 UNITS/KG/HR: 5000; 5 INJECTION INTRAVENOUS at 17:02

## 2017-12-16 RX ADMIN — MIDODRINE HYDROCHLORIDE 5 MG: 5 TABLET ORAL at 12:51

## 2017-12-16 RX ADMIN — WARFARIN SODIUM 5 MG: 2.5 TABLET ORAL at 17:04

## 2017-12-16 RX ADMIN — Medication: at 21:37

## 2017-12-16 NOTE — PROGRESS NOTES
Subjective:   Daily Progress Note: 2017 10:01 AM    No complaints    Current Facility-Administered Medications   Medication Dose Route Frequency    sevelamer (RENAGEL) tablet 2,400 mg  2,400 mg Oral TID WITH MEALS    epoetin arlyn (EPOGEN;PROCRIT) injection 10,000 Units  10,000 Units IntraVENous DIALYSIS MON, WED & FRI    warfarin (COUMADIN) tablet 5 mg  5 mg Oral QPM    ondansetron (ZOFRAN) injection 4 mg  4 mg IntraVENous Q6H PRN    heparin 25,000 units in dextrose 500 mL infusion  18-36 Units/kg/hr IntraVENous TITRATE    sodium chloride (NS) flush 5-10 mL  5-10 mL IntraVENous Q8H    sodium chloride (NS) flush 5-10 mL  5-10 mL IntraVENous PRN    fludrocortisone (FLORINEF) tablet 100 mcg  100 mcg Oral QHS    levothyroxine (SYNTHROID) tablet 175 mcg  175 mcg Oral ACB    pantoprazole (PROTONIX) tablet 40 mg  40 mg Oral DAILY    insulin regular (NOVOLIN R, HUMULIN R) injection   SubCUTAneous Q6H    acetaminophen (TYLENOL) tablet 650 mg  650 mg Oral Q4H PRN    zinc oxide-cod liver oil (DESITIN) 40 % paste   Topical BID    midodrine (PROAMITINE) tablet 5 mg  5 mg Oral TID WITH MEALS               Objective:     Visit Vitals    /76 (BP 1 Location: Right arm, BP Patient Position: At rest)    Pulse 70    Temp 98.9 °F (37.2 °C)    Resp 14    Ht 5' 3\" (1.6 m)    Wt 88.9 kg (195 lb 14.4 oz)    SpO2 100%    Breastfeeding No    BMI 34.7 kg/m2    O2 Flow Rate (L/min): 3 l/min (decreased from 4) O2 Device: BIPAP    Temp (24hrs), Av.3 °F (36.8 °C), Min:98 °F (36.7 °C), Max:98.9 °F (37.2 °C)          1901 -  0700  In: 170 [P.O.:170]  Out: 2500       Visit Vitals    /76 (BP 1 Location: Right arm, BP Patient Position: At rest)    Pulse 70    Temp 98.9 °F (37.2 °C)    Resp 14    Ht 5' 3\" (1.6 m)    Wt 88.9 kg (195 lb 14.4 oz)    SpO2 100%    Breastfeeding No    BMI 34.7 kg/m2     Head: Normocephalic, without obvious abnormality  Neck: no JVD  Lungs: clear to auscultation bilaterally  Heart: regular rate and rhythm  Abdomen: soft, non-tender.   Extremities: + edema          Data Review    Recent Results (from the past 48 hour(s))   GLUCOSE, POC    Collection Time: 12/14/17 11:25 AM   Result Value Ref Range    Glucose (POC) 167 (H) 65 - 100 mg/dL   GLUCOSE, POC    Collection Time: 12/14/17  4:26 PM   Result Value Ref Range    Glucose (POC) 130 (H) 65 - 100 mg/dL   GLUCOSE, POC    Collection Time: 12/15/17 12:25 AM   Result Value Ref Range    Glucose (POC) 103 (H) 65 - 100 mg/dL   PROTHROMBIN TIME + INR    Collection Time: 12/15/17  4:10 AM   Result Value Ref Range    Prothrombin time 21.4 (H) 11.5 - 14.5 sec    INR 1.9     PTT    Collection Time: 12/15/17  4:10 AM   Result Value Ref Range    aPTT 186.3 (HH) 23.2 - 35.3 SEC   GLUCOSE, POC    Collection Time: 12/15/17  5:31 AM   Result Value Ref Range    Glucose (POC) 102 (H) 65 - 100 mg/dL   GLUCOSE, POC    Collection Time: 12/15/17 10:55 AM   Result Value Ref Range    Glucose (POC) 134 (H) 65 - 100 mg/dL   PTT    Collection Time: 12/15/17  1:49 PM   Result Value Ref Range    aPTT 68.6 (H) 23.2 - 35.3 SEC   GLUCOSE, POC    Collection Time: 12/15/17  5:02 PM   Result Value Ref Range    Glucose (POC) 109 (H) 65 - 100 mg/dL   PTT    Collection Time: 12/16/17 12:58 AM   Result Value Ref Range    aPTT 106.8 (HH) 23.2 - 35.3 SEC   PROTHROMBIN TIME + INR    Collection Time: 12/16/17 12:58 AM   Result Value Ref Range    Prothrombin time 22.3 (H) 11.5 - 14.5 sec    INR 2.0     GLUCOSE, POC    Collection Time: 12/16/17  1:46 AM   Result Value Ref Range    Glucose (POC) 105 (H) 65 - 100 mg/dL   GLUCOSE, POC    Collection Time: 12/16/17  6:00 AM   Result Value Ref Range    Glucose (POC) 105 (H) 65 - 100 mg/dL   PTT    Collection Time: 12/16/17  9:16 AM   Result Value Ref Range    aPTT 115.5 (HH) 23.2 - 35.3 SEC       Assessment     Patient Active Problem List    Diagnosis Date Noted    Leg wound, right 12/07/2017    Acute pulmonary embolism ([facility] 12/06/2017    Acute respiratory failure ([address] [facility] 89/68/8135    Systolic CHF, chronic ([address] 10/18/2017    ESRD (end stage renal disease) on dialysis ([address] 07/20/2017    Pulmonary edema 06/30/2017    Orthostatic hypotension     Chronic respiratory failure (HCC)     Chronic diastolic heart failure (HCC)     HTN (hypertension), ESSENTIAL     Hyperlipidemia     Coronary atherosclerosis of native coronary vessel     [facility] 09/09/2014    Unable to bear weight 08/28/2014    ESRD on hemodialysis ([address] 08/28/2014    Weakness of both lower limbs 08/28/2014    Anemia of chronic disease 08/28/2014    History of stroke 08/28/2014    CASSIE (obstructive sleep apnea) 07/16/2014    DM (diabetes mellitus) [facility] 07/16/2014    Hypothyroidism 06/25/2014    GERD (gastroesophageal reflux disease) 06/19/2014    Lupus 06/19/2014    Morbid obesity ([facility] 06/19/2014           Problems Addressed by Nephrology     ESRD  Swollen access arm    Plan     Dialysis Monday. Outpatient gordou;bryce has been arranged. Anticipate discharge Monday to [facility].

## 2017-12-16 NOTE — PROGRESS NOTES
Patient is alert and oriented X4, resting quietly in bed, RR even and unlabored, patient does not communicate much, denies pain and discomfort, Heparin drip infusing, rate verified with RN, no needs voiced, call light within reach.

## 2017-12-16 NOTE — PROGRESS NOTES
Warfarin dosing per pharmacist    Willie Olivia is a 81 y.o. female. Height: 5' 3\" (160 cm)    Weight: 88.9 kg (195 lb 14.4 oz)    Indication:  Bilateral PE's, s/p clot burden reduction with EKOS    Goal INR:  2-3    Home dose:  New start    Risk factors or significant drug interactions:  Levothyroxine    Other anticoagulants:  Heparin drip for bridge therapy while INR subtherapeutic    Daily Monitoring  Date  INR      Warfarin dose HGB              Notes  12/11  1   5 mg  ---    12/12  1.3   5 mg  8.1  12/13  1.4   5 mg  12/14  1.6   5 mg  8.5  12/15  1.9   5 mg  ---  12/16  2   5 mg  ---         Pharmacy consulted to assist dosing warfarin in patient who presented with large bilateral PE's s/p EKOS on 12/8 for clot burden reduction. Continue warfarin 5 mg. INR is just therapeutic today at 2. Continuing heparin gtt for one day overlap. Pharmacy will continue to follow. Please call with any questions.     Thank you,  Anil Sen, PharmD  Clinical Pharmacist  [phone]

## 2017-12-16 NOTE — PROGRESS NOTES
Spoke with lab, request them to draw PTT that was to be drawn @ 2130, they state that they were informed she had PICC line, I informed them @ this time patient does not have PICC line and requested for them to draw PTT @ this time.

## 2017-12-16 NOTE — PROGRESS NOTES
Patient resting quietly throughout night, BIPAP in place, patient without c/o pain or discomfort, no needs voiced, call light within reach.

## 2017-12-16 NOTE — PROGRESS NOTES
Latonya Valdivia  Admission Date: 12/6/2017             Daily Progress Note: 12/16/2017    The patient's chart is reviewed and the patient is discussed with the staff. The patient's chart is reviewed and the patient is discussed with the staff. Raven Giordano a 69yo F who was admitted on 12/6 with ESRD, CHF 40%, DM, orhtostatic hypotension who presented from rehab with right leg wound and severe hypoxia and was found to have large bilateral pulmonary emboli. EKOS performed 12/8 with possible PFO noted. TTE with only mild right to left shunt. Subjective:     Patient seen sitting up in chair. Currently on 2L O2. INR up to 2 today. No new issues.      Current Facility-Administered Medications   Medication Dose Route Frequency    sevelamer (RENAGEL) tablet 2,400 mg  2,400 mg Oral TID WITH MEALS    epoetin arlyn (EPOGEN;PROCRIT) injection 10,000 Units  10,000 Units IntraVENous DIALYSIS MON, WED & FRI    warfarin (COUMADIN) tablet 5 mg  5 mg Oral QPM    ondansetron (ZOFRAN) injection 4 mg  4 mg IntraVENous Q6H PRN    heparin 25,000 units in dextrose 500 mL infusion  18-36 Units/kg/hr IntraVENous TITRATE    sodium chloride (NS) flush 5-10 mL  5-10 mL IntraVENous Q8H    sodium chloride (NS) flush 5-10 mL  5-10 mL IntraVENous PRN    fludrocortisone (FLORINEF) tablet 100 mcg  100 mcg Oral QHS    levothyroxine (SYNTHROID) tablet 175 mcg  175 mcg Oral ACB    pantoprazole (PROTONIX) tablet 40 mg  40 mg Oral DAILY    insulin regular (NOVOLIN R, HUMULIN R) injection   SubCUTAneous Q6H    acetaminophen (TYLENOL) tablet 650 mg  650 mg Oral Q4H PRN    zinc oxide-cod liver oil (DESITIN) 40 % paste   Topical BID    midodrine (PROAMITINE) tablet 5 mg  5 mg Oral TID WITH MEALS       Review of Systems  Constitutional: negative for fever, chills, sweats  Cardiovascular: negative for chest pain, palpitations, syncope, edema  Gastrointestinal: negative for dysphagia, reflux, vomiting, diarrhea, abdominal pain, or melena  Neurologic: negative for focal weakness, numbness, headache    Objective:     Vitals:    12/15/17 2357 12/16/17 0405 12/16/17 0700 12/16/17 0745   BP: 127/76 120/60  134/76   Pulse: 64 62  70   Resp: 18 18 14   Temp: 98.2 °F (36.8 °C) 98 °F (36.7 °C)  98.9 °F (37.2 °C)   SpO2: 100% 100%  100%   Weight:   195 lb 14.4 oz (88.9 kg)    Height:         Intake and Output:   12/14 1901 - 12/16 0700  In: 170 [P.O.:170]  Out: 2500        Physical Exam:   Constitution:  the patient is well developed and in no acute distress  EENMT:  Sclera clear, pupils equal, oral mucosa moist  Respiratory: CTA B, no w/r/r  Cardiovascular:  RRR without M,G,R  Gastrointestinal: soft and non-tender; with positive bowel sounds. Musculoskeletal: warm without cyanosis. There is no lower leg edema. Skin:  no jaundice or rashes, no wounds   Neurologic: no gross neuro deficits     Psychiatric:  alert and oriented x ppt    CHEST XRAY:   No new imaging    LAB  No lab exists for component: [place]   Recent Labs      12/16/17   0058  12/15/17   0410  12/14/17   0402   WBC   --    --   5.2   HGB   --    --   8.5*   HCT   --    --   28.1*   PLT   --    --   145*   INR  2.0  1.9  1.6     No results for input(s): NA, K, CL, CO2, GLU, BUN, CREA, MG, CA, PHOS, ALB, TBIL, GPT, SGOT, BNPP in the last 72 hours. No lab exists for component: TROIP  No results for input(s): PH, PCO2, PO2, HCO3 in the last 72 hours.       Assessment:  (Medical Decision Making)     Hospital Problems  Date Reviewed: 10/18/2017          Codes Class Noted POA    Leg wound, right ICD-10-CM: S81.801A  ICD-9-CM: 891.0  12/7/2017 Unknown        * (Principal)Acute pulmonary embolism ([facility] [facility] ICD-10-CM: I26.99  ICD-9-CM: 415.19  12/6/2017 Yes        Acute respiratory failure ([facility] ICD-10-CM: J96.00  ICD-9-CM: 518.81  12/6/2017 Yes        Systolic CHF, chronic (HCC) (Chronic) ICD-10-CM: I50.22  ICD-9-CM: 428.22, 428.0  10/18/2017 Yes        Orthostatic hypotension ICD-10-CM: I95.1  ICD-9-CM: 458.0  Unknown Yes        ESRD on hemodialysis ([facility] (Chronic) ICD-10-CM: N18.6, Z99.2  ICD-9-CM: 585.6, V45.11  8/28/2014 Yes        CASSIE (obstructive sleep apnea) (Chronic) ICD-10-CM: G47.33  ICD-9-CM: 327.23  7/16/2014 Yes    Overview Signed 7/16/2014  1:18 AM by Ramón Benson NP     BIPAP             DM (diabetes mellitus) ([facility] [address] (Chronic) ICD-10-CM: E11.9  ICD-9-CM: 250.00  7/16/2014 Yes        Lupus (Chronic) ICD-10-CM: L93.0  ICD-9-CM: 695.4  6/19/2014 Yes        Morbid obesity ([facility] (Chronic) ICD-10-CM: E66.01  ICD-9-CM: 278.01  6/19/2014 Yes            Patient with B PE's s/p EKOS. Has been on heparin drip. Now started on coumadin and reached therapeutic level 12/16. Nephrology managing dialysis. Plan:  (Medical Decision Making)   -now on coumadin. INR 2 today. Overlap today and tomorrow. -HD per nephrology. outpt fistulogram next week. -weaning O2 as able.   -case management to help with placement: going back to [facility] [place] when ready.  Likely on Monday  -cont florinef for hypotension  -bipap at night      Jack Diaz MD

## 2017-12-16 NOTE — PROGRESS NOTES
Up in chair for period of time today, appetite remains poor, pt more receptive to communication of staff today, will continue to monitor for needs/pain

## 2017-12-17 LAB
APTT PPP: 84.3 SEC (ref 23.2–35.3)
APTT PPP: 96 SEC (ref 23.2–35.3)
GLUCOSE BLD STRIP.AUTO-MCNC: 106 MG/DL (ref 65–100)
GLUCOSE BLD STRIP.AUTO-MCNC: 124 MG/DL (ref 65–100)
GLUCOSE BLD STRIP.AUTO-MCNC: 126 MG/DL (ref 65–100)
GLUCOSE BLD STRIP.AUTO-MCNC: 127 MG/DL (ref 65–100)
GLUCOSE BLD STRIP.AUTO-MCNC: 162 MG/DL (ref 65–100)
INR PPP: 2
PROTHROMBIN TIME: 22.4 SEC (ref 11.5–14.5)

## 2017-12-17 PROCEDURE — 85610 PROTHROMBIN TIME: CPT | Performed by: INTERNAL MEDICINE

## 2017-12-17 PROCEDURE — 74011250636 HC RX REV CODE- 250/636: Performed by: INTERNAL MEDICINE

## 2017-12-17 PROCEDURE — 94660 CPAP INITIATION&MGMT: CPT

## 2017-12-17 PROCEDURE — 99232 SBSQ HOSP IP/OBS MODERATE 35: CPT | Performed by: INTERNAL MEDICINE

## 2017-12-17 PROCEDURE — 82962 GLUCOSE BLOOD TEST: CPT

## 2017-12-17 PROCEDURE — 36415 COLL VENOUS BLD VENIPUNCTURE: CPT | Performed by: INTERNAL MEDICINE

## 2017-12-17 PROCEDURE — 85730 THROMBOPLASTIN TIME PARTIAL: CPT | Performed by: INTERNAL MEDICINE

## 2017-12-17 PROCEDURE — 74011250637 HC RX REV CODE- 250/637: Performed by: INTERNAL MEDICINE

## 2017-12-17 PROCEDURE — 65270000029 HC RM PRIVATE

## 2017-12-17 PROCEDURE — 74011636637 HC RX REV CODE- 636/637: Performed by: INTERNAL MEDICINE

## 2017-12-17 RX ADMIN — Medication 10 ML: at 06:00

## 2017-12-17 RX ADMIN — Medication: at 09:48

## 2017-12-17 RX ADMIN — Medication 10 ML: at 21:15

## 2017-12-17 RX ADMIN — ONDANSETRON 4 MG: 2 INJECTION INTRAMUSCULAR; INTRAVENOUS at 21:31

## 2017-12-17 RX ADMIN — Medication 5 ML: at 14:00

## 2017-12-17 RX ADMIN — MIDODRINE HYDROCHLORIDE 5 MG: 5 TABLET ORAL at 18:02

## 2017-12-17 RX ADMIN — Medication: at 21:14

## 2017-12-17 RX ADMIN — MIDODRINE HYDROCHLORIDE 5 MG: 5 TABLET ORAL at 08:00

## 2017-12-17 RX ADMIN — HUMAN INSULIN 2 UNITS: 100 INJECTION, SOLUTION SUBCUTANEOUS at 12:00

## 2017-12-17 RX ADMIN — RENAGEL 2400 MG: 400 TABLET ORAL at 12:09

## 2017-12-17 RX ADMIN — ONDANSETRON 4 MG: 2 INJECTION INTRAMUSCULAR; INTRAVENOUS at 14:49

## 2017-12-17 RX ADMIN — MIDODRINE HYDROCHLORIDE 5 MG: 5 TABLET ORAL at 12:09

## 2017-12-17 RX ADMIN — HEPARIN SODIUM AND DEXTROSE 9 UNITS/KG/HR: 5000; 5 INJECTION INTRAVENOUS at 07:05

## 2017-12-17 RX ADMIN — LEVOTHYROXINE SODIUM 175 MCG: 50 TABLET ORAL at 07:30

## 2017-12-17 RX ADMIN — PANTOPRAZOLE SODIUM 40 MG: 40 TABLET, DELAYED RELEASE ORAL at 09:41

## 2017-12-17 RX ADMIN — FLUDROCORTISONE ACETATE 100 MCG: 0.1 TABLET ORAL at 21:14

## 2017-12-17 RX ADMIN — WARFARIN SODIUM 5 MG: 2.5 TABLET ORAL at 18:03

## 2017-12-17 NOTE — PROGRESS NOTES
PTT resulting=  84.3       No changes made R=16.7/9U-kg-hr, will order 24hr PTT ck at this time for 0600 12-18-17

## 2017-12-17 NOTE — PROGRESS NOTES
Elizabeth Chow  Admission Date: 12/6/2017             Daily Progress Note: 12/17/2017    The patient's chart is reviewed and the patient is discussed with the staff. The patient's chart is reviewed and the patient is discussed with the staff. Matthew Son a 71yo F who was admitted on 12/6 with ESRD, CHF 40%, DM, orhtostatic hypotension who presented from rehab with right leg wound and severe hypoxia and was found to have large bilateral pulmonary emboli. EKOS performed 12/8 with possible PFO noted. TTE with only mild right to left shunt. Subjective:     Patient seen Dawood Horowitz in bed. On 2L O2. No new complaints. INR 2 for the second time today.      Current Facility-Administered Medications   Medication Dose Route Frequency    sevelamer (RENAGEL) tablet 2,400 mg  2,400 mg Oral TID WITH MEALS    epoetin arlyn (EPOGEN;PROCRIT) injection 10,000 Units  10,000 Units IntraVENous DIALYSIS MON, WED & FRI    warfarin (COUMADIN) tablet 5 mg  5 mg Oral QPM    ondansetron (ZOFRAN) injection 4 mg  4 mg IntraVENous Q6H PRN    heparin 25,000 units in dextrose 500 mL infusion  18-36 Units/kg/hr IntraVENous TITRATE    sodium chloride (NS) flush 5-10 mL  5-10 mL IntraVENous Q8H    sodium chloride (NS) flush 5-10 mL  5-10 mL IntraVENous PRN    fludrocortisone (FLORINEF) tablet 100 mcg  100 mcg Oral QHS    levothyroxine (SYNTHROID) tablet 175 mcg  175 mcg Oral ACB    pantoprazole (PROTONIX) tablet 40 mg  40 mg Oral DAILY    insulin regular (NOVOLIN R, HUMULIN R) injection   SubCUTAneous Q6H    acetaminophen (TYLENOL) tablet 650 mg  650 mg Oral Q4H PRN    zinc oxide-cod liver oil (DESITIN) 40 % paste   Topical BID    midodrine (PROAMITINE) tablet 5 mg  5 mg Oral TID WITH MEALS       Review of Systems  Constitutional: negative for fever, chills, sweats  Cardiovascular: negative for chest pain, palpitations, syncope, edema  Gastrointestinal: negative for dysphagia, reflux, vomiting, diarrhea, abdominal pain, or melena  Neurologic: negative for focal weakness, numbness, headache    Objective:     Vitals:    12/16/17 1910 12/16/17 2319 12/17/17 0445 12/17/17 0731   BP: 121/73 127/48 125/72 116/50   Pulse: 65 62 61 63   Resp: 16 16 16 18   Temp: 98.7 °F (37.1 °C) 97.4 °F (36.3 °C) 98 °F (36.7 °C) 97.9 °F (36.6 °C)   SpO2: 98% 100% 100% 100%   Weight:       Height:         Intake and Output:   12/15 1901 - 12/17 0700  In: 80 [P.O.:80]  Out: -   12/17 0701 - 12/17 1900  In: 120 [P.O.:120]  Out: -     Physical Exam:   Constitution:  the patient is well developed and in no acute distress  EENMT:  Sclera clear, pupils equal, oral mucosa moist  Respiratory: CTA B, no w/r/r  Cardiovascular:  RRR without M,G,R  Gastrointestinal: soft and non-tender; with positive bowel sounds. Musculoskeletal: warm without cyanosis. There is no lower leg edema. Skin:  no jaundice or rashes, no wounds   Neurologic: no gross neuro deficits     Psychiatric:  alert and oriented x ppt    CHEST XRAY:   No new imaging    LAB  No lab exists for component: [place]   Recent Labs      12/17/17   0655  12/16/17   0058  12/15/17   0410   INR  2.0  2.0  1.9     No results for input(s): NA, K, CL, CO2, GLU, BUN, CREA, MG, CA, PHOS, ALB, TBIL, GPT, SGOT, BNPP in the last 72 hours. No lab exists for component: TROIP  No results for input(s): PH, PCO2, PO2, HCO3 in the last 72 hours.       Assessment:  (Medical Decision Making)     Hospital Problems  Date Reviewed: 10/18/2017          Codes Class Noted POA    Leg wound, right ICD-10-CM: S81.801A  ICD-9-CM: 891.0  12/7/2017 Unknown        * (Principal)Acute pulmonary embolism ([facility] [address] ICD-10-CM: I26.99  ICD-9-CM: 415.19  12/6/2017 Yes        Acute respiratory failure ([facility] [address] ICD-10-CM: J96.00  ICD-9-CM: 518.81  12/6/2017 Yes        Systolic CHF, chronic (HCC) (Chronic) ICD-10-CM: I50.22  ICD-9-CM: 428.22, 428.0  10/18/2017 Yes        Orthostatic hypotension ICD-10-CM: I95.1  ICD-9-CM: 458.0  Unknown Yes ESRD on hemodialysis [facility]) (Chronic) ICD-10-CM: N18.6, Z99.2  ICD-9-CM: 585.6, V45.11  8/28/2014 Yes        CASSIE (obstructive sleep apnea) (Chronic) ICD-10-CM: G47.33  ICD-9-CM: 327.23  7/16/2014 Yes    Overview Signed 7/16/2014  1:18 AM by Isidor Hatchet, NP     BIPAP             DM (diabetes mellitus) ([facility] [address] (Chronic) ICD-10-CM: E11.9  ICD-9-CM: 250.00  7/16/2014 Yes        Lupus (Chronic) ICD-10-CM: L93.0  ICD-9-CM: 695.4  6/19/2014 Yes        Morbid obesity ([facility] [address] (Chronic) ICD-10-CM: E66.01  ICD-9-CM: 278.01  6/19/2014 Yes            Patient with B PE's s/p EKOS. Has been on heparin drip. Now started on coumadin and reached therapeutic level 12/16. Nephrology managing dialysis. Plan:  (Medical Decision Making)   -now on coumadin. INR 2 x 2 days. Overlap today and can d/c tomorrow. -HD per nephrology. outpt fistulogram next week. -weaning O2 as able.   -case management to help with placement: going back to [facility] [place] when ready.  Likely on Monday  -cont florinef for hypotension  -bipap at night      Priyanka Greco MD

## 2017-12-17 NOTE — PROGRESS NOTES
Subjective:   Daily Progress Note: 2017 10:07 AM    No complaints    Current Facility-Administered Medications   Medication Dose Route Frequency    sevelamer (RENAGEL) tablet 2,400 mg  2,400 mg Oral TID WITH MEALS    epoetin arlyn (EPOGEN;PROCRIT) injection 10,000 Units  10,000 Units IntraVENous DIALYSIS MON, WED & FRI    warfarin (COUMADIN) tablet 5 mg  5 mg Oral QPM    ondansetron (ZOFRAN) injection 4 mg  4 mg IntraVENous Q6H PRN    heparin 25,000 units in dextrose 500 mL infusion  18-36 Units/kg/hr IntraVENous TITRATE    sodium chloride (NS) flush 5-10 mL  5-10 mL IntraVENous Q8H    sodium chloride (NS) flush 5-10 mL  5-10 mL IntraVENous PRN    fludrocortisone (FLORINEF) tablet 100 mcg  100 mcg Oral QHS    levothyroxine (SYNTHROID) tablet 175 mcg  175 mcg Oral ACB    pantoprazole (PROTONIX) tablet 40 mg  40 mg Oral DAILY    insulin regular (NOVOLIN R, HUMULIN R) injection   SubCUTAneous Q6H    acetaminophen (TYLENOL) tablet 650 mg  650 mg Oral Q4H PRN    zinc oxide-cod liver oil (DESITIN) 40 % paste   Topical BID    midodrine (PROAMITINE) tablet 5 mg  5 mg Oral TID WITH MEALS               Objective:     Visit Vitals    /50 (BP 1 Location: Right arm, BP Patient Position: At rest)    Pulse 63    Temp 97.9 °F (36.6 °C)    Resp 18    Ht 5' 3\" (1.6 m)    Wt 88.9 kg (195 lb 14.4 oz)    SpO2 100%    Breastfeeding No    BMI 34.7 kg/m2    O2 Flow Rate (L/min): 3 l/min (decreased from 4) O2 Device: BIPAP    Temp (24hrs), Av.2 °F (36.8 °C), Min:97.4 °F (36.3 °C), Max:98.9 °F (37.2 °C)      701 - 1900  In: 120 [P.O.:120]  Out: -   12/15 1901 -  07  In: 80 [P.O.:80]  Out: -       Visit Vitals    /50 (BP 1 Location: Right arm, BP Patient Position: At rest)    Pulse 63    Temp 97.9 °F (36.6 °C)    Resp 18    Ht 5' 3\" (1.6 m)    Wt 88.9 kg (195 lb 14.4 oz)    SpO2 100%    Breastfeeding No    BMI 34.7 kg/m2     Head: Normocephalic, without obvious abnormality  Neck: no JVD  Lungs: clear to auscultation bilaterally  Heart: regular rate and rhythm  Abdomen: soft, non-tender.   Extremities: tr edema          Data Review    Recent Results (from the past 50 hour(s))   GLUCOSE, POC    Collection Time: 12/15/17 10:55 AM   Result Value Ref Range    Glucose (POC) 134 (H) 65 - 100 mg/dL   PTT    Collection Time: 12/15/17  1:49 PM   Result Value Ref Range    aPTT 68.6 (H) 23.2 - 35.3 SEC   GLUCOSE, POC    Collection Time: 12/15/17  5:02 PM   Result Value Ref Range    Glucose (POC) 109 (H) 65 - 100 mg/dL   PTT    Collection Time: 12/16/17 12:58 AM   Result Value Ref Range    aPTT 106.8 (HH) 23.2 - 35.3 SEC   PROTHROMBIN TIME + INR    Collection Time: 12/16/17 12:58 AM   Result Value Ref Range    Prothrombin time 22.3 (H) 11.5 - 14.5 sec    INR 2.0     GLUCOSE, POC    Collection Time: 12/16/17  1:46 AM   Result Value Ref Range    Glucose (POC) 105 (H) 65 - 100 mg/dL   GLUCOSE, POC    Collection Time: 12/16/17  6:00 AM   Result Value Ref Range    Glucose (POC) 105 (H) 65 - 100 mg/dL   PTT    Collection Time: 12/16/17  9:16 AM   Result Value Ref Range    aPTT 115.5 (HH) 23.2 - 35.3 SEC   GLUCOSE, POC    Collection Time: 12/16/17 11:30 AM   Result Value Ref Range    Glucose (POC) 158 (H) 65 - 100 mg/dL   PTT    Collection Time: 12/16/17  4:11 PM   Result Value Ref Range    aPTT 146.9 (HH) 23.2 - 35.3 SEC   GLUCOSE, POC    Collection Time: 12/16/17  7:23 PM   Result Value Ref Range    Glucose (POC) 148 (H) 65 - 100 mg/dL   PTT    Collection Time: 12/17/17 12:03 AM   Result Value Ref Range    aPTT 96.0 (HH) 23.2 - 35.3 SEC   GLUCOSE, POC    Collection Time: 12/17/17 12:04 AM   Result Value Ref Range    Glucose (POC) 127 (H) 65 - 100 mg/dL   GLUCOSE, POC    Collection Time: 12/17/17  6:33 AM   Result Value Ref Range    Glucose (POC) 106 (H) 65 - 100 mg/dL   PROTHROMBIN TIME + INR    Collection Time: 12/17/17  6:55 AM   Result Value Ref Range    Prothrombin time 22.4 (H) 11.5 - 14.5 sec    INR 2.0     PTT    Collection Time: 12/17/17  6:55 AM   Result Value Ref Range    aPTT 84.3 (H) 23.2 - 35.3 SEC       Assessment     Patient Active Problem List    Diagnosis Date Noted    Leg wound, right 12/07/2017    Acute pulmonary embolism ([facility] [address] 12/06/2017    Acute respiratory failure (HCC) 08/78/8025    Systolic CHF, chronic ([address] 10/18/2017    ESRD (end stage renal disease) on dialysis ([facility] 07/20/2017    Pulmonary edema 06/30/2017    Orthostatic hypotension     Chronic respiratory failure (HCC)     Chronic diastolic heart failure (HCC)     HTN (hypertension), ESSENTIAL     Hyperlipidemia     Coronary atherosclerosis of native coronary vessel     [facility]/09[facility]    Unable to bear weight 08/28/2014    ESRD on hemodialysis ([facility] 08/28/2014    Weakness of both lower limbs 08/28/2014    Anemia of chronic disease 08/28/2014    History of stroke 08/28/2014    CASSIE (obstructive sleep apnea) 07/16/2014    DM (diabetes mellitus) ([facility] [facility] 07/16/2014    Hypothyroidism 06/25/2014    GERD (gastroesophageal reflux disease) 06/19/2014    Lupus 06/19/2014    Morbid obesity ([facility] 06/19/2014           Problems Addressed by Nephrology     ESRD    Plan     Dialysis tomorrow. Outpatient fistulagram (already arranged). Anticipate discharge tomorrow.

## 2017-12-17 NOTE — PROGRESS NOTES
Warfarin dosing per pharmacist    Dulce Lemus is a 81 y.o. female. Height: 5' 3\" (160 cm)    Weight: 88.9 kg (195 lb 14.4 oz)    Indication:  Bilateral PE's, s/p clot burden reduction with EKOS    Goal INR:  2-3    Home dose:  New start    Risk factors or significant drug interactions:  Levothyroxine    Other anticoagulants:  Heparin drip for bridge therapy while INR subtherapeutic    Daily Monitoring  Date  INR      Warfarin dose HGB              Notes  12/11  1   5 mg  ---    12/12  1.3   5 mg  8.1  12/13  1.4   5 mg  12/14  1.6   5 mg  8.5  12/15  1.9   5 mg  ---  12/16  2   5 mg  ---  12/17  2   5 mg  ---         Pharmacy consulted to assist dosing warfarin in patient who presented with large bilateral PE's s/p EKOS on 12/8 for clot burden reduction. Continue warfarin 5 mg. INR remains therapeutic today at 2. Continuing heparin gtt for one day overlap. Pharmacy will continue to follow. Please call with any questions.     Thank you,  Irving Maria, PharmD  Clinical Pharmacist  [phone]

## 2017-12-17 NOTE — PROGRESS NOTES
Pt sitting up in chair, co nausea with no emesis, appetite poor, zofran 4mgs IVP slowly at this time

## 2017-12-18 LAB
APTT PPP: 100.4 SEC (ref 23.2–35.3)
GLUCOSE BLD STRIP.AUTO-MCNC: 101 MG/DL (ref 65–100)
GLUCOSE BLD STRIP.AUTO-MCNC: 101 MG/DL (ref 65–100)
GLUCOSE BLD STRIP.AUTO-MCNC: 140 MG/DL (ref 65–100)
INR PPP: 2.1
PROTHROMBIN TIME: 22.8 SEC (ref 11.5–14.5)

## 2017-12-18 PROCEDURE — 82962 GLUCOSE BLOOD TEST: CPT

## 2017-12-18 PROCEDURE — 74011250637 HC RX REV CODE- 250/637: Performed by: INTERNAL MEDICINE

## 2017-12-18 PROCEDURE — 36415 COLL VENOUS BLD VENIPUNCTURE: CPT | Performed by: INTERNAL MEDICINE

## 2017-12-18 PROCEDURE — 74011250636 HC RX REV CODE- 250/636: Performed by: INTERNAL MEDICINE

## 2017-12-18 PROCEDURE — 99232 SBSQ HOSP IP/OBS MODERATE 35: CPT | Performed by: INTERNAL MEDICINE

## 2017-12-18 PROCEDURE — 65270000029 HC RM PRIVATE

## 2017-12-18 PROCEDURE — 85610 PROTHROMBIN TIME: CPT | Performed by: INTERNAL MEDICINE

## 2017-12-18 PROCEDURE — 94660 CPAP INITIATION&MGMT: CPT

## 2017-12-18 PROCEDURE — 85730 THROMBOPLASTIN TIME PARTIAL: CPT | Performed by: INTERNAL MEDICINE

## 2017-12-18 RX ADMIN — PANTOPRAZOLE SODIUM 40 MG: 40 TABLET, DELAYED RELEASE ORAL at 12:51

## 2017-12-18 RX ADMIN — ERYTHROPOIETIN 10000 UNITS: 10000 INJECTION, SOLUTION INTRAVENOUS; SUBCUTANEOUS at 11:17

## 2017-12-18 RX ADMIN — FLUDROCORTISONE ACETATE 100 MCG: 0.1 TABLET ORAL at 21:13

## 2017-12-18 RX ADMIN — Medication 5 ML: at 06:00

## 2017-12-18 RX ADMIN — MIDODRINE HYDROCHLORIDE 5 MG: 5 TABLET ORAL at 17:17

## 2017-12-18 RX ADMIN — Medication 10 ML: at 12:53

## 2017-12-18 RX ADMIN — Medication 5 ML: at 21:15

## 2017-12-18 RX ADMIN — ONDANSETRON 4 MG: 2 INJECTION INTRAMUSCULAR; INTRAVENOUS at 12:52

## 2017-12-18 RX ADMIN — WARFARIN SODIUM 5 MG: 2.5 TABLET ORAL at 17:17

## 2017-12-18 RX ADMIN — MIDODRINE HYDROCHLORIDE 5 MG: 5 TABLET ORAL at 12:51

## 2017-12-18 RX ADMIN — HEPARIN SODIUM AND DEXTROSE 9 UNITS/KG/HR: 5000; 5 INJECTION INTRAVENOUS at 01:30

## 2017-12-18 RX ADMIN — Medication: at 21:15

## 2017-12-18 RX ADMIN — LEVOTHYROXINE SODIUM 175 MCG: 50 TABLET ORAL at 06:00

## 2017-12-18 NOTE — PROGRESS NOTES
Alert with unlabored respirations. Nasal cannula in place. Heparin gtt continues without difficulty. Head of bed elevated. Denies needs or pain. Shift assessment complete see flow sheets.

## 2017-12-18 NOTE — PROGRESS NOTES
Pt sitting up on side of bed. No distress noted at this time. Pt continues to complaints of nausea. Pt remains on 4  L NC At this time. Pt encouraged to call for assistance if needed call light in reach, door open will monitor.

## 2017-12-18 NOTE — PROGRESS NOTES
Pt now resting in bed with eyes closed. No s/sx of distress noted at this time. Call light in reach, door open will monitor.

## 2017-12-18 NOTE — DIALYSIS
Hemodialysis treatment initiated using Left FA Graft and 15 g needles by [facility] RN. Pt alert and VS stable. Machine settings per MD order. Will monitor during treatment.

## 2017-12-18 NOTE — PROGRESS NOTES
[address] Nephrology Progress Note    Follow-Up on: ESRD    Patient seen and examined on dialysis. Goal UF 2.5 kg. /58, HR 61. Left arm AVG Qb 400. Tolerating well. No c/o other than being \"cold\" and sleepy. ROS:  Gen - no fever, no chills, appetite unchanged  CV - no chest pain, no palpitation  Lung - shortness of breath better, no cough  Abd - no tenderness, no nausea/vomiting, no diarrhea  Ext - no edema    Exam:  Vitals:    12/18/17 0756 12/18/17 0827 12/18/17 0855 12/18/17 0925   BP: 119/60 127/57 99/55 101/58   Pulse: (!) 57 (!) 55 61 61   Resp:       Temp:       SpO2:       Weight:       Height:             Intake/Output Summary (Last 24 hours) at 12/18/17 0944  Last data filed at 12/17/17 1347   Gross per 24 hour   Intake              120 ml   Output                0 ml   Net              120 ml       Wt Readings from Last 3 Encounters:   12/18/17 83.2 kg (183 lb 6.4 oz)   09/22/17 94.8 kg (209 lb)   08/17/17 95.3 kg (210 lb)       GEN - in no distress, nasal cannula O2  CV - regular, no murmur, no rub  Lung - clear bilaterally  Abd - soft, nontender  Ext - no edema    Recent Labs      12/18/17   0615  12/17/17   0655  12/16/17   0058   INR  2.1  2.0  2.0        No results for input(s): NA, K, CL, CO2, BUN, CREA, CA, GLU, MG, PHOS in the last 72 hours. Assessment / Plan:  Principal Problem:    Acute pulmonary embolism ([address] (12/6/2017)    Active Problems:    Lupus (6/19/2014)      Morbid obesity ([address] (6/19/2014)      CASSIE (obstructive sleep apnea) (7/16/2014)      Overview: BIPAP      DM (diabetes mellitus) ([address] (7/16/2014)      ESRD on hemodialysis ([facility] (8/28/2014)      Orthostatic hypotension ()      Systolic CHF, chronic ([address] (10/18/2017)      Acute respiratory failure ([facility] (12/6/2017)      Leg wound, right (12/7/2017)      1. ESRD - HD today per routine MWF schedule  - outpatient fistulagram - already arranged  2.  Large bilateral pulmonary embolisms - s/p thrombolysis 12/8 - Heparin and warfarin  3. H/o CHF- EF 40%  4. Right leg wound  5. Anemia - multifactorial. On RODRIGO  6. Orthostatic hypotension - on florinef and midodrine.  bp stable

## 2017-12-18 NOTE — PROGRESS NOTES
PT Note:  Attempted PT, however, patient declined stating that she's too \"sick\" (nauseated). Will attempt another time/day as schedule permits.   Maylin Anderson, PT, DPT

## 2017-12-18 NOTE — PROGRESS NOTES
Pt reports \"feeling like my head is really big\" no distress noted at this time. Pt was sitting up on side of bed. Pt assisted laying back in bed. Will monitor. Pt remains on 4  L HF NC At this time.

## 2017-12-18 NOTE — PROGRESS NOTES
Winthrop Goldmann  Admission Date: 12/6/2017             Daily Progress Note: 12/18/2017    The patient's chart is reviewed and the patient is discussed with the staff. The patient's chart is reviewed and the patient is discussed with the staff. Adolfo Beard a 71yo F who was admitted on 12/6 with ESRD, CHF 40%, DM, orhtostatic hypotension who presented from rehab with right leg wound and severe hypoxia and was found to have large bilateral pulmonary emboli. EKOS performed 12/8 with possible PFO noted. TTE with only mild right to left shunt. Subjective:     Patient seen Chayito Goes in bed. On 2L O2.    Complain of nausea and lightheadedness      Current Facility-Administered Medications   Medication Dose Route Frequency    sevelamer (RENAGEL) tablet 2,400 mg  2,400 mg Oral TID WITH MEALS    epoetin arlyn (EPOGEN;PROCRIT) injection 10,000 Units  10,000 Units IntraVENous DIALYSIS MON, WED & FRI    warfarin (COUMADIN) tablet 5 mg  5 mg Oral QPM    ondansetron (ZOFRAN) injection 4 mg  4 mg IntraVENous Q6H PRN    sodium chloride (NS) flush 5-10 mL  5-10 mL IntraVENous Q8H    sodium chloride (NS) flush 5-10 mL  5-10 mL IntraVENous PRN    fludrocortisone (FLORINEF) tablet 100 mcg  100 mcg Oral QHS    levothyroxine (SYNTHROID) tablet 175 mcg  175 mcg Oral ACB    pantoprazole (PROTONIX) tablet 40 mg  40 mg Oral DAILY    insulin regular (NOVOLIN R, HUMULIN R) injection   SubCUTAneous Q6H    acetaminophen (TYLENOL) tablet 650 mg  650 mg Oral Q4H PRN    zinc oxide-cod liver oil (DESITIN) 40 % paste   Topical BID    midodrine (PROAMITINE) tablet 5 mg  5 mg Oral TID WITH MEALS       Review of Systems  Constitutional: negative for fever, chills, sweats  Cardiovascular: negative for chest pain, palpitations, syncope, edema  Gastrointestinal: negative for dysphagia, reflux, vomiting, diarrhea, abdominal pain, or melena  Neurologic: negative for focal weakness, numbness, headache    Objective: Vitals:    12/18/17 1135 12/18/17 1145 12/18/17 1247 12/18/17 1541   BP: 111/60 119/68 100/62 106/63   Pulse: 76 76 65 77   Resp:   16 18   Temp:   98.1 °F (36.7 °C) 98.5 °F (36.9 °C)   SpO2:   94% 100%   Weight:       Height:         Intake and Output:   12/16 1901 - 12/18 0700  In: 240 [P.O.:240]  Out: -   12/18 0701 - 12/18 1900  In: -   Out: 2100     Physical Exam:   Constitution:  the patient is well developed and in no acute distress  EENMT:  Sclera clear, pupils equal, oral mucosa moist  Respiratory: CTA B, no w/r/r  Cardiovascular:  RRR without M,G,R  Gastrointestinal: soft and non-tender; with positive bowel sounds. Musculoskeletal: warm without cyanosis. There is no lower leg edema. Skin:  no jaundice or rashes, no wounds   Neurologic: no gross neuro deficits     Psychiatric:  alert and oriented x ppt    CHEST XRAY:   No new imaging    LAB  No lab exists for component: [place]   Recent Labs      12/18/17   0615  12/17/17   0655  12/16/17   0058   INR  2.1  2.0  2.0     No results for input(s): NA, K, CL, CO2, GLU, BUN, CREA, MG, CA, PHOS, ALB, TBIL, GPT, SGOT, BNPP in the last 72 hours. No lab exists for component: TROIP  No results for input(s): PH, PCO2, PO2, HCO3 in the last 72 hours.       Assessment:  (Medical Decision Making)     Hospital Problems  Date Reviewed: 10/18/2017          Codes Class Noted POA    Leg wound, right ICD-10-CM: S81.801A  ICD-9-CM: 891.0  12/7/2017 Unknown        * (Principal)Acute pulmonary embolism ([facility] ICD-10-CM: I26.99  ICD-9-CM: 415.19  12/6/2017 Yes        Acute respiratory failure ([facility] ICD-10-CM: J96.00  ICD-9-CM: 518.81  12/6/2017 Yes        Systolic CHF, chronic (HCC) (Chronic) ICD-10-CM: I50.22  ICD-9-CM: 428.22, 428.0  10/18/2017 Yes        Orthostatic hypotension ICD-10-CM: I95.1  ICD-9-CM: 458.0  Unknown Yes        ESRD on hemodialysis (HCC) (Chronic) ICD-10-CM: N18.6, Z99.2  ICD-9-CM: 585.6, V45.11  8/28/2014 Yes        CASSIE (obstructive sleep apnea) (Chronic) ICD-10-CM: G47.33  ICD-9-CM: 327.23  7/16/2014 Yes    Overview Signed 7/16/2014  1:18 AM by Kimberlee Martin NP     BIPAP             DM [facility]diabetes mellitus) ([facility] (Chronic) ICD-10-CM: E11.9  ICD-9-CM: 250.00  7/16/2014 Yes        Lupus (Chronic) ICD-10-CM: L93.0  ICD-9-CM: 695.4  6/19/2014 Yes        Morbid obesity ([facility] (Chronic) ICD-10-CM: E66.01  ICD-9-CM: 278.01  6/19/2014 Yes            Patient with B PE's s/p EKOS. Has been on heparin drip. Now started on coumadin and reached therapeutic level 12/16. Nephrology managing dialysis. Plan:  (Medical Decision Making)   -HD per nephrology. outpt fistulogram next week. -weaning O2 as able.    -stop heparin  -case management to help with placement: going back to [facility] [place] when ready. -cont florinef for hypotension  -bipap at night      Mag Slaughter MD

## 2017-12-18 NOTE — DIALYSIS
HD completed without complication. 2.1 kg removed. Needles X 2 removed and pressure dressing applied. Pt alert and VS stable. Pt awaiting transport to return to room.

## 2017-12-18 NOTE — PROGRESS NOTES
Warfarin dosing per pharmacist    Dior Harman is a 81 y.o. female. Height: 5' 3\" (160 cm)    Weight: 83.2 kg (183 lb 6.4 oz)    Indication:  Bilateral PE's, s/p clot burden reduction with EKOS    Goal INR:  2-3    Home dose:  New start    Risk factors or significant drug interactions:  Levothyroxine    Other anticoagulants:  Heparin drip for bridge therapy while INR subtherapeutic    Daily Monitoring  Date  INR      Warfarin dose HGB              Notes  12/11  1   5 mg  ---    12/12  1.3   5 mg  8.1  12/13  1.4   5 mg  12/14  1.6   5 mg  8.5  12/15  1.9   5 mg  ---  12/16  2   5 mg  ---  12/17  2   5 mg  ---  12/18  2.1   5 mg  ---         Pharmacy consulted to assist dosing warfarin in patient who presented with large bilateral PE's s/p EKOS on 12/8 for clot burden reduction. INR remains therapeutic today at 2.1. Continue warfarin 5 mg qhs. Can stop heparin gtt today. Pharmacy will continue to follow. Please call with any questions.     Thank you,  Natalya Crow, PharmD  Clinical Pharmacist  [phone]

## 2017-12-18 NOTE — PROGRESS NOTES
For PTT of 100.4, no change in rate at this time. Heparin gtt continues at 9  Units/kg/hour at a rate of 16.7 ml/hour.  Will recheck PTT in the AM.

## 2017-12-18 NOTE — PROGRESS NOTES
Pt returns to room from HD. No distress noted at this time. Dressing to left arm access clean, dry, intact at this time. Pt complaints of nausea. Heparin gtt DC per order. Pt encouraged to call for assistance if needed call light in reach, door open will monitor.

## 2017-12-18 NOTE — PROGRESS NOTES
Pt resting in bed with eye closed. Pt awakens when spoken to. Pt oriented time 3 at this time. Pt denies pain or distress at this time. Pt on 4  L HF NC At this time. Pt has heparin gtt infusing. Dressing to right LE clean, dry, intact at this time. Pt encouraged to call for assistance if needed call light in reach, door open will monitor.

## 2017-12-19 ENCOUNTER — PATIENT OUTREACH (OUTPATIENT)
Dept: CASE MANAGEMENT | Age: 72
End: 2017-12-19

## 2017-12-19 VITALS
HEIGHT: 63 IN | SYSTOLIC BLOOD PRESSURE: 105 MMHG | OXYGEN SATURATION: 100 % | RESPIRATION RATE: 17 BRPM | TEMPERATURE: 97.7 F | WEIGHT: 194.1 LBS | HEART RATE: 60 BPM | BODY MASS INDEX: 34.39 KG/M2 | DIASTOLIC BLOOD PRESSURE: 65 MMHG

## 2017-12-19 PROBLEM — Z66 DNR (DO NOT RESUSCITATE): Status: ACTIVE | Noted: 2017-12-19

## 2017-12-19 PROBLEM — Z66 DNR (DO NOT RESUSCITATE): Chronic | Status: ACTIVE | Noted: 2017-12-19

## 2017-12-19 LAB
APTT PPP: 58.3 SEC (ref 23.2–35.3)
GLUCOSE BLD STRIP.AUTO-MCNC: 109 MG/DL (ref 65–100)
GLUCOSE BLD STRIP.AUTO-MCNC: 116 MG/DL (ref 65–100)
GLUCOSE BLD STRIP.AUTO-MCNC: 137 MG/DL (ref 65–100)
INR PPP: 2.5
PROTHROMBIN TIME: 26.8 SEC (ref 11.5–14.5)

## 2017-12-19 PROCEDURE — 74011250637 HC RX REV CODE- 250/637: Performed by: INTERNAL MEDICINE

## 2017-12-19 PROCEDURE — 82962 GLUCOSE BLOOD TEST: CPT

## 2017-12-19 PROCEDURE — 85730 THROMBOPLASTIN TIME PARTIAL: CPT | Performed by: INTERNAL MEDICINE

## 2017-12-19 PROCEDURE — 36415 COLL VENOUS BLD VENIPUNCTURE: CPT | Performed by: INTERNAL MEDICINE

## 2017-12-19 PROCEDURE — 85610 PROTHROMBIN TIME: CPT | Performed by: INTERNAL MEDICINE

## 2017-12-19 PROCEDURE — 74011250636 HC RX REV CODE- 250/636: Performed by: INTERNAL MEDICINE

## 2017-12-19 PROCEDURE — 99239 HOSP IP/OBS DSCHRG MGMT >30: CPT | Performed by: INTERNAL MEDICINE

## 2017-12-19 PROCEDURE — 97530 THERAPEUTIC ACTIVITIES: CPT

## 2017-12-19 RX ORDER — MIDODRINE HYDROCHLORIDE 10 MG/1
5 TABLET ORAL
Qty: 90 TAB | Refills: 1 | Status: SHIPPED
Start: 2017-12-19 | End: 2018-01-18

## 2017-12-19 RX ORDER — WARFARIN SODIUM 5 MG/1
5 TABLET ORAL EVERY EVENING
Qty: 30 TAB | Refills: 3 | Status: SHIPPED | OUTPATIENT
Start: 2017-12-19 | End: 2018-01-18

## 2017-12-19 RX ADMIN — RENAGEL 2400 MG: 400 TABLET ORAL at 08:39

## 2017-12-19 RX ADMIN — PANTOPRAZOLE SODIUM 40 MG: 40 TABLET, DELAYED RELEASE ORAL at 08:38

## 2017-12-19 RX ADMIN — Medication 5 ML: at 05:48

## 2017-12-19 RX ADMIN — ONDANSETRON 4 MG: 2 INJECTION INTRAMUSCULAR; INTRAVENOUS at 09:57

## 2017-12-19 RX ADMIN — Medication: at 08:39

## 2017-12-19 RX ADMIN — MIDODRINE HYDROCHLORIDE 5 MG: 5 TABLET ORAL at 08:39

## 2017-12-19 RX ADMIN — LEVOTHYROXINE SODIUM 175 MCG: 50 TABLET ORAL at 05:48

## 2017-12-19 NOTE — PROGRESS NOTES
Sitting on side of bed without complaints voiced. Respirations unlabored. Nasal cannula in place. Daughter at bedside.

## 2017-12-19 NOTE — PROGRESS NOTES
Warfarin dosing per pharmacist    Cheryl Devi is a 81 y.o. female. Height: 5' 3\" (160 cm)    Weight: 88 kg (194 lb 1.6 oz)    Indication:  Bilateral PE's, s/p clot burden reduction with EKOS    Goal INR:  2-3    Home dose:  New start    Risk factors or significant drug interactions:  Levothyroxine    Other anticoagulants:  Heparin drip for bridge therapy while INR subtherapeutic    Daily Monitoring  Date  INR      Warfarin dose HGB              Notes  12/11  1   5 mg  ---    12/12  1.3   5 mg  8.1  12/13  1.4   5 mg  12/14  1.6   5 mg  8.5  12/15  1.9   5 mg  ---  12/16  2   5 mg  ---  12/17  2   5 mg  ---  12/18  2.1   5 mg  ---   12/19  2.5   5 mg       Pharmacy consulted to assist dosing warfarin in patient who presented with large bilateral PE's s/p EKOS on 12/8 for clot burden reduction. INR remains therapeutic today and trending up slightly. Will continue daily INR for now. If stable tomorrow, and no new drug-drug interactions noted, can consider [id number] monitoring. Continue warfarin 5 mg qhs. Pharmacy will continue to follow. Please call with any questions. Thank you,  Kyler Toney.  Juan Chandler, PharmD  Clinical Pharmacist  [phone]

## 2017-12-19 NOTE — DISCHARGE SUMMARY
[facility]: Discharge Summary    Saima Lopez    Admission date:12/6/2017    Discharge date:12/19/2017    Admitting Diagnosis:Acute pulmonary embolism [facility])    Discharge Diagnoses:    Hospital Problems  Date Reviewed: 10/18/2017          Codes Class Noted POA    Leg wound, right ICD-10-CM: S81.801A  ICD-9-CM: 891.0  12/7/2017 Unknown        * (Principal)Acute pulmonary embolism ([facility] ICD-10-CM: I26.99  ICD-9-CM: 415.19  12/6/2017 Yes        Acute respiratory failure ([facility] ICD-10-CM: J96.00  ICD-9-CM: 518.81  12/6/2017 Yes        Systolic CHF, chronic (HCC) (Chronic) ICD-10-CM: I50.22  ICD-9-CM: 428.22, 428.0  10/18/2017 Yes        Orthostatic hypotension ICD-10-CM: I95.1  ICD-9-CM: 458.0  Unknown Yes        ESRD on hemodialysis (HCC) (Chronic) ICD-10-CM: N18.6, Z99.2  ICD-9-CM: 585.6, V45.11  8/28/2014 Yes        CASSIE (obstructive sleep apnea) (Chronic) ICD-10-CM: G47.33  ICD-9-CM: 327.23  7/16/2014 Yes    Overview Signed 7/16/2014  1:18 AM by Santhosh Hargrove NP     BIPAP             DM (diabetes mellitus) ([facility] (Chronic) ICD-10-CM: E11.9  ICD-9-CM: 250.00  7/16/2014 Yes        Lupus (Chronic) ICD-10-CM: L93.0  ICD-9-CM: 695.4  6/19/2014 Yes        Morbid obesity ([facility] (Chronic) ICD-10-CM: E66.01  ICD-9-CM: 278.01  6/19/2014 Yes              Consultants:    Studies/Procedures:  * No surgery found *  Cardiology and IR Orders (Through next 24h)    Start     Ordered    12/08/17 0400  IR REMOVE THER TXCATH INF THROMB CESSATION  [[id number]]  TOMORROW AM      12/07/17 1652    12/07/17 1445  IR THROMBOLYSIS TRANSCATH NON CORONARY OR INTRACRANIAL  [[id number]]  ONE TIME      12/07/17 1433      12/10:             Echo:     INDICATIONS: Rule out Shunt, with Bubble    HISTORY: PRIOR HISTORY: 2-D Complete Echo 12-6-2017    PROCEDURE: This was a routine study. A transthoracic echocardiogram was  performed. The study included limited 2D imaging and limited spectral   Doppler.   Intravenous contrast (agitated saline) was administered in the right arm. Image  quality was adequate. ATRIAL SEPTUM: There was a mild right-to-left shunt, induced by the Valsalva  maneuver. SUMMARY:    -  Atrial septum: There was a mild right-to-left shunt, induced by the   Valsalva maneuver. Recent Results (from the past 24 hour(s))   GLUCOSE, POC    Collection Time: 12/18/17  4:51 PM   Result Value Ref Range    Glucose (POC) 140 (H) 65 - 100 mg/dL   GLUCOSE, POC    Collection Time: 12/19/17 12:28 AM   Result Value Ref Range    Glucose (POC) 116 (H) 65 - 100 mg/dL   PTT    Collection Time: 12/19/17  5:08 AM   Result Value Ref Range    aPTT 58.3 (H) 23.2 - 35.3 SEC   PROTHROMBIN TIME + INR    Collection Time: 12/19/17  5:08 AM   Result Value Ref Range    Prothrombin time 26.8 (H) 11.5 - 14.5 sec    INR 2.5     GLUCOSE, POC    Collection Time: 12/19/17  5:42 AM   Result Value Ref Range    Glucose (POC) 109 (H) 65 - 100 mg/dL         Hospital course:  Keith johnson 24IL F who was admitted on 12/6 with ESRD, CHF 40%, DM, orhtostatic hypotension who presented from rehab with right leg wound and severe hypoxia and was found to have large bilateral pulmonary emboli. EKOS performed 12/8 with possible PFO noted. TTE with only mild right to left shunt. Seen by neprhology and HD continued here. Given heparin drip and given Warfarin. Increased activity and ordered PT eval. She continued BIPAP with sleep and weaned oxygen. She did indicate that she is a DNR. She will follow up with outpt fistulogram next week per nephrology. She is on florinef and midodrine chronically and will continue after discharge for chronic hypotension.        Discharge Exam:  Visit Vitals    /71 (BP 1 Location: Right arm, BP Patient Position: At rest)    Pulse (!) 56    Temp 97.6 °F (36.4 °C)    Resp 17    Ht 5' 3\" (1.6 m)    Wt 194 lb 1.6 oz (88 kg)    SpO2 100%    Breastfeeding No    BMI 34.38 kg/m2       General appearance: alert, cooperative, no distress   Respiratory: clear to auscultation bilaterally   Cardiovascular: S1, S2, no murmur, click, rub or gallop   GI: soft, non-tender. + Bowel sounds X 4 Q. Musculoskeletal: no cyanosis or edema   Skin: Skin color, texture, turgor appropriate. No lesions noted. Neurologic: Alert and oriented X 3- appropriate. Discharge Medications:   Current Discharge Medication List      START taking these medications    Details   warfarin (COUMADIN) 5 mg tablet Take 1 Tab by mouth every evening for 30 days. Qty: 30 Tab, Refills: 3         CONTINUE these medications which have CHANGED    Details   midodrine (PROAMITINE) 10 mg tablet Take 1 Tab by mouth three (3) times daily (with meals) for 30 days. Qty: 90 Tab, Refills: 1         CONTINUE these medications which have NOT CHANGED    Details   insulin glargine (LANTUS SOLOSTAR) 100 unit/mL (3 mL) inpn 5 Units by SubCUTAneous route nightly. levothyroxine (SYNTHROID) 200 mcg tablet Take 200 mcg by mouth Daily (before breakfast). lidocaine-prilocain-0.9 % NaCl 2.5 % -2.5 % kit by Does Not Apply route. Apply to left inner forearm topically one time a day every MWF for numbing dialysis access. polyethylene glycol (MIRALAX) 17 gram packet Take 17 g by mouth daily as needed. FOLIC ACID/VIT B COMPLEX AND C (LINDA-WAQAR PO) Take 1 Tab by mouth daily. guaiFENesin ER (MUCINEX) 600 mg ER tablet Take 1,200 mg by mouth two (2) times a day. OTHER by Nasal route. Calcitonin [place]   200 ACT spray once daily in nose       ergocalciferol (VITAMIN D2) 50,000 unit capsule Take 50,000 Units by mouth. Twice per week      pantoprazole (PROTONIX) 40 mg tablet Take 40 mg by mouth daily. clopidogrel (PLAVIX) 75 mg tablet Take  by mouth daily. Omega-3 Fatty Acids 300 mg cap Take 1,200 mg by mouth nightly.  Last dose 6/20/16      allopurinol (ZYLOPRIM) 100 mg tablet Take  by mouth every morning.      gabapentin (NEURONTIN) 100 mg capsule Take 100 mg by mouth daily. Indications: NEUROPATHIC PAIN      fludrocortisone (FLORINEF) 0.1 mg tablet Take  by mouth nightly. Indications: Symptomatic Orthostatic Hypotension      acetaminophen (TYLENOL) 325 mg tablet Take 2 Tabs by mouth every four (4) hours as needed for Fever (for fever greater than 100.4 F). Qty: 30 Tab, Refills: 0      sevelamer carbonate (RENVELA) 800 mg tab tab Take 2,400 mg by mouth three (3) times daily (with meals). cyanocobalamin (VITAMIN B-12) 1,000 mcg tablet Take 1,000 mcg by mouth every morning. Activity: needs assistance    Diet Restrictions: cardiac    Disposition: stable    Followup/Outpt Studies        Plans to discharge to [facility] [place] [address] coumadin with goal 2-3. INR 2.5 today. Repeat INR on Thursday. BiPAP Q HS, 2 lpm O2 during the day  She will plan to see us back in 3-4 weeks    Maria G Salmeron NP                    Lungs:  CTA B, no w/r/r  Heart:  RRR with no Murmur/Rubs/Gallops    Additional Comments:    Patient admitted with B PE's. S/p EKOS. Now therapeutic on coumadin. Continue with outpatient follow up. Back to [facility] today. I have spoken with and examined the patient. I agree with the above assessment and plan as documented.     Zeinab Harkins MD

## 2017-12-19 NOTE — DISCHARGE INSTRUCTIONS
DISCHARGE SUMMARY from Nurse    PATIENT INSTRUCTIONS:    After general anesthesia or intravenous sedation, for 24 hours or while taking prescription Narcotics:  · Limit your activities  · Do not drive and operate hazardous machinery  · Do not make important personal or business decisions  · Do  not drink alcoholic beverages  · If you have not urinated within 8 hours after discharge, please contact your surgeon on call. Report the following to your surgeon:  · Excessive pain, swelling, redness or odor of or around the surgical area  · Temperature over 100.5  · Nausea and vomiting lasting longer than 4 hours or if unable to take medications  · Any signs of decreased circulation or nerve impairment to extremity: change in color, persistent  numbness, tingling, coldness or increase pain  · Any questions    What to do at Home:  Recommended activity: Activity as tolerated    If you experience any of the following symptoms shortness of breath not relieved by rest, pain not relieved by medications, nausea. Vomiting, diarrhea,, temperature greater than 101, chest pain or pressure or increase in weakness fatigue or swelling please follow up with MD.    *  Please give a list of your current medications to your Primary Care Provider. *  Please update this list whenever your medications are discontinued, doses are      changed, or new medications (including over-the-counter products) are added. *  Please carry medication information at all times in case of emergency situations. These are general instructions for a healthy lifestyle:    No smoking/ No tobacco products/ Avoid exposure to second hand smoke  Surgeon General's Warning:  Quitting smoking now greatly reduces serious risk to your health.     Obesity, smoking, and sedentary lifestyle greatly increases your risk for illness    A healthy diet, regular physical exercise & weight monitoring are important for maintaining a healthy lifestyle    You may be retaining fluid if you have a history of heart failure or if you experience any of the following symptoms:  Weight gain of 3 pounds or more overnight or 5 pounds in a week, increased swelling in our hands or feet or shortness of breath while lying flat in bed. Please call your doctor as soon as you notice any of these symptoms; do not wait until your next office visit. Recognize signs and symptoms of STROKE:    F-face looks uneven    A-arms unable to move or move unevenly    S-speech slurred or non-existent    T-time-call 911 as soon as signs and symptoms begin-DO NOT go       Back to bed or wait to see if you get better-TIME IS BRAIN. Warning Signs of HEART ATTACK     Call 911 if you have these symptoms:   Chest discomfort. Most heart attacks involve discomfort in the center of the chest that lasts more than a few minutes, or that goes away and comes back. It can feel like uncomfortable pressure, squeezing, fullness, or pain.  Discomfort in other areas of the upper body. Symptoms can include pain or discomfort in one or both arms, the back, neck, jaw, or stomach.  Shortness of breath with or without chest discomfort.  Other signs may include breaking out in a cold sweat, nausea, or lightheadedness. Don't wait more than five minutes to call 911 - MINUTES MATTER! Fast action can save your life. Calling 911 is almost always the fastest way to get lifesaving treatment. Emergency Medical Services staff can begin treatment when they arrive -- up to an hour sooner than if someone gets to the hospital by car. The discharge information has been reviewed with the patient. The patient verbalized understanding. Discharge medications reviewed with the patient and appropriate educational materials and side effects teaching were provided.   ___________________________________________________________________________________________________________________________________

## 2017-12-19 NOTE — PROGRESS NOTES
Pt resting in bed. Pt awakens when spoken to. Pt denies pain or distress at this. Pt on 3  L HF NC At this time. Pt encouraged to call for assistance if needed call light in reach, door open will monitor.

## 2017-12-19 NOTE — PROGRESS NOTES
Report given to [facility] at [facility] and rehab.  Pt being transported via [address] [place] [address]

## 2017-12-19 NOTE — PROGRESS NOTES
Patient will DC to [facility] today  Alerted SNF Coordinator - Mabel ROBLESN, RN    Plan - set reminder to check in with Ru Alford in 2 weeks.

## 2017-12-19 NOTE — PROGRESS NOTES
Pt is discharging to [facility] via [facility]. Pt's spouse at bedside. Report line and room number given to [facility] made aware of pt discharging today. SW remains available.

## 2017-12-19 NOTE — PROGRESS NOTES
Problem: Mobility Impaired (Adult and Pediatric)  Goal: *Acute Goals and Plan of Care (Insert Text)  STG:  (1.)Ms. Victoria Urrutia will move from supine to sit and sit to supine , scoot up and down and roll side to side with MINIMAL ASSIST within 3 day(s). (2.)Ms. Victoria Urrutia will transfer from bed to chair and chair to bed with MINIMAL ASSIST using the least restrictive device within 3 day(s). GOAL MET 12/19/2017  (3.)Ms. Victoria Urrutia will ambulate with MINIMAL ASSIST for 15 feet with the least restrictive device within 2 day(s). GOAL MET 12/19/2017  (4.)Ms. Victoria Urrutia will perform LE exercises with 1 to 2 cues for form within 3 days to improve strength for functional transfers and ambulation. (5.)Ms. Victoria Urrutia will perform standing static and dynamic balance activities x 15+ minutes with MINIMAL ASSIST to improve safety within 3 day(s). LTG:  (1.)Ms. Victoria Urrutia will move from supine to sit and sit to supine , scoot up and down and roll side to side in bed with CONTACT GUARD ASSIST within 7 day(s). (2.)Ms. Victoria Urrutia will transfer from bed to chair and chair to bed with CONTACT GUARD ASSIST using the least restrictive device within 7 day(s). (3.)Ms. Victoria Urrutia will ambulate with CONTACT GUARD ASSIST for 30 feet with the least restrictive device within 7 day(s). (4.)Ms. Victoria Urrutia will perform standing static and dynamic balance activities x 25 minutes with CONTACT GUARD ASSIST to improve safety within 7 day(s).   ________________________________________________________________________________________________      PHYSICAL THERAPY: Daily Note, Treatment Day: 1st, AM 12/19/2017  INPATIENT: Hospital Day: 14  [place] / [place] / Product Type: Medicare /      NAME/AGE/GENDER: Rose Marie Farrell is a 81 y.o. female   PRIMARY DIAGNOSIS: Acute pulmonary embolism ([facility] Acute pulmonary embolism ([facility] [address] Acute pulmonary embolism (HCC)        ICD-10: Treatment Diagnosis:   · Difficulty in walking, Not elsewhere classified (R26.2)   Precaution/Allergies:  Codeine      ASSESSMENT:     Ms. Rebeca Martinez is sitting EOB on arrival, on 3 L/min O2 via n.c, spouse present. Pt minimally agreeable to PT treatment. Pt states nauseous, RN given medication. Pt encouraged for OOB activity. Pt has refused multiple therapy attempts due to nausea and has not been seen since 12/11/17. Pt has to be encouraged and somewhat prodded to perform activity. Pt demo improved mobility from initial evaluation. Pt required less assist this date, min A for transfers and ambulation around bed to chair in room. Pt with very slow shuffled gait pattern but stable. Pt refused further activity once positioned in chair. Pt has met 2 STGs set on initial evaluation. Pt making very slow progress toward goals and overall mobility. PT to cont to follow for acute care needs. This section established at most recent assessment   PROBLEM LIST (Impairments causing functional limitations):  1. Decreased Strength  2. Decreased ADL/Functional Activities  3. Decreased Transfer Abilities  4. Decreased Ambulation Ability/Technique  5. Decreased Balance  6. Decreased Activity Tolerance  7. Decreased Knowledge of Precautions  8. Decreased [place] with Home Exercise Program   INTERVENTIONS PLANNED: (Benefits and precautions of physical therapy have been discussed with the patient.)  1. Balance Exercise  2. Bed Mobility  3. Family Education  4. Gait Training  5. Home Exercise Program (HEP)  6. Therapeutic Activites  7. Therapeutic Exercise/Strengthening  8. Transfer Training  9. Group Therapy     TREATMENT PLAN: Frequency/Duration: 3 times a week for duration of hospital stay  Rehabilitation Potential For Stated Goals: [facility] REHABILITATION/EQUIPMENT: (at time of discharge pending progress): Due to the probability of continued deficits (see above) this patient will likely need continued skilled physical therapy after discharge.   Equipment:    None at this time HISTORY:   History of Present Injury/Illness (Reason for Referral):  Per MD Note: \"Patient is a 81 y.o.  female presents with shortness of breath.     Ms Maria G Moctezuma is a 81 yr old lady with ESRD, CHF EF40%, DM, orthostatic hypotension coming in from rehab with shortness of breath according to her daughter and being found hypotensive. Patient is not able to give much history but denies any chest pain, fever, chills or rigors. She does have a wound on her right leg that she thinks getting worse. Patient was found to be severely hypoxic in the ED requiring BIPAP 100% with POx 90%. I was called by the ED for admission but I asked for a CTA to rule out PE which came positive for bilateral PEs and I asked to start the heparin drip and went to assess her immediately. \"  Past Medical History/Comorbidities:   Ms. Mraia G Moctezuma  has a past medical history of Anemia of chronic disease; Arthritis; Chronic diastolic heart failure ([address] Chronic kidney disease; Chronic pain; Chronic respiratory failure ([address] Coagulation disorder ([address] Congestive heart failure, unspecified; Coronary atherosclerosis of native coronary vessel; CVA (cerebral vascular accident) ([address] (6/25/2014); Diabetes ([address] (1980's); Diabetic neuropathy ([address] DM (diabetes mellitus) ([address] (7/16/2014); End stage renal disease ([address] ESRD on hemodialysis ([address] (4/2014); Fibromyalgia; Gout; Heart failure ([address] Hemodialysis access site with mature fistula ([address] (8/27/14); Hemodialysis access, AV graft ([address] (9/9/2014); Hepatomegaly; History of stroke (8/28/2014); Hypercholesterolemia; Hypothyroidism (approx 2000); Lupus; Morbid obesity ([address] (6/21/16); NSVT (nonsustained ventricular tachycardia) ([address] (6/19/2014); Orthostatic hypotension; CASSIE (obstructive sleep apnea) (6/21/16); Poor historian (6/21/16); Sepsis ([facility] (6/19/2014); Stroke [facility]); Unable to bear weight (8/28/2014); Unspecified sleep apnea (11/13);  Vertigo as late effect of stroke (6/21/16); and Weakness of both lower limbs (8/28/2014). She also has no past medical history of Coagulation defects or Nausea & vomiting. Ms. Sumit Ortiz  has a past surgical history that includes lap cholecystectomy (2009); hysterectomy (1975); vascular access (2013 and 4); vascular access (8/27/14); vascular access; vascular surgery procedure unlist (8-27-14); vascular surgery procedure unlist; gi; gi; and colonoscopy (N/A, 6/23/2016). Social History/Living Environment:   Home Environment: Private residence  # Steps to Enter: 1  One/Two Story Residence: One story  Living Alone: No  Support Systems: Spouse/Significant Other/Partner  Patient Expects to be Discharged to[de-identified] Rehabilitation facility  Current DME Used/Available at Home: theron Henderson  Prior Level of Function/Work/Activity:  Patient ambulated with rollator walker prior to admission. Number of Personal Factors/Comorbidities that affect the Plan of Care: 3+: HIGH COMPLEXITY   EXAMINATION:   Most Recent Physical Functioning:   Gross Assessment:                  Posture:  Posture Assessment:  Forward head, Rounded shoulders  Balance:  Sitting: Intact  Sitting - Static: Good (unsupported)  Sitting - Dynamic: Good (unsupported) (to fair)  Standing: Impaired  Standing - Static: Fair  Standing - Dynamic : Fair Bed Mobility:  Supine to Sit:  (sitting EOB on arrival)  Sit to Supine:  (left up in chair post session)  Wheelchair Mobility:     Transfers:  Sit to Stand: Minimum assistance  Stand to Sit: Minimum assistance  Bed to Chair: Minimum assistance  Gait:     Base of Support: Widened  Speed/Wendie: Slow  Step Length: Left shortened;Right shortened  Swing Pattern: Left symmetrical;Right symmetrical  Gait Abnormalities: Decreased step clearance;Shuffling gait  Distance (ft): 10 Feet (ft) (around bed to chair)  Assistive Device: Walker, rolling  Ambulation - Level of Assistance: Contact guard assistance  Interventions: Safety awareness training;Verbal cues      Body Structures Involved:  1. Nerves  2. Lungs  3. Muscles Body Functions Affected:  1. Sensory/Pain  2. Respiratory  3. Neuromusculoskeletal  4. Movement Related Activities and Participation Affected:  1. Mobility  2. Self Care  3. Domestic Life  4. Interpersonal Interactions and Relationships  5. Community, Social and [place]   Number of elements that affect the Plan of Care: 4+: HIGH COMPLEXITY   CLINICAL PRESENTATION:   Presentation: Evolving clinical presentation with changing clinical characteristics: MODERATE COMPLEXITY   CLINICAL DECISION MAKIN [facility] Inpatient Short Form  How much difficulty does the patient currently have. .. Unable A Lot A Little None   1. Turning over in bed (including adjusting bedclothes, sheets and blankets)? [] 1   [x] 2   [] 3   [] 4   2. Sitting down on and standing up from a chair with arms ( e.g., wheelchair, bedside commode, etc.)   [] 1   [x] 2   [] 3   [] 4   3. Moving from lying on back to sitting on the side of the bed? [] 1   [x] 2   [] 3   [] 4   How much help from another person does the patient currently need. .. Total A Lot A Little None   4. Moving to and from a bed to a chair (including a wheelchair)? [] 1   [x] 2   [] 3   [] 4   5. Need to walk in hospital room? [x] 1   [] 2   [] 3   [] 4   6. Climbing 3-5 steps with a railing? [x] 1   [] 2   [] 3   [] 4   © , Trustees of [address] [place] [address], under license to [employer]. All rights reserved      Score:  Initial: 10 Most Recent: X (Date: -- )    Interpretation of Tool:  Represents activities that are increasingly more difficult (i.e. Bed mobility, Transfers, Gait). Score 24 23 22-20 19-15 14-10 9-7 6     Modifier CH CI CJ CK CL CM CN      ?  Mobility - Walking and Moving Around:     - CURRENT STATUS: CL - 60%-79% impaired, limited or restricted    - GOAL STATUS: CK - 40%-59% impaired, limited or restricted    - D/C STATUS:  ---------------To be determined---------------  [place] / [place] / Product Type: Medicare /      Medical Necessity:     · Patient demonstrates good rehab potential due to higher previous functional level. Reason for Services/Other Comments:  · Patient continues to require modification of therapeutic interventions to increase complexity of exercises. Use of outcome tool(s) and clinical judgement create a POC that gives a: Questionable prediction of patient's progress: MODERATE COMPLEXITY            TREATMENT:   (In addition to Assessment/Re-Assessment sessions the following treatments were rendered)   Pre-treatment Symptoms/Complaints:  \"I feel nauseous\"  Pain: Initial:   Pain Intensity 1: 0  Post Session:  0/10     Therapeutic Activity: (    15 min): Therapeutic activities including Chair transfers, Ambulation on level ground and sit to stand transfers, weight shifting to improve mobility, strength, balance and coordination. Required minimal Safety awareness training;Verbal cues to promote static and dynamic balance in standing and promote coordination of bilateral, lower extremity(s). Braces/Orthotics/Lines/Etc:   · O2 Device: Nasal cannula  Treatment/Session Assessment:    · Response to Treatment:  Fatigued and nauseous. Requires encouragment  · Interdisciplinary Collaboration:   o Physical Therapist  o Registered Nurse  · After treatment position/precautions:   o Up in chair  o Bed/Chair-wheels locked  o Bed in low position  o Call light within reach  o RN notified  o Family at bedside   · Compliance with Program/Exercises: Will assess as treatment progresses. · Recommendations/Intent for next treatment session: \"Next visit will focus on advancements to more challenging activities and reduction in assistance provided\".   Total Treatment Duration:PT Patient Time In/Time Out  Time In: 3411  Time Out: [address], PT

## 2017-12-28 PROBLEM — E11.21 TYPE 2 DIABETES MELLITUS WITH NEPHROPATHY (HCC): Status: ACTIVE | Noted: 2017-12-28

## 2018-01-05 ENCOUNTER — PATIENT OUTREACH (OUTPATIENT)
Dept: CASE MANAGEMENT | Age: 73
End: 2018-01-05

## 2018-01-05 NOTE — PROGRESS NOTES
Patient continues in rehab at [facility]  They report that patient is on dialysis 3x per week, CPAP therapy, assist x2 with ADLs  Patient is scheduled for AV Fistula on 1/10/2018    Plan -   Follow up with SNF Coordinator in 2 weeks

## 2018-01-05 NOTE — Clinical Note
If patient discharges to home from [facility]'s transition this patient to CM associated with her PCP practice

## 2018-01-09 ENCOUNTER — ANESTHESIA EVENT (OUTPATIENT)
Dept: SURGERY | Age: 73
End: 2018-01-09
Payer: MEDICARE

## 2018-01-09 RX ORDER — HYDROMORPHONE HYDROCHLORIDE 2 MG/ML
0.5 INJECTION, SOLUTION INTRAMUSCULAR; INTRAVENOUS; SUBCUTANEOUS
Status: CANCELLED | OUTPATIENT
Start: 2018-01-09

## 2018-01-09 RX ORDER — SODIUM CHLORIDE, SODIUM LACTATE, POTASSIUM CHLORIDE, CALCIUM CHLORIDE 600; 310; 30; 20 MG/100ML; MG/100ML; MG/100ML; MG/100ML
75 INJECTION, SOLUTION INTRAVENOUS CONTINUOUS
Status: CANCELLED | OUTPATIENT
Start: 2018-01-09

## 2018-01-09 NOTE — PERIOP NOTES
Kings Holbrook from Dr. Josafat Mccarty office called me back. The office nurse spoke with Dr. Princess Melo in the OR. She communicated that Dr. Princess Melo said pt did not need to be off of coumadin for tomorrow's procedure. Called Seda in pre assessment & informed her of this.

## 2018-01-09 NOTE — PERIOP NOTES
[address] with a nurse, Kofi Sifuentes. She said there has not been much communication regarding tomorrow's procedure, especially regarding medications. No meds have been held. Last coumadin yesterday (dose 4 mg daily) and plavix today. Pt had dialysis yesterday & today. Transport arranged for arrival to pre op at [facility]. Transferred  Kofi Sifuentes to speak to our pre assessment nurses. Also called Dr. Stefano Mc office & spoke with Dale Lynn. She will have NP Sharyle Reeds call pre op in a few minutes.

## 2018-01-10 ENCOUNTER — ANESTHESIA (OUTPATIENT)
Dept: SURGERY | Age: 73
End: 2018-01-10
Payer: MEDICARE

## 2018-01-10 ENCOUNTER — APPOINTMENT (OUTPATIENT)
Dept: INTERVENTIONAL RADIOLOGY/VASCULAR | Age: 73
End: 2018-01-10
Attending: SURGERY
Payer: MEDICARE

## 2018-01-10 ENCOUNTER — HOSPITAL ENCOUNTER (OUTPATIENT)
Age: 73
Setting detail: OUTPATIENT SURGERY
Discharge: HOME | End: 2018-01-10
Attending: SURGERY | Admitting: SURGERY
Payer: MEDICARE

## 2018-01-10 VITALS
SYSTOLIC BLOOD PRESSURE: 128 MMHG | RESPIRATION RATE: 16 BRPM | TEMPERATURE: 97.9 F | HEIGHT: 63 IN | WEIGHT: 197.97 LBS | DIASTOLIC BLOOD PRESSURE: 58 MMHG | BODY MASS INDEX: 35.08 KG/M2 | OXYGEN SATURATION: 97 % | HEART RATE: 66 BPM

## 2018-01-10 LAB
GLUCOSE BLD STRIP.AUTO-MCNC: 106 MG/DL (ref 65–100)
INR BLD: 1.2 (ref 0.9–1.2)
POTASSIUM BLD-SCNC: 3.6 MMOL/L (ref 3.5–5.1)
PT BLD: 14.7 SECS (ref 9.6–11.6)

## 2018-01-10 PROCEDURE — 82962 GLUCOSE BLOOD TEST: CPT

## 2018-01-10 PROCEDURE — 84132 ASSAY OF SERUM POTASSIUM: CPT

## 2018-01-10 PROCEDURE — 76010000149 HC OR TIME 1 TO 1.5 HR: Performed by: SURGERY

## 2018-01-10 PROCEDURE — 74011250636 HC RX REV CODE- 250/636

## 2018-01-10 PROCEDURE — 74011250637 HC RX REV CODE- 250/637: Performed by: ANESTHESIOLOGY

## 2018-01-10 PROCEDURE — 74011000250 HC RX REV CODE- 250: Performed by: SURGERY

## 2018-01-10 PROCEDURE — 76060000034 HC ANESTHESIA 1.5 TO 2 HR: Performed by: SURGERY

## 2018-01-10 PROCEDURE — 74011250636 HC RX REV CODE- 250/636: Performed by: SURGERY

## 2018-01-10 PROCEDURE — 76210000016 HC OR PH I REC 1 TO 1.5 HR: Performed by: SURGERY

## 2018-01-10 PROCEDURE — 85610 PROTHROMBIN TIME: CPT

## 2018-01-10 PROCEDURE — 75710 ARTERY X-RAYS ARM/LEG: CPT

## 2018-01-10 PROCEDURE — 76210000026 HC REC RM PH II 1 TO 1.5 HR: Performed by: SURGERY

## 2018-01-10 PROCEDURE — 77030020782 HC GWN BAIR PAWS FLX 3M -B: Performed by: ANESTHESIOLOGY

## 2018-01-10 PROCEDURE — 74011636320 HC RX REV CODE- 636/320: Performed by: SURGERY

## 2018-01-10 PROCEDURE — C1894 INTRO/SHEATH, NON-LASER: HCPCS | Performed by: SURGERY

## 2018-01-10 RX ORDER — HEPARIN SODIUM 200 [USP'U]/100ML
INJECTION, SOLUTION INTRAVENOUS
Status: DISCONTINUED | OUTPATIENT
Start: 2018-01-10 | End: 2018-01-10 | Stop reason: HOSPADM

## 2018-01-10 RX ORDER — LIDOCAINE HYDROCHLORIDE 20 MG/ML
INJECTION, SOLUTION EPIDURAL; INFILTRATION; INTRACAUDAL; PERINEURAL AS NEEDED
Status: DISCONTINUED | OUTPATIENT
Start: 2018-01-10 | End: 2018-01-10 | Stop reason: HOSPADM

## 2018-01-10 RX ORDER — FENTANYL CITRATE 50 UG/ML
100 INJECTION, SOLUTION INTRAMUSCULAR; INTRAVENOUS ONCE
Status: DISCONTINUED | OUTPATIENT
Start: 2018-01-10 | End: 2018-01-10 | Stop reason: HOSPADM

## 2018-01-10 RX ORDER — SODIUM CHLORIDE 9 MG/ML
INJECTION, SOLUTION INTRAVENOUS
Status: DISCONTINUED | OUTPATIENT
Start: 2018-01-10 | End: 2018-01-10 | Stop reason: HOSPADM

## 2018-01-10 RX ORDER — SODIUM CHLORIDE, SODIUM LACTATE, POTASSIUM CHLORIDE, CALCIUM CHLORIDE 600; 310; 30; 20 MG/100ML; MG/100ML; MG/100ML; MG/100ML
75 INJECTION, SOLUTION INTRAVENOUS CONTINUOUS
Status: DISCONTINUED | OUTPATIENT
Start: 2018-01-10 | End: 2018-01-10 | Stop reason: HOSPADM

## 2018-01-10 RX ORDER — PROPOFOL 10 MG/ML
INJECTION, EMULSION INTRAVENOUS
Status: DISCONTINUED | OUTPATIENT
Start: 2018-01-10 | End: 2018-01-10 | Stop reason: HOSPADM

## 2018-01-10 RX ORDER — MIDAZOLAM HYDROCHLORIDE 1 MG/ML
2 INJECTION, SOLUTION INTRAMUSCULAR; INTRAVENOUS ONCE
Status: DISCONTINUED | OUTPATIENT
Start: 2018-01-10 | End: 2018-01-10 | Stop reason: HOSPADM

## 2018-01-10 RX ORDER — FAMOTIDINE 20 MG/1
20 TABLET, FILM COATED ORAL ONCE
Status: COMPLETED | OUTPATIENT
Start: 2018-01-10 | End: 2018-01-10

## 2018-01-10 RX ORDER — PROPOFOL 10 MG/ML
INJECTION, EMULSION INTRAVENOUS AS NEEDED
Status: DISCONTINUED | OUTPATIENT
Start: 2018-01-10 | End: 2018-01-10 | Stop reason: HOSPADM

## 2018-01-10 RX ORDER — CEFAZOLIN SODIUM/WATER 2 G/20 ML
2 SYRINGE (ML) INTRAVENOUS
Status: COMPLETED | OUTPATIENT
Start: 2018-01-10 | End: 2018-01-10

## 2018-01-10 RX ORDER — LIDOCAINE HYDROCHLORIDE 10 MG/ML
0.1 INJECTION INFILTRATION; PERINEURAL AS NEEDED
Status: DISCONTINUED | OUTPATIENT
Start: 2018-01-10 | End: 2018-01-10 | Stop reason: HOSPADM

## 2018-01-10 RX ADMIN — Medication 2 G: at 13:18

## 2018-01-10 RX ADMIN — FAMOTIDINE 20 MG: 20 TABLET, FILM COATED ORAL at 10:34

## 2018-01-10 RX ADMIN — SODIUM CHLORIDE: 9 INJECTION, SOLUTION INTRAVENOUS at 12:59

## 2018-01-10 RX ADMIN — PROPOFOL 70 MG: 10 INJECTION, EMULSION INTRAVENOUS at 13:04

## 2018-01-10 RX ADMIN — PROPOFOL 200 MCG/KG/MIN: 10 INJECTION, EMULSION INTRAVENOUS at 13:04

## 2018-01-10 NOTE — DISCHARGE INSTRUCTIONS
Dr Shelbie Luna [phone]  F/U appt 1/30/2018 at 1:30    ACTIVITY  · As tolerated and as directed by your doctor. · Bathe or shower as directed by your doctor. DIET  · Clear liquids until no nausea or vomiting; then light diet for the first day. · Advance to regular diet on second day, unless your doctor orders otherwise. · If nausea and vomiting continues, call your doctor. PAIN  · Take pain medication as directed by your doctor. · Call your doctor if pain is NOT relieved by medication. · DO NOT take aspirin of blood thinners unless directed by your doctor. DRESSING CARE       CALL YOUR DOCTOR IF   · Excessive bleeding that does not stop after holding pressure over the area  · Temperature of 101 degrees F or above  · Excessive redness, swelling or bruising, and/ or green or yellow, smelly discharge from incision    AFTER ANESTHESIA   · For the first 24 hours: DO NOT Drive, Drink alcoholic beverages, or Make important decisions. · Be aware of dizziness following anesthesia and while taking pain medication. APPOINTMENT DATE/ TIME    YOUR DOCTOR'S PHONE NUMBER       DISCHARGE SUMMARY from Nurse    PATIENT INSTRUCTIONS:    After general anesthesia or intravenous sedation, for 24 hours or while taking prescription Narcotics:  · Limit your activities  · Do not drive and operate hazardous machinery  · Do not make important personal or business decisions  · Do  not drink alcoholic beverages  · If you have not urinated within 8 hours after discharge, please contact your surgeon on call. *  Please give a list of your current medications to your Primary Care Provider. *  Please update this list whenever your medications are discontinued, doses are      changed, or new medications (including over-the-counter products) are added. *  Please carry medication information at all times in case of emergency situations.       These are general instructions for a healthy lifestyle:    No smoking/ No tobacco products/ Avoid exposure to second hand smoke    Surgeon General's Warning:  Quitting smoking now greatly reduces serious risk to your health. Obesity, smoking, and sedentary lifestyle greatly increases your risk for illness    A healthy diet, regular physical exercise & weight monitoring are important for maintaining a healthy lifestyle    You may be retaining fluid if you have a history of heart failure or if you experience any of the following symptoms:  Weight gain of 3 pounds or more overnight or 5 pounds in a week, increased swelling in our hands or feet or shortness of breath while lying flat in bed. Please call your doctor as soon as you notice any of these symptoms; do not wait until your next office visit. Recognize signs and symptoms of STROKE:    F-face looks uneven    A-arms unable to move or move unevenly    S-speech slurred or non-existent    T-time-call 911 as soon as signs and symptoms begin-DO NOT go       Back to bed or wait to see if you get better-TIME IS BRAIN. ACTIVITY  · As tolerated and as directed by your doctor. · Bathe or shower as directed by your doctor. DIET  · Clear liquids until no nausea or vomiting; then light diet for the first day. · Advance to regular diet on second day, unless your doctor orders otherwise. · If nausea and vomiting continues, call your doctor. PAIN  · Take pain medication as directed by your doctor. · Call your doctor if pain is NOT relieved by medication. · DO NOT take aspirin of blood thinners unless directed by your doctor. DRESSING CARE       CALL YOUR DOCTOR IF   · Excessive bleeding that does not stop after holding pressure over the area  · Temperature of 101 degrees F or above  · Excessive redness, swelling or bruising, and/ or green or yellow, smelly discharge from incision    AFTER ANESTHESIA   · For the first 24 hours: DO NOT Drive, Drink alcoholic beverages, or Make important decisions.    · Be aware of dizziness following anesthesia and while taking pain medication. APPOINTMENT DATE/ TIME    YOUR DOCTOR'S PHONE NUMBER       DISCHARGE SUMMARY from Nurse    PATIENT INSTRUCTIONS:    After general anesthesia or intravenous sedation, for 24 hours or while taking prescription Narcotics:  · Limit your activities  · Do not drive and operate hazardous machinery  · Do not make important personal or business decisions  · Do  not drink alcoholic beverages  · If you have not urinated within 8 hours after discharge, please contact your surgeon on call. *  Please give a list of your current medications to your Primary Care Provider. *  Please update this list whenever your medications are discontinued, doses are      changed, or new medications (including over-the-counter products) are added. *  Please carry medication information at all times in case of emergency situations. These are general instructions for a healthy lifestyle:    No smoking/ No tobacco products/ Avoid exposure to second hand smoke    Surgeon General's Warning:  Quitting smoking now greatly reduces serious risk to your health. Obesity, smoking, and sedentary lifestyle greatly increases your risk for illness    A healthy diet, regular physical exercise & weight monitoring are important for maintaining a healthy lifestyle    You may be retaining fluid if you have a history of heart failure or if you experience any of the following symptoms:  Weight gain of 3 pounds or more overnight or 5 pounds in a week, increased swelling in our hands or feet or shortness of breath while lying flat in bed. Please call your doctor as soon as you notice any of these symptoms; do not wait until your next office visit.     Recognize signs and symptoms of STROKE:    F-face looks uneven    A-arms unable to move or move unevenly    S-speech slurred or non-existent    T-time-call 911 as soon as signs and symptoms begin-DO NOT go       Back to bed or wait to see if you get better-TIME IS BRAIN.

## 2018-01-10 NOTE — IP AVS SNAPSHOT
[address] [address] 
[phone] Patient: Zonia Hammond MRN: [id number] RVF:[id number] A check sydnee indicates which time of day the medication should be taken. My Medications CONTINUE taking these medications Instructions Each Dose to Equal  
 Morning Noon Evening Bedtime  
 acetaminophen 325 mg tablet Commonly known as:  TYLENOL Your last dose was: Your next dose is: Take 2 Tabs by mouth every four (4) hours as needed for Fever (for fever greater than 100.4 F). 650 mg  
    
   
   
   
  
 allopurinol 100 mg tablet Commonly known as:  Celi Batty Your last dose was: Your next dose is: Take  by mouth every morning. cephALEXin 500 mg capsule Commonly known as:  Lugene Dover Your last dose was: Your next dose is: Take 1 Cap by mouth four (4) times daily. 500 mg  
    
   
   
   
  
 fludrocortisone 0.1 mg tablet Commonly known as:  FLORINEF Your last dose was: Your next dose is: Take  by mouth nightly. Indications: Symptomatic Orthostatic Hypotension LANTUS SOLOSTAR 100 unit/mL (3 mL) Inpn Generic drug:  insulin glargine Your last dose was: Your next dose is:    
   
   
 5 Units by SubCUTAneous route nightly. 5 Units  
    
   
   
   
  
 lidocaine-prilocain-0.9 % NaCl 2.5 % -2.5 % Kit Your last dose was: Your next dose is:    
   
   
 by Does Not Apply route. Apply to left inner forearm topically one time a day every MWF for numbing dialysis access. midodrine 10 mg tablet Commonly known as:  Shivam Mcbride Your last dose was: Your next dose is: Take 1 Tab by mouth three (3) times daily (with meals) for 30 days. 5 mg MIRALAX 17 gram packet Generic drug:  polyethylene glycol Your last dose was: Your next dose is: Take 17 g by mouth daily as needed. 17 g MUCINEX 600 mg ER tablet Generic drug:  guaiFENesin ER Your last dose was: Your next dose is: Take 1,200 mg by mouth two (2) times a day. 1200 mg NEURONTIN 100 mg capsule Generic drug:  gabapentin Your last dose was: Your next dose is: Take 100 mg by mouth daily. Indications: NEUROPATHIC PAIN  
 100 mg Omega-3 Fatty Acids 300 mg Cap Your last dose was: Your next dose is: Take 1,200 mg by mouth nightly. Last dose 6/20/16  
 1200 mg  
    
   
   
   
  
 OTHER Your last dose was: Your next dose is:    
   
   
 by Nasal route. Calcitonin Gilmer   200 ACT spray once daily in nose  
     
   
   
   
  
 pantoprazole 40 mg tablet Commonly known as:  PROTONIX Your last dose was: Your next dose is: Take 40 mg by mouth daily. 40 mg  
    
   
   
   
  
 PLAVIX 75 mg Tab Generic drug:  clopidogrel Your last dose was: Your next dose is: Take  by mouth daily. LINDA-WAQAR PO Your last dose was: Your next dose is: Take 1 Tab by mouth daily. 1 Tab RENVELA 800 mg Tab tab Generic drug:  sevelamer carbonate Your last dose was: Your next dose is: Take 2,400 mg by mouth three (3) times daily (with meals). 2400 mg SYNTHROID 200 mcg tablet Generic drug:  levothyroxine Your last dose was: Your next dose is: Take 200 mcg by mouth Daily (before breakfast). 200 mcg VITAMIN B-12 1,000 mcg tablet Generic drug:  cyanocobalamin Your last dose was: Your next dose is: Take 1,000 mcg by mouth every morning. 1000 mcg VITAMIN D2 50,000 unit capsule Generic drug:  ergocalciferol Your last dose was: Your next dose is: Take 50,000 Units by mouth. Twice per week [address] Units  
    
   
   
   
  
 warfarin 5 mg tablet Commonly known as:  COUMADIN Your last dose was: Your next dose is: Take 1 Tab by mouth every evening for 30 days. 5 mg

## 2018-01-10 NOTE — ANESTHESIA PREPROCEDURE EVALUATION
Anesthetic History   No history of anesthetic complications            Review of Systems / Medical History  Patient summary reviewed and pertinent labs reviewed    Pulmonary        Sleep apnea: BiPAP           Neuro/Psych       CVA       Cardiovascular    Hypertension: well controlled      CHF  Dysrhythmias : SVT  CAD    Exercise tolerance: <4 METS  Comments: Echo with EF 35-40% and PFO   GI/Hepatic/Renal     GERD: well controlled    Renal disease (HD; mWf): ESRD  Liver disease (hepatomegaly)     Endo/Other    Diabetes: well controlled, type 2, using insulin  Hypothyroidism  Arthritis and anemia     Other Findings   Comments: Gout  Fibromyalgia  Neuropathy  Hx sepsis  Vertigo since stroke  SLE           Physical Exam    Airway  Mallampati: II  TM Distance: 4 - 6 cm  Neck ROM: normal range of motion   Mouth opening: Normal     Cardiovascular  Regular rate and rhythm,  S1 and S2 normal,  no murmur, click, rub, or gallop  Rhythm: regular  Rate: normal         Dental  No notable dental hx       Pulmonary  Breath sounds clear to auscultation               Abdominal  GI exam deferred       Other Findings            Anesthetic Plan    ASA: 4  Anesthesia type: total IV anesthesia and general - backup          Induction: Intravenous  Anesthetic plan and risks discussed with: Patient      Previous note copied forward- patient reports no significant changes in her health since that time    Discussed TIVA with its benefits (lower risk of nausea and sore throat, etc.) and risks including possible awareness, patient understands and elects to proceed

## 2018-01-10 NOTE — IP AVS SNAPSHOT
[address] [facility] 
 
 
 [address] [facility] [address] 
[phone] Patient: Merced Hawkins MRN: [id number] [place] About your hospitalization You were admitted on:  January 10, 2018 You last received care in the:  [facility] PACU You were discharged on:  January 10, 2018 Why you were hospitalized Your primary diagnosis was:  Not on File Follow-up Information Follow up With Details Comments Contact Info Lisa Benavidez MD   [facility] [address] 
[phone] Your Scheduled Appointments 2018 11:30 AM EST Office Visit with Coni Kim MD  
[facility] ([address] [place] [address]) [address] [place] [address]  
[phone]   1:30 PM EST Global Post Op with Boone Miguel NP  
[facility] ([facility]) [address] [place] [id number] [phone] 2018 10:40 AM EST  
(Arrive by 10:10 AM) HOSPITAL with [facility] ([facility]) [address] [facility] [address]  
[phone] Discharge Orders None A check sydnee indicates which time of day the medication should be taken. My Medications CONTINUE taking these medications Instructions Each Dose to Equal  
 Morning Noon Evening Bedtime  
 acetaminophen 325 mg tablet Commonly known as:  TYLENOL Your last dose was: Your next dose is: Take 2 Tabs by mouth every four (4) hours as needed for Fever (for fever greater than 100.4 F). 650 mg  
    
   
   
   
  
 allopurinol 100 mg tablet Commonly known as:  Thien Georges Your last dose was: Your next dose is: Take  by mouth every morning. cephALEXin 500 mg capsule Commonly known as:  Jaclyn Desanctis Your last dose was: Your next dose is: Take 1 Cap by mouth four (4) times daily. 500 mg  
    
   
   
   
  
 fludrocortisone 0.1 mg tablet Commonly known as:  FLORINEF Your last dose was: Your next dose is: Take  by mouth nightly. Indications: Symptomatic Orthostatic Hypotension LANTUS SOLOSTAR 100 unit/mL (3 mL) Inpn Generic drug:  insulin glargine Your last dose was: Your next dose is:    
   
   
 5 Units by SubCUTAneous route nightly. 5 Units  
    
   
   
   
  
 lidocaine-prilocain-0.9 % NaCl 2.5 % -2.5 % Kit Your last dose was: Your next dose is:    
   
   
 by Does Not Apply route. Apply to left inner forearm topically one time a day every MWF for numbing dialysis access. midodrine 10 mg tablet Commonly known as:  Guanaco Silva Your last dose was: Your next dose is: Take 1 Tab by mouth three (3) times daily (with meals) for 30 days. 5 mg MIRALAX 17 gram packet Generic drug:  polyethylene glycol Your last dose was: Your next dose is: Take 17 g by mouth daily as needed. 17 g MUCINEX 600 mg ER tablet Generic drug:  guaiFENesin ER Your last dose was: Your next dose is: Take 1,200 mg by mouth two (2) times a day. 1200 mg NEURONTIN 100 mg capsule Generic drug:  gabapentin Your last dose was: Your next dose is: Take 100 mg by mouth daily. Indications: NEUROPATHIC PAIN  
 100 mg Omega-3 Fatty Acids 300 mg Cap Your last dose was: Your next dose is: Take 1,200 mg by mouth nightly. Last dose 6/20/16  
 1200 mg  
    
   
   
   
  
 OTHER Your last dose was: Your next dose is:    
   
   
 by Nasal route. Calcitonin [place]   200 ACT spray once daily in nose  
     
   
   
   
  
 pantoprazole 40 mg tablet Commonly known as:  PROTONIX Your last dose was: Your next dose is: Take 40 mg by mouth daily. 40 mg  
    
   
   
   
  
 PLAVIX 75 mg Tab Generic drug:  clopidogrel Your last dose was: Your next dose is: Take  by mouth daily. LINDA-WAQAR PO Your last dose was: Your next dose is: Take 1 Tab by mouth daily. 1 Tab RENVELA 800 mg Tab tab Generic drug:  sevelamer carbonate Your last dose was: Your next dose is: Take 2,400 mg by mouth three (3) times daily (with meals). 2400 mg SYNTHROID 200 mcg tablet Generic drug:  levothyroxine Your last dose was: Your next dose is: Take 200 mcg by mouth Daily (before breakfast). 200 mcg VITAMIN B-12 1,000 mcg tablet Generic drug:  cyanocobalamin Your last dose was: Your next dose is: Take 1,000 mcg by mouth every morning. 1000 mcg VITAMIN D2 50,000 unit capsule Generic drug:  ergocalciferol Your last dose was: Your next dose is: Take 50,000 Units by mouth. Twice per week 53388 Units  
    
   
   
   
  
 warfarin 5 mg tablet Commonly known as:  COUMADIN Your last dose was: Your next dose is: Take 1 Tab by mouth every evening for 30 days. 5 mg Discharge Instructions Dr Richelle Dong [phone] F/U appt 1/25/2018 at 1:30 
 
ACTIVITY · As tolerated and as directed by your doctor. · Bathe or shower as directed by your doctor. DIET · Clear liquids until no nausea or vomiting; then light diet for the first day. · Advance to regular diet on second day, unless your doctor orders otherwise. · If nausea and vomiting continues, call your doctor. PAIN 
· Take pain medication as directed by your doctor. · Call your doctor if pain is NOT relieved by medication. · DO NOT take aspirin of blood thinners unless directed by your doctor. DRESSING CARE  
 
 
CALL YOUR DOCTOR IF  
· Excessive bleeding that does not stop after holding pressure over the area · Temperature of 101 degrees F or above · Excessive redness, swelling or bruising, and/ or green or yellow, smelly discharge from incision AFTER ANESTHESIA · For the first 24 hours: DO NOT Drive, Drink alcoholic beverages, or Make important decisions. · Be aware of dizziness following anesthesia and while taking pain medication. APPOINTMENT DATE/ TIME 
 
YOUR DOCTOR'S PHONE NUMBER  
 
 
DISCHARGE SUMMARY from Nurse PATIENT INSTRUCTIONS: 
 
After general anesthesia or intravenous sedation, for 24 hours or while taking prescription Narcotics: · Limit your activities · Do not drive and operate hazardous machinery · Do not make important personal or business decisions · Do  not drink alcoholic beverages · If you have not urinated within 8 hours after discharge, please contact your surgeon on call. *  Please give a list of your current medications to your Primary Care Provider. *  Please update this list whenever your medications are discontinued, doses are 
    changed, or new medications (including over-the-counter products) are added. *  Please carry medication information at all times in case of emergency situations. These are general instructions for a healthy lifestyle: No smoking/ No tobacco products/ Avoid exposure to second hand smoke Surgeon General's Warning:  Quitting smoking now greatly reduces serious risk to your health. Obesity, smoking, and sedentary lifestyle greatly increases your risk for illness A healthy diet, regular physical exercise & weight monitoring are important for maintaining a healthy lifestyle You may be retaining fluid if you have a history of heart failure or if you experience any of the following symptoms:  Weight gain of 3 pounds or more overnight or 5 pounds in a week, increased swelling in our hands or feet or shortness of breath while lying flat in bed. Please call your doctor as soon as you notice any of these symptoms; do not wait until your next office visit. Recognize signs and symptoms of STROKE: 
 
F-face looks uneven A-arms unable to move or move unevenly S-speech slurred or non-existent T-time-call 911 as soon as signs and symptoms begin-DO NOT go Back to bed or wait to see if you get better-TIME IS BRAIN. ACTIVITY · As tolerated and as directed by your doctor. · Bathe or shower as directed by your doctor. DIET · Clear liquids until no nausea or vomiting; then light diet for the first day. · Advance to regular diet on second day, unless your doctor orders otherwise. · If nausea and vomiting continues, call your doctor. PAIN 
· Take pain medication as directed by your doctor. · Call your doctor if pain is NOT relieved by medication. · DO NOT take aspirin of blood thinners unless directed by your doctor. DRESSING CARE  
 
 
CALL YOUR DOCTOR IF  
· Excessive bleeding that does not stop after holding pressure over the area · Temperature of 101 degrees F or above · Excessive redness, swelling or bruising, and/ or green or yellow, smelly discharge from incision AFTER ANESTHESIA · For the first 24 hours: DO NOT Drive, Drink alcoholic beverages, or Make important decisions. · Be aware of dizziness following anesthesia and while taking pain medication. APPOINTMENT DATE/ TIME 
 
YOUR DOCTOR'S PHONE NUMBER  
 
 
DISCHARGE SUMMARY from Nurse PATIENT INSTRUCTIONS: 
 
 After general anesthesia or intravenous sedation, for 24 hours or while taking prescription Narcotics: · Limit your activities · Do not drive and operate hazardous machinery · Do not make important personal or business decisions · Do  not drink alcoholic beverages · If you have not urinated within 8 hours after discharge, please contact your surgeon on call. *  Please give a list of your current medications to your Primary Care Provider. *  Please update this list whenever your medications are discontinued, doses are 
    changed, or new medications (including over-the-counter products) are added. *  Please carry medication information at all times in case of emergency situations. These are general instructions for a healthy lifestyle: No smoking/ No tobacco products/ Avoid exposure to second hand smoke Surgeon General's Warning:  Quitting smoking now greatly reduces serious risk to your health. Obesity, smoking, and sedentary lifestyle greatly increases your risk for illness A healthy diet, regular physical exercise & weight monitoring are important for maintaining a healthy lifestyle You may be retaining fluid if you have a history of heart failure or if you experience any of the following symptoms:  Weight gain of 3 pounds or more overnight or 5 pounds in a week, increased swelling in our hands or feet or shortness of breath while lying flat in bed. Please call your doctor as soon as you notice any of these symptoms; do not wait until your next office visit. Recognize signs and symptoms of STROKE: 
 
F-face looks uneven A-arms unable to move or move unevenly S-speech slurred or non-existent T-time-call 911 as soon as signs and symptoms begin-DO NOT go Back to bed or wait to see if you get better-TIME IS BRAIN. Introducing [facility]! Dear Angel Luis Grant: Thank you for requesting a [employer] account.   Our records indicate that you have previously registered for a Regal[employer] account but its currently inactive. Please call our Regalister support line at [phone]. Additional Information If you have questions, please visit the Frequently Asked Questions section of the [employer] website at [email or web address] Remember, [employer] is NOT to be used for urgent needs. For medical emergencies, dial 911. Now available from your iPhone and Android! Unresulted Labs-Please follow up with your PCP about these lab tests Order Current Status IR ANGIO EXT UPPER LT In process Providers Seen During Your Hospitalization Provider Specialty Primary office phone Vicki Chou MD Vascular Surgery [phone] Your Primary Care Physician (PCP) Primary Care Physician Office Phone Office Fax [facility], [address] You are allergic to the following Allergen Reactions Codeine Other (comments) Pt can not recall type of reaction -- only remembers being told allergic Recent Documentation Height Weight BMI OB Status Smoking Status 1.6 m 89.8 kg 35.07 kg/m2 Hysterectomy Never Smoker Emergency Contacts Name Discharge Info Relation Home Work Mobile Jean Pierre Adams  Spouse [3] [phone] Patient Belongings The following personal items are in your possession at time of discharge: 
  Dental Appliances: None         Home Medications: None   Jewelry: None  Clothing: Shirt, Pants, Sweater    Other Valuables: None Please provide this summary of care documentation to your next provider. Signatures-by signing, you are acknowledging that this After Visit Summary has been reviewed with you and you have received a copy. Patient Signature:  ____________________________________________________________  Date:  ____________________________________________________________  
  
Zhanna Sleeper    
    
 Provider Signature:  ____________________________________________________________ Date:  ____________________________________________________________

## 2018-01-10 NOTE — BRIEF OP NOTE
BRIEF OPERATIVE NOTE    Date of Procedure: 1/10/2018   Preoperative Diagnosis: Dialysis AV [facility] [T82.590A]  Postoperative Diagnosis: Dialysis AV fistula malfunction, initial encounter ([facility] [T82.590A]    Procedure(s):  LEFT UPPER EXTREMITY FISTULAGRAM  Surgeon(s) and Role:     * Tere Mtz MD - Primary         Assistant Staff:       Surgical Staff:  Circ-1: Margaret Magana RN  Circ-Relief: Jasmina Diaz RN  Radiology Technician: Valdo Wallace, RT, R, CT; Brenton Zaragoza, RT, R, CT  Event Time In   Incision Start 1322   Incision Close 1343     Anesthesia: General   Estimated Blood Loss: 10cc   Specimens: * No specimens in log *   Findings: Subclavian/inominant  vein  occlusion  Complications: None  Implants: * No implants in log *

## 2018-01-10 NOTE — PERIOP NOTES
Report called to Sara FRAIRE at [facility], pt is retuning via [facility]

## 2018-01-10 NOTE — ANESTHESIA POSTPROCEDURE EVALUATION
Post-Anesthesia Evaluation and Assessment    Patient: Severa Curry MRN: [id number]  SSN: [id number]    YOB: 1945  Age: 81 y.o. Sex: female       Cardiovascular Function/Vital Signs  Visit Vitals    /59    Pulse 68    Temp 36.7 °C (98.1 °F)    Resp 11    Ht 5' 3\" (1.6 m)    Wt 89.8 kg (197 lb 15.6 oz)    SpO2 94%    BMI 35.07 kg/m2       Patient is status post total IV anesthesia, general - backup anesthesia for Procedure(s):  LEFT UPPER EXTREMITY FISTULAGRAM.    Nausea/Vomiting: None    Postoperative hydration reviewed and adequate. Pain:  Pain Scale 1: Numeric (0 - 10) (01/10/18 0957)  Pain Intensity 1: 5 (01/10/18 0957)   Managed    Neurological Status:   Neuro (WDL): Within Defined Limits (01/10/18 1435)   At baseline    Mental Status and Level of Consciousness: Alert and oriented     Pulmonary Status:   O2 Device: Nasal cannula (01/10/18 1432)   Adequate oxygenation and airway patent    Complications related to anesthesia: None    Post-anesthesia assessment completed.  No concerns    Signed By: Katharina Perez MD     January 10, 2018

## 2018-01-11 NOTE — OP NOTES
[facility]  OPERATIVE REPORT    Marleny Patterson  MR#: [id number]  : 1945  ACCOUNT #: [de-identified]   DATE OF SERVICE: 01/10/2018    PREOPERATIVE DIAGNOSIS:  Left upper extremity edema, end-stage renal disease with a left forearm arteriovenous graft. PROCEDURE PERFORMED:  Left upper extremity fistulogram/venogram.    SURGEON:  Lan Cohen MD.    ANESTHESIA:  IV sedation plus 1% lidocaine local.      TOTAL CONTRAST: 30 mL of Isovue 300. TOTAL FLUOROSCOPY TIME: 4 minutes. DESCRIPTION OF PROCEDURE:  The patient brought to the Endo suite, placed on the table in supine position. Following adequate IV sedation and a timeout procedure, the left arm was draped and prepped in sterile fashion. 1% lidocaine was then used to anesthetize the skin and subcutaneous tissue overlying the venous limb of the graft, which was lateral.  The venous limb of the graft was then percutaneously punctured via micropuncture technique. Ultimately, a 6-Korean sheath was placed over a guidewire. From this position, a fistulogram was performed, followed by a venogram of the left upper extremity and central veins. A KMP catheter was advanced over the guidewire and positioned at the level of the axillary vein to achieve improved images of the central venous system. Once adequate images had been obtained and no further intervention was felt to be indicated, the sheath was removed and direct pressure was held. Hemostasis was achieved. The patient tolerated the procedure well. No complications. ANGIOGRAPHIC FINDINGS:  The AV graft is widely patent. There does appear to be a small, perhaps venous aneurysm just beyond the termination of the AV graft at the venous anastomosis. The venous outflow via the brachial vein is normal in appearance. There is no evidence of stenosis within this vein itself. As the vein reaches the axillary and then subclavian vein, there is an abrupt occlusion.   There are multiple well-developed robust collaterals around the shoulder that ultimately refill the subclavian and SVC. There is no contrast opacification of the subclavian or innominate vein on this side. IMPRESSION:  Chronic central venous occlusion.       MD Christina Sanz / Ruby Allen  D: 01/10/2018 17:00     T: 01/10/2018 18:08  JOB #: [id number]

## 2018-01-17 ENCOUNTER — PATIENT OUTREACH (OUTPATIENT)
Dept: CASE MANAGEMENT | Age: 73
End: 2018-01-17

## 2018-02-08 ENCOUNTER — ANESTHESIA EVENT (OUTPATIENT)
Dept: SURGERY | Age: 73
End: 2018-02-08
Payer: MEDICARE

## 2018-02-08 NOTE — PERIOP NOTES
Per Martin Cummings NP patient to hold Plavix day of surgery. Carlos Butts, nurse  at [facility] notified.

## 2018-02-08 NOTE — PERIOP NOTES
Spoke with Shreya Alexis at Dr Duane Banegas office and he is fine to proceed with surgery with patient holding plavix today and tomorrow. Amanda Nice

## 2018-02-08 NOTE — PERIOP NOTES
Pre op instructions faxed to [facility] at [facility] RN's request.

## 2018-02-09 ENCOUNTER — HOSPITAL ENCOUNTER (OUTPATIENT)
Age: 73
Setting detail: OUTPATIENT SURGERY
Discharge: HOME | End: 2018-02-09
Attending: SURGERY | Admitting: SURGERY
Payer: MEDICARE

## 2018-02-09 ENCOUNTER — ANESTHESIA (OUTPATIENT)
Dept: SURGERY | Age: 73
End: 2018-02-09
Payer: MEDICARE

## 2018-02-09 VITALS
BODY MASS INDEX: 35.75 KG/M2 | HEIGHT: 63 IN | DIASTOLIC BLOOD PRESSURE: 62 MMHG | OXYGEN SATURATION: 100 % | WEIGHT: 201.75 LBS | RESPIRATION RATE: 16 BRPM | TEMPERATURE: 98 F | HEART RATE: 71 BPM | SYSTOLIC BLOOD PRESSURE: 132 MMHG

## 2018-02-09 LAB
GLUCOSE BLD STRIP.AUTO-MCNC: 97 MG/DL (ref 65–100)
HGB BLD-MCNC: 11.5 G/DL (ref 11.7–15.4)
INR BLD: 1.2 (ref 0.9–1.2)
POTASSIUM BLD-SCNC: 3.7 MMOL/L (ref 3.5–5.1)
PT BLD: 13.8 SECS (ref 9.6–11.6)

## 2018-02-09 PROCEDURE — 76060000035 HC ANESTHESIA 2 TO 2.5 HR: Performed by: SURGERY

## 2018-02-09 PROCEDURE — 77030002996 HC SUT SLK J&J -A: Performed by: SURGERY

## 2018-02-09 PROCEDURE — 74011250636 HC RX REV CODE- 250/636

## 2018-02-09 PROCEDURE — 76210000020 HC REC RM PH II FIRST 0.5 HR: Performed by: SURGERY

## 2018-02-09 PROCEDURE — 77030010512 HC APPL CLP LIG J&J -C: Performed by: SURGERY

## 2018-02-09 PROCEDURE — 74011000250 HC RX REV CODE- 250: Performed by: SURGERY

## 2018-02-09 PROCEDURE — 77030031139 HC SUT VCRL2 J&J -A: Performed by: SURGERY

## 2018-02-09 PROCEDURE — 77030002916 HC SUT ETHLN J&J -A: Performed by: SURGERY

## 2018-02-09 PROCEDURE — 77030020782 HC GWN BAIR PAWS FLX 3M -B: Performed by: ANESTHESIOLOGY

## 2018-02-09 PROCEDURE — 84132 ASSAY OF SERUM POTASSIUM: CPT

## 2018-02-09 PROCEDURE — 74011000250 HC RX REV CODE- 250

## 2018-02-09 PROCEDURE — 76210000017 HC OR PH I REC 1.5 TO 2 HR: Performed by: SURGERY

## 2018-02-09 PROCEDURE — 77030016570 HC BLNKT BAIR HGGR 3M -B: Performed by: ANESTHESIOLOGY

## 2018-02-09 PROCEDURE — 77030018673: Performed by: SURGERY

## 2018-02-09 PROCEDURE — 85018 HEMOGLOBIN: CPT | Performed by: ANESTHESIOLOGY

## 2018-02-09 PROCEDURE — 74011250636 HC RX REV CODE- 250/636: Performed by: ANESTHESIOLOGY

## 2018-02-09 PROCEDURE — C1768 GRAFT, VASCULAR: HCPCS | Performed by: SURGERY

## 2018-02-09 PROCEDURE — 77030002987 HC SUT PROL J&J -B: Performed by: SURGERY

## 2018-02-09 PROCEDURE — 74011250636 HC RX REV CODE- 250/636: Performed by: SURGERY

## 2018-02-09 PROCEDURE — 77030008703 HC TU ET UNCUF COVD -A: Performed by: ANESTHESIOLOGY

## 2018-02-09 PROCEDURE — 77030008477 HC STYL SATN SLP COVD -A: Performed by: ANESTHESIOLOGY

## 2018-02-09 PROCEDURE — 74011250637 HC RX REV CODE- 250/637: Performed by: ANESTHESIOLOGY

## 2018-02-09 PROCEDURE — 76010000171 HC OR TIME 2 TO 2.5 HR INTENSV-TIER 1: Performed by: SURGERY

## 2018-02-09 PROCEDURE — 77030011640 HC PAD GRND REM COVD -A: Performed by: SURGERY

## 2018-02-09 PROCEDURE — 77030034888 HC SUT PROL 2 J&J -B: Performed by: SURGERY

## 2018-02-09 PROCEDURE — 85610 PROTHROMBIN TIME: CPT

## 2018-02-09 PROCEDURE — 82962 GLUCOSE BLOOD TEST: CPT

## 2018-02-09 DEVICE — GRAFT VASC L40CM ID6MM EPTFE STD WALLED NONRINGED STR GOR: Type: IMPLANTABLE DEVICE | Site: ARM | Status: FUNCTIONAL

## 2018-02-09 RX ORDER — SODIUM CHLORIDE, SODIUM LACTATE, POTASSIUM CHLORIDE, CALCIUM CHLORIDE 600; 310; 30; 20 MG/100ML; MG/100ML; MG/100ML; MG/100ML
100 INJECTION, SOLUTION INTRAVENOUS CONTINUOUS
Status: DISCONTINUED | OUTPATIENT
Start: 2018-02-09 | End: 2018-02-09 | Stop reason: HOSPADM

## 2018-02-09 RX ORDER — SODIUM CHLORIDE 0.9 % (FLUSH) 0.9 %
5-10 SYRINGE (ML) INJECTION AS NEEDED
Status: DISCONTINUED | OUTPATIENT
Start: 2018-02-09 | End: 2018-02-09 | Stop reason: HOSPADM

## 2018-02-09 RX ORDER — EPHEDRINE SULFATE 50 MG/ML
INJECTION, SOLUTION INTRAVENOUS AS NEEDED
Status: DISCONTINUED | OUTPATIENT
Start: 2018-02-09 | End: 2018-02-09 | Stop reason: HOSPADM

## 2018-02-09 RX ORDER — ONDANSETRON 2 MG/ML
INJECTION INTRAMUSCULAR; INTRAVENOUS AS NEEDED
Status: DISCONTINUED | OUTPATIENT
Start: 2018-02-09 | End: 2018-02-09 | Stop reason: HOSPADM

## 2018-02-09 RX ORDER — MIDAZOLAM HYDROCHLORIDE 1 MG/ML
2 INJECTION, SOLUTION INTRAMUSCULAR; INTRAVENOUS ONCE
Status: DISCONTINUED | OUTPATIENT
Start: 2018-02-09 | End: 2018-02-09 | Stop reason: HOSPADM

## 2018-02-09 RX ORDER — FENTANYL CITRATE 50 UG/ML
INJECTION, SOLUTION INTRAMUSCULAR; INTRAVENOUS AS NEEDED
Status: DISCONTINUED | OUTPATIENT
Start: 2018-02-09 | End: 2018-02-09 | Stop reason: HOSPADM

## 2018-02-09 RX ORDER — HYDROMORPHONE HYDROCHLORIDE 2 MG/ML
0.5 INJECTION, SOLUTION INTRAMUSCULAR; INTRAVENOUS; SUBCUTANEOUS
Status: DISCONTINUED | OUTPATIENT
Start: 2018-02-09 | End: 2018-02-09 | Stop reason: HOSPADM

## 2018-02-09 RX ORDER — DIPHENHYDRAMINE HYDROCHLORIDE 50 MG/ML
12.5 INJECTION, SOLUTION INTRAMUSCULAR; INTRAVENOUS
Status: DISCONTINUED | OUTPATIENT
Start: 2018-02-09 | End: 2018-02-09 | Stop reason: HOSPADM

## 2018-02-09 RX ORDER — SODIUM CHLORIDE 9 MG/ML
25 INJECTION, SOLUTION INTRAVENOUS CONTINUOUS
Status: DISCONTINUED | OUTPATIENT
Start: 2018-02-09 | End: 2018-02-09 | Stop reason: HOSPADM

## 2018-02-09 RX ORDER — HEPARIN SODIUM 1000 [USP'U]/ML
INJECTION, SOLUTION INTRAVENOUS; SUBCUTANEOUS AS NEEDED
Status: DISCONTINUED | OUTPATIENT
Start: 2018-02-09 | End: 2018-02-09 | Stop reason: HOSPADM

## 2018-02-09 RX ORDER — HEPARIN SODIUM 5000 [USP'U]/ML
INJECTION, SOLUTION INTRAVENOUS; SUBCUTANEOUS AS NEEDED
Status: DISCONTINUED | OUTPATIENT
Start: 2018-02-09 | End: 2018-02-09 | Stop reason: HOSPADM

## 2018-02-09 RX ORDER — DEXAMETHASONE SODIUM PHOSPHATE 4 MG/ML
INJECTION, SOLUTION INTRA-ARTICULAR; INTRALESIONAL; INTRAMUSCULAR; INTRAVENOUS; SOFT TISSUE AS NEEDED
Status: DISCONTINUED | OUTPATIENT
Start: 2018-02-09 | End: 2018-02-09 | Stop reason: HOSPADM

## 2018-02-09 RX ORDER — CEFAZOLIN SODIUM/WATER 2 G/20 ML
2 SYRINGE (ML) INTRAVENOUS
Status: COMPLETED | OUTPATIENT
Start: 2018-02-09 | End: 2018-02-09

## 2018-02-09 RX ORDER — LIDOCAINE HYDROCHLORIDE 20 MG/ML
INJECTION, SOLUTION EPIDURAL; INFILTRATION; INTRACAUDAL; PERINEURAL AS NEEDED
Status: DISCONTINUED | OUTPATIENT
Start: 2018-02-09 | End: 2018-02-09 | Stop reason: HOSPADM

## 2018-02-09 RX ORDER — FLUMAZENIL 0.1 MG/ML
0.2 INJECTION INTRAVENOUS
Status: DISCONTINUED | OUTPATIENT
Start: 2018-02-09 | End: 2018-02-09 | Stop reason: HOSPADM

## 2018-02-09 RX ORDER — ACETAMINOPHEN 500 MG
1000 TABLET ORAL ONCE
Status: DISCONTINUED | OUTPATIENT
Start: 2018-02-09 | End: 2018-02-09 | Stop reason: HOSPADM

## 2018-02-09 RX ORDER — PROTAMINE SULFATE 10 MG/ML
INJECTION, SOLUTION INTRAVENOUS AS NEEDED
Status: DISCONTINUED | OUTPATIENT
Start: 2018-02-09 | End: 2018-02-09 | Stop reason: HOSPADM

## 2018-02-09 RX ORDER — NALOXONE HYDROCHLORIDE 0.4 MG/ML
0.2 INJECTION, SOLUTION INTRAMUSCULAR; INTRAVENOUS; SUBCUTANEOUS AS NEEDED
Status: DISCONTINUED | OUTPATIENT
Start: 2018-02-09 | End: 2018-02-09 | Stop reason: HOSPADM

## 2018-02-09 RX ORDER — SODIUM CHLORIDE 0.9 % (FLUSH) 0.9 %
5-10 SYRINGE (ML) INJECTION EVERY 8 HOURS
Status: DISCONTINUED | OUTPATIENT
Start: 2018-02-09 | End: 2018-02-09 | Stop reason: HOSPADM

## 2018-02-09 RX ORDER — PROPOFOL 10 MG/ML
INJECTION, EMULSION INTRAVENOUS AS NEEDED
Status: DISCONTINUED | OUTPATIENT
Start: 2018-02-09 | End: 2018-02-09 | Stop reason: HOSPADM

## 2018-02-09 RX ORDER — ROCURONIUM BROMIDE 10 MG/ML
INJECTION, SOLUTION INTRAVENOUS AS NEEDED
Status: DISCONTINUED | OUTPATIENT
Start: 2018-02-09 | End: 2018-02-09 | Stop reason: HOSPADM

## 2018-02-09 RX ORDER — LIDOCAINE HYDROCHLORIDE 10 MG/ML
0.1 INJECTION INFILTRATION; PERINEURAL AS NEEDED
Status: DISCONTINUED | OUTPATIENT
Start: 2018-02-09 | End: 2018-02-09 | Stop reason: HOSPADM

## 2018-02-09 RX ORDER — FENTANYL CITRATE 50 UG/ML
100 INJECTION, SOLUTION INTRAMUSCULAR; INTRAVENOUS ONCE
Status: DISCONTINUED | OUTPATIENT
Start: 2018-02-09 | End: 2018-02-09 | Stop reason: HOSPADM

## 2018-02-09 RX ORDER — TRAMADOL HYDROCHLORIDE 50 MG/1
50 TABLET ORAL ONCE
Status: COMPLETED | OUTPATIENT
Start: 2018-02-09 | End: 2018-02-09

## 2018-02-09 RX ORDER — MIDAZOLAM HYDROCHLORIDE 1 MG/ML
2 INJECTION, SOLUTION INTRAMUSCULAR; INTRAVENOUS
Status: DISCONTINUED | OUTPATIENT
Start: 2018-02-09 | End: 2018-02-09 | Stop reason: HOSPADM

## 2018-02-09 RX ORDER — SUCCINYLCHOLINE CHLORIDE 20 MG/ML
INJECTION INTRAMUSCULAR; INTRAVENOUS AS NEEDED
Status: DISCONTINUED | OUTPATIENT
Start: 2018-02-09 | End: 2018-02-09 | Stop reason: HOSPADM

## 2018-02-09 RX ORDER — BUPIVACAINE HYDROCHLORIDE 2.5 MG/ML
INJECTION, SOLUTION EPIDURAL; INFILTRATION; INTRACAUDAL AS NEEDED
Status: DISCONTINUED | OUTPATIENT
Start: 2018-02-09 | End: 2018-02-09 | Stop reason: HOSPADM

## 2018-02-09 RX ADMIN — EPHEDRINE SULFATE 5 MG: 50 INJECTION, SOLUTION INTRAVENOUS at 11:11

## 2018-02-09 RX ADMIN — PROPOFOL 140 MG: 10 INJECTION, EMULSION INTRAVENOUS at 10:43

## 2018-02-09 RX ADMIN — HEPARIN SODIUM 3000 UNITS: 1000 INJECTION, SOLUTION INTRAVENOUS; SUBCUTANEOUS at 11:20

## 2018-02-09 RX ADMIN — Medication 2 G: at 10:51

## 2018-02-09 RX ADMIN — ROCURONIUM BROMIDE 5 MG: 10 INJECTION, SOLUTION INTRAVENOUS at 10:43

## 2018-02-09 RX ADMIN — DEXAMETHASONE SODIUM PHOSPHATE 4 MG: 4 INJECTION, SOLUTION INTRA-ARTICULAR; INTRALESIONAL; INTRAMUSCULAR; INTRAVENOUS; SOFT TISSUE at 11:05

## 2018-02-09 RX ADMIN — LIDOCAINE HYDROCHLORIDE 40 MG: 20 INJECTION, SOLUTION EPIDURAL; INFILTRATION; INTRACAUDAL; PERINEURAL at 10:43

## 2018-02-09 RX ADMIN — TRAMADOL HYDROCHLORIDE 50 MG: 50 TABLET, FILM COATED ORAL at 14:45

## 2018-02-09 RX ADMIN — SODIUM CHLORIDE 25 ML/HR: 900 INJECTION, SOLUTION INTRAVENOUS at 09:10

## 2018-02-09 RX ADMIN — PROTAMINE SULFATE 20 MG: 10 INJECTION, SOLUTION INTRAVENOUS at 12:12

## 2018-02-09 RX ADMIN — FENTANYL CITRATE 100 MCG: 50 INJECTION, SOLUTION INTRAMUSCULAR; INTRAVENOUS at 10:43

## 2018-02-09 RX ADMIN — ONDANSETRON 4 MG: 2 INJECTION INTRAMUSCULAR; INTRAVENOUS at 12:13

## 2018-02-09 RX ADMIN — SUCCINYLCHOLINE CHLORIDE 160 MG: 20 INJECTION INTRAMUSCULAR; INTRAVENOUS at 10:43

## 2018-02-09 RX ADMIN — EPHEDRINE SULFATE 5 MG: 50 INJECTION, SOLUTION INTRAVENOUS at 11:21

## 2018-02-09 NOTE — PERIOP NOTES
TRANSFER - OUT REPORT:    Verbal report given to [employer]) on [employer]  being transferred to [facility](unit) for routine post - op       Report consisted of patients Situation, Background, Assessment and   Recommendations(SBAR). Information from the following report(s) SBAR, OR Summary, Intake/Output and MAR was reviewed with the receiving nurse. Lines:   Peripheral IV 02/09/18 Left; Lower Leg (Active)   Site Assessment Clean, dry, & intact 2/9/2018 12:52 PM   Phlebitis Assessment 0 2/9/2018 12:52 PM   Infiltration Assessment 0 2/9/2018 12:52 PM   Dressing Status Clean, dry, & intact 2/9/2018 12:52 PM   Dressing Type Transparent 2/9/2018 12:52 PM   Hub Color/Line Status Pink; Infusing 2/9/2018 12:52 PM        Opportunity for questions and clarification was provided.       Patient transported with:   O2 @ 3 liters via [facility]

## 2018-02-09 NOTE — PERIOP NOTES
Received to pre op via stretcher transport by [address] [place] [address] ([facility]) [phone].  Pt is from [facility] in [place], [place]

## 2018-02-09 NOTE — IP AVS SNAPSHOT
[address] [facility] 
 
 
 [address] [facility] [address] 
[phone] Patient: Nesha Moreno MRN: [id number] WIX:[id number] About your hospitalization You were admitted on:  February 9, 2018 You last received care in the:  [facility] PACU You were discharged on:  February 9, 2018 Why you were hospitalized Your primary diagnosis was:  Not on File Follow-up Information Follow up With Details Comments Contact Info Ida Isidro MD Call today keep already scheduled follow up appointment [place] [address] [facility] [address] 
[phone] Wynetta Cogan, MD   [facility] [address] 
[phone] Your Scheduled Appointments Tuesday February 27, 2018 10:40 AM EST  
(Arrive by 10:10 AM) HOSPITAL with [facility] ([facility]) [address] [place] [address] [place] [address]  
[phone] Tuesday February 27, 2018  1:45 PM EST Global Post Op with Maria L Miguel NP  
[facility] ([facility]) [address] [place], [place] [address] [phone] Discharge Orders None A check sydnee indicates which time of day the medication should be taken. My Medications CONTINUE taking these medications Instructions Each Dose to Equal  
 Morning Noon Evening Bedtime  
 acetaminophen 325 mg tablet Commonly known as:  TYLENOL Your last dose was: Your next dose is: Take 2 Tabs by mouth every four (4) hours as needed for Fever (for fever greater than 100.4 F). 650 mg  
    
   
   
   
  
 allopurinol 100 mg tablet Commonly known as:  Elizabeth England Your last dose was: Your next dose is: Take  by mouth every morning. ANUSOL-HC 25 mg Supp Generic drug:  hydrocortisone Your last dose was: Your next dose is: Insert 25 mg into rectum as needed. 25 mg  
    
   
   
   
  
 bipap machine kit Your last dose was: Your next dose is:    
   
   
 by Does Not Apply route nightly. calcitonin (salmon) nasal  
Commonly known as:  Geraline Decree Your last dose was: Your next dose is:    
   
   
 1 Arbuckle by IntraNASal route daily. 1 Spray * calcium carbonate 200 mg calcium (500 mg) Chew Commonly known as:  TUMS Your last dose was: Your next dose is: Take 1 Tab by mouth daily. 1 Tab * calcium carbonate 200 mg calcium (500 mg) Chew Commonly known as:  TUMS Your last dose was: Your next dose is: Take 1 Tab by mouth daily. 1 Tab COUMADIN 3 mg tablet Generic drug:  warfarin Your last dose was: Your next dose is: Take 3 mg by mouth daily as needed [facility] - last dose recorded on [facility] 2/3/18). 3 mg DULCOLAX (BISACODYL) 10 mg suppository Generic drug:  bisacodyl Your last dose was: Your next dose is: Insert 10 mg into rectum daily. 10 mg  
    
   
   
   
  
 fludrocortisone 0.1 mg tablet Commonly known as:  FLORINEF Your last dose was: Your next dose is: Take  by mouth nightly. Indications: Symptomatic Orthostatic Hypotension HEMORRHOID rectal ointment Generic drug:  phenyleph-shark marcell oil-mo-pet Your last dose was: Your next dose is:    
   
   
 by PeriANAL route every six (6) hours as needed for Hemorrhoids. LANTUS SOLOSTAR 100 unit/mL (3 mL) Inpn Generic drug:  insulin glargine Your last dose was: Your next dose is: 5 Units by SubCUTAneous route nightly. 5 Units  
    
   
   
   
  
 lidocaine-prilocain-0.9 % NaCl 2.5 % -2.5 % Kit Your last dose was: Your next dose is:    
   
   
 by Does Not Apply route. Apply to left inner forearm topically one time a day every MWF for numbing dialysis access. midodrine 10 mg tablet Commonly known as:  Bonnetta Aus Your last dose was: Your next dose is: Take  by mouth three (3) times daily. MIRALAX 17 gram packet Generic drug:  polyethylene glycol Your last dose was: Your next dose is: Take 17 g by mouth daily as needed. 17 g MUCINEX 600 mg ER tablet Generic drug:  guaiFENesin ER Your last dose was: Your next dose is: Take 1,200 mg by mouth two (2) times a day. 1200 mg NEURONTIN 100 mg capsule Generic drug:  gabapentin Your last dose was: Your next dose is: Take 100 mg by mouth daily. Indications: NEUROPATHIC PAIN  
 100 mg Omega-3 Fatty Acids 300 mg Cap Your last dose was: Your next dose is: Take 1,200 mg by mouth nightly. Last dose 6/20/16  
 1200 mg  
    
   
   
   
  
 omeprazole 20 mg capsule Commonly known as:  PRILOSEC Your last dose was: Your next dose is: Take 20 mg by mouth daily. 20 mg Oxygen Your last dose was: Your next dose is:    
   
   
 3 LPM via NC  
     
   
   
   
  
 BigDoor MILK OF MAGNESIA 400 mg/5 mL suspension Generic drug:  magnesium hydroxide Your last dose was: Your next dose is: Take 30 mL by mouth daily as needed for Constipation. 30 mL PLAVIX 75 mg Tab Generic drug:  clopidogrel Your last dose was: Your next dose is: Take  by mouth daily. LINDA-WAQAR PO Your last dose was: Your next dose is: Take 1 Tab by mouth daily. 1 Tab RENVELA 800 mg Tab tab Generic drug:  sevelamer carbonate Your last dose was: Your next dose is: Take 2,400 mg by mouth three (3) times daily (with meals). 2400 mg SYNTHROID 200 mcg tablet Generic drug:  levothyroxine Your last dose was: Your next dose is: Take 200 mcg by mouth Daily (before breakfast). 200 mcg VITAMIN B-12 1,000 mcg tablet Generic drug:  cyanocobalamin Your last dose was: Your next dose is: Take 1,000 mcg by mouth every morning. 1000 mcg VITAMIN D2 50,000 unit capsule Generic drug:  ergocalciferol Your last dose was: Your next dose is: Take 50,000 Units by mouth. Twice per week [address] Units ZOFRAN (AS HYDROCHLORIDE) 4 mg tablet Generic drug:  ondansetron hcl Your last dose was: Your next dose is: Take 4 mg by mouth every eight (8) hours as needed for Nausea. 4 mg * Notice: This list has 2 medication(s) that are the same as other medications prescribed for you. Read the directions carefully, and ask your doctor or other care provider to review them with you. ASK your doctor about these medications Instructions Each Dose to Equal  
 Morning Noon Evening Bedtime LOVENOX 80 mg/0.8 mL injection Generic drug:  enoxaparin Ask about: Should I take this medication? Your last dose was: Your next dose is:    
   
   
 80 mg by SubCUTAneous route daily. 80 mg Discharge Instructions INSTRUCTIONS FOR A-V FISTULA, GRAFT ACCESS, REVISION OR DECLOT Blood Pressures may only be taken in pt's leg!! Restart Coumadin tomorrow 2/10 ACTIVITY · As tolerated and as directed by your doctor. · Keep arm straight and raised above heart level for the next 24 hours. DIET · Clear liquids until no nausea or vomiting; then light diet for the first day. · Advance to regular diet on second day, unless your doctor orders otherwise. · If nausea and vomiting continues, call your doctor. PAIN 
· Take pain medication as directed by your doctor. · Call your doctor if pain is NOT relieved by the medication. · DO NOT take aspirin or blood thinners until directed by your doctor. DRESSING CARE 
· Keep your dressing clean and dry. · Leave the dressing on until the dialysis nurse or your doctor takes it off. CARE OF YOUR ACCESS 
DO: 
· Keep access area clean with soap and water daily after dressing has been removed. · Feel for the thrill daily. (by placing your fingers over the graft you will be able to feel a vibration (thrill) which means blood is flowing and the graft is working.) DO NOT: 
· Wear tight sleeves, watches, belts or bracelets over graft. · Carry heavy bags across the graft. · Sleep on graft side. · Let your blood pressure be taken on graft side. · Let blood be drawn from your graft side. CALL YOUR DOCTOR IF 
· Excessive bleeding that does not stop after holding mild pressure over area. · Temperature of 101 degrees F or above. · Redness, excessive swelling or bruising, and/or green or yellow, smelly discharge from incision · Loss of sensation-cold, white, or blue fingers or toes. AFTER ANESTHESIA · For the first 24 hours: DO NOT Drive, Drink alcoholic beverages, or Make important decisions. · Be aware of dizziness following anesthesia and while taking pain medication. DISCHARGE SUMMARY from Nurse PATIENT INSTRUCTIONS: 
 
After general anesthesia or intravenous sedation, for 24 hours or while taking prescription Narcotics: · Limit your activities · Do not drive and operate hazardous machinery · Do not make important personal or business decisions · Do  not drink alcoholic beverages · If you have not urinated within 8 hours after discharge, please contact your surgeon on call. *  Please give a list of your current medications to your Primary Care Provider. *  Please update this list whenever your medications are discontinued, doses are 
    changed, or new medications (including over-the-counter products) are added. *  Please carry medication information at all times in case of emergency situations. These are general instructions for a healthy lifestyle: No smoking/ No tobacco products/ Avoid exposure to second hand smoke Surgeon General's Warning:  Quitting smoking now greatly reduces serious risk to your health. Obesity, smoking, and sedentary lifestyle greatly increases your risk for illness A healthy diet, regular physical exercise & weight monitoring are important for maintaining a healthy lifestyle You may be retaining fluid if you have a history of heart failure or if you experience any of the following symptoms:  Weight gain of 3 pounds or more overnight or 5 pounds in a week, increased swelling in our hands or feet or shortness of breath while lying flat in bed. Please call your doctor as soon as you notice any of these symptoms; do not wait until your next office visit. Recognize signs and symptoms of STROKE: 
 
F-face looks uneven A-arms unable to move or move unevenly S-speech slurred or non-existent T-time-call 911 as soon as signs and symptoms begin-DO NOT go Back to bed or wait to see if you get better-TIME IS BRAIN. Introducing [facility]! Dear Eloisa Rangel: Thank you for requesting a [employer] account.   Our records indicate that you have previously registered for a [employer] account but its currently inactive. Please call our [employer] support line at [phone]. Additional Information If you have questions, please visit the Frequently Asked Questions section of the [employer] website at [email or web address] Remember, MyChart is NOT to be used for urgent needs. For medical emergencies, dial 911. Now available from your iPhone and Android! Providers Seen During Your Hospitalization Provider Specialty Primary office phone Rob Barroso MD Vascular Surgery [phone] Your Primary Care Physician (PCP) Primary Care Physician Office Phone Office Fax [place], [address] You are allergic to the following Allergen Reactions Codeine Other (comments) Pt can not recall type of reaction -- only remembers being told allergic Oxycodone Unknown (comments) Recent Documentation Height Weight BMI OB Status Smoking Status 1.6 m 91.5 kg 35.74 kg/m2 Hysterectomy Never Smoker Emergency Contacts Name Discharge Info Relation Home Work Mobile Mario Piña  Spouse [3] [phone] Patient Belongings The following personal items are in your possession at time of discharge: 
  Dental Appliances: None             Jewelry: None  Clothing: Pants, Shirt    Other Valuables: None Please provide this summary of care documentation to your next provider. Signatures-by signing, you are acknowledging that this After Visit Summary has been reviewed with you and you have received a copy. Patient Signature:  ____________________________________________________________ Date:  ____________________________________________________________  
  
Lou Galarza Provider Signature:  ____________________________________________________________ Date:  ____________________________________________________________

## 2018-02-09 NOTE — ANESTHESIA POSTPROCEDURE EVALUATION
Post-Anesthesia Evaluation and Assessment    Patient: Chemo Ramos MRN: [id number]  SSN: [id number]    YOB: 1945  Age: 81 y.o. Sex: female       Cardiovascular Function/Vital Signs  Visit Vitals    BP (P) 132/62 (BP 1 Location: Right leg, BP Patient Position: At rest)    Pulse 71    Temp (P) 36.7 °C (98 °F)    Resp (P) 16    Ht 5' 3\" (1.6 m)    Wt 91.5 kg (201 lb 12 oz)    SpO2 (P) 100%    BMI 35.74 kg/m2       Patient is status post general anesthesia for Procedure(s):  RIGHT UPPER EXTREMITY ARTERIO VENOUS GRAFT  . Nausea/Vomiting: None    Postoperative hydration reviewed and adequate. Pain:  Pain Scale 1: (P) Numeric (0 - 10) (02/09/18 1415)  Pain Intensity 1: (P) 5 (02/09/18 1415)   Managed    Neurological Status:   Neuro (WDL): (P) Exceptions to WDL (02/09/18 1415)  Neuro  LUE Motor Response: Weak;Purposeful (02/09/18 1252)  LLE Motor Response: Purposeful;Weak (02/09/18 1252)  RUE Motor Response: Purposeful;Weak (02/09/18 1252)  RLE Motor Response: Purposeful;Weak (02/09/18 1252)   At baseline    Mental Status and Level of Consciousness: Arousable    Pulmonary Status:   O2 Device: (P) Nasal cannula (02/09/18 1415)   Adequate oxygenation and airway patent    Complications related to anesthesia: None    Post-anesthesia assessment completed.  No concerns    Signed By: Liaen Edward MD     February 9, 2018

## 2018-02-09 NOTE — ANESTHESIA PREPROCEDURE EVALUATION
Anesthetic History   No history of anesthetic complications            Review of Systems / Medical History  Patient summary reviewed and pertinent labs reviewed    Pulmonary        Sleep apnea: BiPAP        Comments: PE before Christmas - on coumadin - INR normal today   Neuro/Psych       CVA       Cardiovascular    Hypertension: well controlled      CHF: NYHA Classification II  Dysrhythmias : SVT  CAD    Exercise tolerance: <4 METS  Comments: Echo with EF 35-40% and PFO   GI/Hepatic/Renal     GERD: well controlled    Renal disease (HD; mWf - last dialyzed wednesday): ESRD and dialysis  Liver disease (hepatomegaly)     Endo/Other    Diabetes: well controlled, type 2, using insulin  Hypothyroidism  Arthritis and anemia     Other Findings   Comments: Gout  Fibromyalgia  Neuropathy  Hx sepsis  Vertigo since stroke  SLE         Physical Exam    Airway  Mallampati: II  TM Distance: 4 - 6 cm  Neck ROM: normal range of motion   Mouth opening: Normal     Cardiovascular  Regular rate and rhythm,  S1 and S2 normal,  no murmur, click, rub, or gallop  Rhythm: regular  Rate: normal         Dental  No notable dental hx       Pulmonary  Breath sounds clear to auscultation               Abdominal  GI exam deferred       Other Findings            Anesthetic Plan    ASA: 4  Anesthesia type: general          Induction: Intravenous  Anesthetic plan and risks discussed with: Patient and Spouse      Off Plavix x 1d so supraclavicular PNB not an option. Plan for GETA.

## 2018-02-09 NOTE — BRIEF OP NOTE
BRIEF OPERATIVE NOTE    Date of Procedure: 2/9/2018   Preoperative Diagnosis: End-stage renal disease ([facility] 75.) [N18.6]  Postoperative Diagnosis: End-stage renal disease ([facility] [N18.6]    Procedure(s):  RIGHT UPPER EXTREMITY ARTERIO VENOUS GRAFT    Surgeon(s) and Role:     * Marcella Dave MD - Primary         Assistant Staff: None      Surgical Staff:  Circ-1: Shena Lange RN  Circ-Relief: Delfina Castro RN  Scrub Tech-1: Michael Hung  Scrub Tech-2: Magdy Ackerman  Event Time In   Incision Start 1056   Incision Close 1225     Anesthesia: General   Estimated Blood Loss: 20cc    Specimens: * No specimens in log *   Findings:    Complications: None  Implants:   Implant Name Type Inv.  Item Serial No.  Lot No. LRB No. Used Action   GRAFT VASC N-S STD WL [id number] -- [employer] - [id number]   GRAFT VASC N-S STD WL [id number] -- [employer] [id number]  [employer]   Right 1 Implanted

## 2018-02-09 NOTE — DISCHARGE INSTRUCTIONS
INSTRUCTIONS FOR A-V FISTULA, GRAFT ACCESS, REVISION OR DECLOT    Blood Pressures may only be taken in pt's leg!! Restart Coumadin tomorrow 2/10    ACTIVITY  · As tolerated and as directed by your doctor. · Keep arm straight and raised above heart level for the next 24 hours. DIET  · Clear liquids until no nausea or vomiting; then light diet for the first day. · Advance to regular diet on second day, unless your doctor orders otherwise. · If nausea and vomiting continues, call your doctor. PAIN  · Take pain medication as directed by your doctor. · Call your doctor if pain is NOT relieved by the medication. · DO NOT take aspirin or blood thinners until directed by your doctor. DRESSING CARE  · Keep your dressing clean and dry. · Leave the dressing on until the dialysis nurse or your doctor takes it off. CARE OF YOUR ACCESS  DO:  · Keep access area clean with soap and water daily after dressing has been removed. · Feel for the thrill daily. (by placing your fingers over the graft you will be able to feel a vibration (thrill) which means blood is flowing and the graft is working.)  DO NOT:  · Wear tight sleeves, watches, belts or bracelets over graft. · Carry heavy bags across the graft. · Sleep on graft side. · Let your blood pressure be taken on graft side. · Let blood be drawn from your graft side. CALL YOUR DOCTOR IF  · Excessive bleeding that does not stop after holding mild pressure over area. · Temperature of 101 degrees F or above. · Redness, excessive swelling or bruising, and/or green or yellow, smelly discharge from incision   · Loss of sensation-cold, white, or blue fingers or toes. AFTER ANESTHESIA  · For the first 24 hours: DO NOT Drive, Drink alcoholic beverages, or Make important decisions. · Be aware of dizziness following anesthesia and while taking pain medication.      DISCHARGE SUMMARY from Nurse    PATIENT INSTRUCTIONS:    After general anesthesia or intravenous sedation, for 24 hours or while taking prescription Narcotics:  · Limit your activities  · Do not drive and operate hazardous machinery  · Do not make important personal or business decisions  · Do  not drink alcoholic beverages  · If you have not urinated within 8 hours after discharge, please contact your surgeon on call. *  Please give a list of your current medications to your Primary Care Provider. *  Please update this list whenever your medications are discontinued, doses are      changed, or new medications (including over-the-counter products) are added. *  Please carry medication information at all times in case of emergency situations. These are general instructions for a healthy lifestyle:    No smoking/ No tobacco products/ Avoid exposure to second hand smoke    Surgeon General's Warning:  Quitting smoking now greatly reduces serious risk to your health. Obesity, smoking, and sedentary lifestyle greatly increases your risk for illness    A healthy diet, regular physical exercise & weight monitoring are important for maintaining a healthy lifestyle    You may be retaining fluid if you have a history of heart failure or if you experience any of the following symptoms:  Weight gain of 3 pounds or more overnight or 5 pounds in a week, increased swelling in our hands or feet or shortness of breath while lying flat in bed. Please call your doctor as soon as you notice any of these symptoms; do not wait until your next office visit. Recognize signs and symptoms of STROKE:    F-face looks uneven    A-arms unable to move or move unevenly    S-speech slurred or non-existent    T-time-call 911 as soon as signs and symptoms begin-DO NOT go       Back to bed or wait to see if you get better-TIME IS BRAIN.

## 2018-02-09 NOTE — OP NOTES
[facility]  OPERATIVE REPORT    Boris Calvillo  MR#: [id number]  : 1945  ACCOUNT #: [de-identified]   DATE OF SERVICE: 2018    PREOPERATIVE DIAGNOSIS:  End-stage renal disease. POSTOPERATIVE DIAGNOSIS:  End-stage renal disease. PROCEDURE PERFORMED:  Right AV graft. SURGEON:  Ivan Caban MD     ASSISTANT:     ANESTHESIA:  General endotracheal.    ESTIMATED BLOOD LOSS:  10 mL. DRAINS:  None. SPECIMENS REMOVED:  None. COMPLICATIONS:  None. IMPLANTS:      DESCRIPTION OF PROCEDURE:  The patient was brought to operating room and placed on the operating table in supine position. Following adequate general endotracheal anesthesia, the right forearm was draped and prepped in a sterile fashion. A small transverse incision made just below the antecubital fossa. The wound was deepened using Bovie to control bleeding. The median cubital and basilic veins were identified, mobilized and encircled with vessel loops in Chin fashion. Next, the brachial artery was exposed to this level, mobilized with cervical vessel loop proximally and distally. Next, a small counterincision made distally on the forearm. A 6 mm PTFE graft was then tunneled in the subcutaneous plane in loop fashion. The patient was then heparinized. Next, the loops around the brachial artery were tightened to include flow. An end-to-side anastomosis was created with a running 6-0 Prolene suture. Following completion of anastomosis, a clamp was placed on the graft and the loops around the brachial artery were released to restore flow. Next, the loops from the vein were tightened to include flow. A longitudinal venotomy was performed. An end-to-side anastomosis was then created with a running 7-0 Prolene suture. Prior to completion of the second anastomosis, the graft was flushed and then the vessels were backbled. The anastomosis was completed and flow restored.   At this point, there was a good thrill in the graft. Protamine was administered to reverse the heparin effect. Hemostasis was achieved and the wounds were closed in layers with Vicryl suture. Sterile dry dressings were applied. The patient was awakened from anesthesia and transferred to recovery in stable condition. The patient tolerated well. No complications.       MD Marie Kirk  D: 02/09/2018 13:26     T: 02/09/2018 17:56  JOB #: [id number]

## 2018-02-19 ENCOUNTER — PATIENT OUTREACH (OUTPATIENT)
Dept: CASE MANAGEMENT | Age: 73
End: 2018-02-19

## 2018-02-19 NOTE — PROGRESS NOTES
This note will not be viewable in [address] RNCM attempted to reach pt regarding KISHORE to home from [facility], no answer and no name on vm. Will continue attempts to reach pt.

## 2018-02-20 ENCOUNTER — PATIENT OUTREACH (OUTPATIENT)
Dept: CASE MANAGEMENT | Age: 73
End: 2018-02-20

## 2018-02-20 NOTE — PROGRESS NOTES
This note will not be viewable in [address] RNCM spoke w/ pt and explained CCM services. Pt  states she is still in rehab, she is unaware of a dc date at this time. Will continue to follow for anticipate KISHORE to home.

## 2018-02-23 ENCOUNTER — PATIENT OUTREACH (OUTPATIENT)
Dept: CASE MANAGEMENT | Age: 73
End: 2018-02-23

## 2018-02-23 ENCOUNTER — HOME HEALTH ADMISSION (OUTPATIENT)
Dept: HOME HEALTH SERVICES | Facility: HOME HEALTH | Age: 73
End: 2018-02-23
Payer: MEDICARE

## 2018-02-24 ENCOUNTER — HOME CARE VISIT (OUTPATIENT)
Dept: SCHEDULING | Facility: HOME HEALTH | Age: 73
End: 2018-02-24
Payer: MEDICARE

## 2018-02-24 PROCEDURE — 3331090001 HH PPS REVENUE CREDIT

## 2018-02-24 PROCEDURE — 3331090002 HH PPS REVENUE DEBIT

## 2018-02-24 PROCEDURE — 400013 HH SOC

## 2018-02-24 PROCEDURE — G0299 HHS/HOSPICE OF RN EA 15 MIN: HCPCS

## 2018-02-25 VITALS
WEIGHT: 203 LBS | DIASTOLIC BLOOD PRESSURE: 76 MMHG | SYSTOLIC BLOOD PRESSURE: 138 MMHG | TEMPERATURE: 98.3 F | RESPIRATION RATE: 18 BRPM | HEART RATE: 78 BPM | OXYGEN SATURATION: 94 % | BODY MASS INDEX: 35.97 KG/M2 | HEIGHT: 63 IN

## 2018-02-25 LAB
INR, POC: 1.3 (ref 0.7–1.2)
PROTHROMBIN TIME, POC: 15.2 SECONDS (ref 6.5–11.9)

## 2018-02-25 PROCEDURE — 3331090002 HH PPS REVENUE DEBIT

## 2018-02-25 PROCEDURE — 3331090001 HH PPS REVENUE CREDIT

## 2018-02-26 ENCOUNTER — PATIENT OUTREACH (OUTPATIENT)
Dept: CASE MANAGEMENT | Age: 73
End: 2018-02-26

## 2018-02-26 ENCOUNTER — HOME CARE VISIT (OUTPATIENT)
Dept: HOME HEALTH SERVICES | Facility: HOME HEALTH | Age: 73
End: 2018-02-26
Payer: MEDICARE

## 2018-02-26 PROCEDURE — 3331090001 HH PPS REVENUE CREDIT

## 2018-02-26 PROCEDURE — 3331090002 HH PPS REVENUE DEBIT

## 2018-02-27 ENCOUNTER — HOME CARE VISIT (OUTPATIENT)
Dept: HOME HEALTH SERVICES | Facility: HOME HEALTH | Age: 73
End: 2018-02-27
Payer: MEDICARE

## 2018-02-27 PROBLEM — E66.9 RESTRICTIVE LUNG DISEASE SECONDARY TO OBESITY: Status: ACTIVE | Noted: 2018-02-27

## 2018-02-27 PROBLEM — J96.12 CHRONIC RESPIRATORY FAILURE WITH HYPOXIA AND HYPERCAPNIA (HCC): Status: ACTIVE | Noted: 2018-02-27

## 2018-02-27 PROBLEM — J96.11 CHRONIC RESPIRATORY FAILURE WITH HYPOXIA AND HYPERCAPNIA (HCC): Status: ACTIVE | Noted: 2018-02-27

## 2018-02-27 PROBLEM — J98.4 RESTRICTIVE LUNG DISEASE SECONDARY TO OBESITY: Status: ACTIVE | Noted: 2018-02-27

## 2018-02-27 PROCEDURE — 3331090002 HH PPS REVENUE DEBIT

## 2018-02-27 PROCEDURE — 3331090001 HH PPS REVENUE CREDIT

## 2018-02-28 PROCEDURE — 3331090001 HH PPS REVENUE CREDIT

## 2018-02-28 PROCEDURE — 3331090002 HH PPS REVENUE DEBIT

## 2018-03-01 ENCOUNTER — HOME CARE VISIT (OUTPATIENT)
Dept: SCHEDULING | Facility: HOME HEALTH | Age: 73
End: 2018-03-01
Payer: MEDICARE

## 2018-03-01 VITALS
DIASTOLIC BLOOD PRESSURE: 62 MMHG | TEMPERATURE: 96.8 F | SYSTOLIC BLOOD PRESSURE: 104 MMHG | RESPIRATION RATE: 20 BRPM | HEART RATE: 74 BPM | OXYGEN SATURATION: 90 %

## 2018-03-01 VITALS — OXYGEN SATURATION: 91 % | TEMPERATURE: 98.4 F | HEART RATE: 76 BPM | RESPIRATION RATE: 18 BRPM

## 2018-03-01 VITALS — SYSTOLIC BLOOD PRESSURE: 116 MMHG | HEART RATE: 73 BPM | DIASTOLIC BLOOD PRESSURE: 72 MMHG

## 2018-03-01 LAB
INR, POC: 1.3 (ref 0.7–1.2)
PROTHROMBIN TIME, POC: 15.5 SECONDS (ref 6.5–11.9)

## 2018-03-01 PROCEDURE — G0152 HHCP-SERV OF OT,EA 15 MIN: HCPCS

## 2018-03-01 PROCEDURE — 3331090002 HH PPS REVENUE DEBIT

## 2018-03-01 PROCEDURE — 3331090001 HH PPS REVENUE CREDIT

## 2018-03-01 PROCEDURE — G0299 HHS/HOSPICE OF RN EA 15 MIN: HCPCS

## 2018-03-01 PROCEDURE — G0151 HHCP-SERV OF PT,EA 15 MIN: HCPCS

## 2018-03-02 PROCEDURE — 3331090002 HH PPS REVENUE DEBIT

## 2018-03-02 PROCEDURE — 3331090001 HH PPS REVENUE CREDIT

## 2018-03-03 PROCEDURE — 3331090002 HH PPS REVENUE DEBIT

## 2018-03-03 PROCEDURE — 3331090001 HH PPS REVENUE CREDIT

## 2018-03-04 PROCEDURE — 3331090002 HH PPS REVENUE DEBIT

## 2018-03-04 PROCEDURE — 3331090001 HH PPS REVENUE CREDIT

## 2018-03-05 PROCEDURE — 3331090002 HH PPS REVENUE DEBIT

## 2018-03-05 PROCEDURE — 3331090001 HH PPS REVENUE CREDIT

## 2018-03-06 ENCOUNTER — HOME CARE VISIT (OUTPATIENT)
Dept: SCHEDULING | Facility: HOME HEALTH | Age: 73
End: 2018-03-06
Payer: MEDICARE

## 2018-03-06 ENCOUNTER — PATIENT OUTREACH (OUTPATIENT)
Dept: CASE MANAGEMENT | Age: 73
End: 2018-03-06

## 2018-03-06 VITALS
HEART RATE: 63 BPM | SYSTOLIC BLOOD PRESSURE: 128 MMHG | RESPIRATION RATE: 18 BRPM | TEMPERATURE: 96.3 F | DIASTOLIC BLOOD PRESSURE: 74 MMHG

## 2018-03-06 LAB
INR, POC: 2.4 (ref 0.7–1.2)
PROTHROMBIN TIME, POC: 28.8 SECONDS (ref 6.5–11.9)

## 2018-03-06 PROCEDURE — 3331090001 HH PPS REVENUE CREDIT

## 2018-03-06 PROCEDURE — G0299 HHS/HOSPICE OF RN EA 15 MIN: HCPCS

## 2018-03-06 PROCEDURE — G0151 HHCP-SERV OF PT,EA 15 MIN: HCPCS

## 2018-03-06 PROCEDURE — 3331090002 HH PPS REVENUE DEBIT

## 2018-03-06 NOTE — PROGRESS NOTES
This note will not be viewable in [address] 1RNCM spoke w/ pt regarding CCM services. Pt receives HD MWF, recent dc from SNF for rehab after PE. Pt has [facility] and is using rollator. She has O2 for use around the clock at 2ltrs if O2 sat monitor is </=92. RNCM discussed Coumadin schedule w/ pt who states she was unaware that she was to take Coumadin 10mg last night. She has continued taking 7.5mg daily. RNCM instructed her to notify Luis Bravo RN upon visit today for next INR draw. Pt agrees. RNCM discussed resp s/s to report to physician. Pt denies swelling/SOB. Pt scheduled to see PCP 3/8/18. RNCM scheduled f/u in 2wks.

## 2018-03-07 VITALS
TEMPERATURE: 96.4 F | OXYGEN SATURATION: 95 % | DIASTOLIC BLOOD PRESSURE: 58 MMHG | SYSTOLIC BLOOD PRESSURE: 104 MMHG | RESPIRATION RATE: 18 BRPM

## 2018-03-07 PROCEDURE — 3331090001 HH PPS REVENUE CREDIT

## 2018-03-07 PROCEDURE — 3331090002 HH PPS REVENUE DEBIT

## 2018-03-08 ENCOUNTER — HOME CARE VISIT (OUTPATIENT)
Dept: SCHEDULING | Facility: HOME HEALTH | Age: 73
End: 2018-03-08
Payer: MEDICARE

## 2018-03-08 VITALS
HEART RATE: 63 BPM | SYSTOLIC BLOOD PRESSURE: 134 MMHG | RESPIRATION RATE: 17 BRPM | DIASTOLIC BLOOD PRESSURE: 72 MMHG | OXYGEN SATURATION: 95 %

## 2018-03-08 PROCEDURE — 3331090001 HH PPS REVENUE CREDIT

## 2018-03-08 PROCEDURE — G0151 HHCP-SERV OF PT,EA 15 MIN: HCPCS

## 2018-03-08 PROCEDURE — 3331090002 HH PPS REVENUE DEBIT

## 2018-03-09 PROCEDURE — 3331090002 HH PPS REVENUE DEBIT

## 2018-03-09 PROCEDURE — 3331090001 HH PPS REVENUE CREDIT

## 2018-03-10 PROCEDURE — 3331090001 HH PPS REVENUE CREDIT

## 2018-03-10 PROCEDURE — 3331090002 HH PPS REVENUE DEBIT

## 2018-03-11 PROCEDURE — 3331090002 HH PPS REVENUE DEBIT

## 2018-03-11 PROCEDURE — 3331090001 HH PPS REVENUE CREDIT

## 2018-03-12 PROCEDURE — 3331090001 HH PPS REVENUE CREDIT

## 2018-03-12 PROCEDURE — 3331090002 HH PPS REVENUE DEBIT

## 2018-03-13 ENCOUNTER — HOSPITAL ENCOUNTER (OUTPATIENT)
Dept: LAB | Age: 73
Discharge: HOME | End: 2018-03-13
Payer: MEDICARE

## 2018-03-13 ENCOUNTER — HOME CARE VISIT (OUTPATIENT)
Dept: SCHEDULING | Facility: HOME HEALTH | Age: 73
End: 2018-03-13
Payer: MEDICARE

## 2018-03-13 VITALS
DIASTOLIC BLOOD PRESSURE: 64 MMHG | TEMPERATURE: 98.4 F | RESPIRATION RATE: 18 BRPM | HEART RATE: 68 BPM | SYSTOLIC BLOOD PRESSURE: 110 MMHG | OXYGEN SATURATION: 92 %

## 2018-03-13 VITALS
SYSTOLIC BLOOD PRESSURE: 100 MMHG | OXYGEN SATURATION: 94 % | RESPIRATION RATE: 18 BRPM | DIASTOLIC BLOOD PRESSURE: 62 MMHG | HEART RATE: 84 BPM | TEMPERATURE: 98.5 F

## 2018-03-13 LAB
FAX TO INFO,FAXT: NORMAL
FAX TO NUMBER,FAXN: NORMAL
INR PPP: 2.4
PROTHROMBIN TIME: 25.8 SEC (ref 11.5–14.5)

## 2018-03-13 PROCEDURE — G0157 HHC PT ASSISTANT EA 15: HCPCS

## 2018-03-13 PROCEDURE — 85610 PROTHROMBIN TIME: CPT | Performed by: INTERNAL MEDICINE

## 2018-03-13 PROCEDURE — G0299 HHS/HOSPICE OF RN EA 15 MIN: HCPCS

## 2018-03-13 PROCEDURE — 3331090001 HH PPS REVENUE CREDIT

## 2018-03-13 PROCEDURE — 3331090002 HH PPS REVENUE DEBIT

## 2018-03-14 PROCEDURE — 3331090001 HH PPS REVENUE CREDIT

## 2018-03-14 PROCEDURE — 3331090002 HH PPS REVENUE DEBIT

## 2018-03-14 PROCEDURE — A6212 FOAM DRG <=16 SQ IN W/BORDER: HCPCS

## 2018-03-15 ENCOUNTER — HOME CARE VISIT (OUTPATIENT)
Dept: SCHEDULING | Facility: HOME HEALTH | Age: 73
End: 2018-03-15
Payer: MEDICARE

## 2018-03-15 VITALS
HEART RATE: 82 BPM | DIASTOLIC BLOOD PRESSURE: 74 MMHG | SYSTOLIC BLOOD PRESSURE: 136 MMHG | OXYGEN SATURATION: 96 % | RESPIRATION RATE: 17 BRPM

## 2018-03-15 PROCEDURE — G0151 HHCP-SERV OF PT,EA 15 MIN: HCPCS

## 2018-03-15 PROCEDURE — 3331090002 HH PPS REVENUE DEBIT

## 2018-03-15 PROCEDURE — 3331090001 HH PPS REVENUE CREDIT

## 2018-03-16 PROCEDURE — 3331090001 HH PPS REVENUE CREDIT

## 2018-03-16 PROCEDURE — 3331090002 HH PPS REVENUE DEBIT

## 2018-03-17 PROCEDURE — 3331090001 HH PPS REVENUE CREDIT

## 2018-03-17 PROCEDURE — 3331090002 HH PPS REVENUE DEBIT

## 2018-03-18 PROCEDURE — 3331090001 HH PPS REVENUE CREDIT

## 2018-03-18 PROCEDURE — 3331090002 HH PPS REVENUE DEBIT

## 2018-03-19 PROCEDURE — 3331090001 HH PPS REVENUE CREDIT

## 2018-03-19 PROCEDURE — 3331090002 HH PPS REVENUE DEBIT

## 2018-03-20 ENCOUNTER — HOME CARE VISIT (OUTPATIENT)
Dept: HOME HEALTH SERVICES | Facility: HOME HEALTH | Age: 73
End: 2018-03-20
Payer: MEDICARE

## 2018-03-20 ENCOUNTER — HOME CARE VISIT (OUTPATIENT)
Dept: SCHEDULING | Facility: HOME HEALTH | Age: 73
End: 2018-03-20
Payer: MEDICARE

## 2018-03-20 VITALS
TEMPERATURE: 97 F | SYSTOLIC BLOOD PRESSURE: 102 MMHG | OXYGEN SATURATION: 96 % | HEART RATE: 70 BPM | RESPIRATION RATE: 18 BRPM | DIASTOLIC BLOOD PRESSURE: 64 MMHG

## 2018-03-20 LAB
INR, POC: 8 (ref 0.7–1.2)
PROTHROMBIN TIME, POC: 96.4 SECONDS (ref 6.5–11.9)

## 2018-03-20 PROCEDURE — 3331090001 HH PPS REVENUE CREDIT

## 2018-03-20 PROCEDURE — G0157 HHC PT ASSISTANT EA 15: HCPCS

## 2018-03-20 PROCEDURE — G0299 HHS/HOSPICE OF RN EA 15 MIN: HCPCS

## 2018-03-20 PROCEDURE — 3331090002 HH PPS REVENUE DEBIT

## 2018-03-21 VITALS
DIASTOLIC BLOOD PRESSURE: 54 MMHG | OXYGEN SATURATION: 97 % | HEART RATE: 78 BPM | TEMPERATURE: 98.6 F | RESPIRATION RATE: 18 BRPM | SYSTOLIC BLOOD PRESSURE: 114 MMHG

## 2018-03-21 PROCEDURE — 3331090001 HH PPS REVENUE CREDIT

## 2018-03-21 PROCEDURE — 3331090002 HH PPS REVENUE DEBIT

## 2018-03-22 ENCOUNTER — HOME CARE VISIT (OUTPATIENT)
Dept: SCHEDULING | Facility: HOME HEALTH | Age: 73
End: 2018-03-22
Payer: MEDICARE

## 2018-03-22 VITALS — HEART RATE: 73 BPM | DIASTOLIC BLOOD PRESSURE: 76 MMHG | OXYGEN SATURATION: 97 % | SYSTOLIC BLOOD PRESSURE: 134 MMHG

## 2018-03-22 PROCEDURE — G0151 HHCP-SERV OF PT,EA 15 MIN: HCPCS

## 2018-03-22 PROCEDURE — 3331090002 HH PPS REVENUE DEBIT

## 2018-03-22 PROCEDURE — 3331090001 HH PPS REVENUE CREDIT

## 2018-03-23 ENCOUNTER — HOME CARE VISIT (OUTPATIENT)
Dept: SCHEDULING | Facility: HOME HEALTH | Age: 73
End: 2018-03-23
Payer: MEDICARE

## 2018-03-23 PROCEDURE — G0299 HHS/HOSPICE OF RN EA 15 MIN: HCPCS

## 2018-03-23 PROCEDURE — 3331090001 HH PPS REVENUE CREDIT

## 2018-03-23 PROCEDURE — 3331090002 HH PPS REVENUE DEBIT

## 2018-03-24 PROCEDURE — 3331090001 HH PPS REVENUE CREDIT

## 2018-03-24 PROCEDURE — 3331090002 HH PPS REVENUE DEBIT

## 2018-03-25 VITALS
TEMPERATURE: 98.3 F | DIASTOLIC BLOOD PRESSURE: 72 MMHG | OXYGEN SATURATION: 99 % | SYSTOLIC BLOOD PRESSURE: 124 MMHG | RESPIRATION RATE: 16 BRPM | HEART RATE: 68 BPM

## 2018-03-25 LAB
INR BLD: 5.8 (ref 0.9–1.1)
PT POC: 0 SECONDS (ref 11.8–14.9)

## 2018-03-25 PROCEDURE — 3331090001 HH PPS REVENUE CREDIT

## 2018-03-25 PROCEDURE — 3331090002 HH PPS REVENUE DEBIT

## 2018-03-26 ENCOUNTER — HOME CARE VISIT (OUTPATIENT)
Dept: SCHEDULING | Facility: HOME HEALTH | Age: 73
End: 2018-03-26
Payer: MEDICARE

## 2018-03-26 VITALS
TEMPERATURE: 96.2 F | OXYGEN SATURATION: 98 % | RESPIRATION RATE: 18 BRPM | SYSTOLIC BLOOD PRESSURE: 138 MMHG | HEART RATE: 66 BPM | DIASTOLIC BLOOD PRESSURE: 86 MMHG

## 2018-03-26 LAB
INR BLD: 1.4 (ref 0.9–1.1)
PT POC: 16.8 SECONDS (ref 11.8–14.9)

## 2018-03-26 PROCEDURE — G0299 HHS/HOSPICE OF RN EA 15 MIN: HCPCS

## 2018-03-26 PROCEDURE — 3331090001 HH PPS REVENUE CREDIT

## 2018-03-26 PROCEDURE — 3331090002 HH PPS REVENUE DEBIT

## 2018-03-27 VITALS
RESPIRATION RATE: 18 BRPM | HEART RATE: 78 BPM | DIASTOLIC BLOOD PRESSURE: 66 MMHG | OXYGEN SATURATION: 96 % | TEMPERATURE: 98.6 F | SYSTOLIC BLOOD PRESSURE: 108 MMHG

## 2018-03-27 LAB
INR, POC: 8 (ref 0.7–1.2)
PROTHROMBIN TIME, POC: 96 SECONDS (ref 6.5–11.9)

## 2018-03-27 PROCEDURE — 3331090002 HH PPS REVENUE DEBIT

## 2018-03-27 PROCEDURE — 3331090001 HH PPS REVENUE CREDIT

## 2018-03-28 PROCEDURE — 3331090002 HH PPS REVENUE DEBIT

## 2018-03-28 PROCEDURE — 3331090001 HH PPS REVENUE CREDIT

## 2018-03-29 ENCOUNTER — HOME CARE VISIT (OUTPATIENT)
Dept: SCHEDULING | Facility: HOME HEALTH | Age: 73
End: 2018-03-29
Payer: MEDICARE

## 2018-03-29 PROCEDURE — 3331090001 HH PPS REVENUE CREDIT

## 2018-03-29 PROCEDURE — 3331090002 HH PPS REVENUE DEBIT

## 2018-03-30 PROCEDURE — 3331090002 HH PPS REVENUE DEBIT

## 2018-03-30 PROCEDURE — 3331090001 HH PPS REVENUE CREDIT

## 2018-03-31 PROCEDURE — 3331090002 HH PPS REVENUE DEBIT

## 2018-03-31 PROCEDURE — 3331090001 HH PPS REVENUE CREDIT

## 2018-04-01 PROCEDURE — 3331090001 HH PPS REVENUE CREDIT

## 2018-04-01 PROCEDURE — 3331090002 HH PPS REVENUE DEBIT

## 2018-04-02 ENCOUNTER — HOME CARE VISIT (OUTPATIENT)
Dept: SCHEDULING | Facility: HOME HEALTH | Age: 73
End: 2018-04-02
Payer: MEDICARE

## 2018-04-02 VITALS
DIASTOLIC BLOOD PRESSURE: 88 MMHG | OXYGEN SATURATION: 98 % | HEART RATE: 68 BPM | RESPIRATION RATE: 18 BRPM | TEMPERATURE: 97.4 F | SYSTOLIC BLOOD PRESSURE: 148 MMHG

## 2018-04-02 LAB
INR, POC: 1.7 (ref 0.7–1.2)
PROTHROMBIN TIME, POC: 20.4 SECONDS (ref 6.5–11.9)

## 2018-04-02 PROCEDURE — 3331090001 HH PPS REVENUE CREDIT

## 2018-04-02 PROCEDURE — 3331090002 HH PPS REVENUE DEBIT

## 2018-04-02 PROCEDURE — G0299 HHS/HOSPICE OF RN EA 15 MIN: HCPCS

## 2018-04-03 ENCOUNTER — HOSPITAL ENCOUNTER (OUTPATIENT)
Dept: SURGERY | Age: 73
Discharge: HOME | End: 2018-04-03
Payer: MEDICARE

## 2018-04-03 VITALS
HEART RATE: 68 BPM | SYSTOLIC BLOOD PRESSURE: 150 MMHG | WEIGHT: 203.31 LBS | HEIGHT: 63 IN | OXYGEN SATURATION: 93 % | BODY MASS INDEX: 36.02 KG/M2 | RESPIRATION RATE: 16 BRPM | DIASTOLIC BLOOD PRESSURE: 52 MMHG | TEMPERATURE: 97.7 F

## 2018-04-03 LAB — GLUCOSE BLD STRIP.AUTO-MCNC: 135 MG/DL (ref 65–100)

## 2018-04-03 PROCEDURE — 3331090002 HH PPS REVENUE DEBIT

## 2018-04-03 PROCEDURE — 3331090001 HH PPS REVENUE CREDIT

## 2018-04-03 PROCEDURE — 82962 GLUCOSE BLOOD TEST: CPT

## 2018-04-03 NOTE — PERIOP NOTES
Patient verified name, , and surgery as listed in [facility]. Patient provided medical/health information and PTA medications to the best of their ability. TYPE  CASE:lB  Orders per surgeon: Received and dated 2018  Labs per surgeon:ramos. Request results to be faxed from [address] renal.   Labs per anesthesia protocol: POC glucose 135 . Instructed  Patient that if blood sugar 300 or > , surgery may be cancelled. Arian Daily EKG  :  EKG from 2017, 2017, ECHO and last cardiac note from 2017 all placed with chart and Dr Lopez Call in to review and no new orders received    Patient provided with and instructed on education handouts including Guide to Surgery, blood transfusions, pain management, and hand hygiene for the family and community, and [facility] brochure. Hibiclens,antibacterial soap and instructions given per hospital policy. Instructed patient to continue previous medications as prescribed prior to surgery unless otherwise directed and to take the following medications the day of surgery according to anesthesia guidelines : tylenol or tramadol if needed, allopurinol, plavix,warfarin, gabapentin, mucinex, levothyroxine, midodrine, omeprazole    Instructed to take Lantus 4 units the night before surgery     . Instructed patient to hold  the following medications: all vitamins and supplements 7 days prior to surgery  and all nsaids 5 days prior to surgery including motrin,advil,aleve,ibuprofen    Original medication prescription bottles were not visualized during patient appointment. Patient teach back successful and patient demonstrates knowledge of instruction.

## 2018-04-04 PROCEDURE — 3331090002 HH PPS REVENUE DEBIT

## 2018-04-04 PROCEDURE — 3331090001 HH PPS REVENUE CREDIT

## 2018-04-05 ENCOUNTER — PATIENT OUTREACH (OUTPATIENT)
Dept: CASE MANAGEMENT | Age: 73
End: 2018-04-05

## 2018-04-05 PROCEDURE — 3331090002 HH PPS REVENUE DEBIT

## 2018-04-05 PROCEDURE — 3331090001 HH PPS REVENUE CREDIT

## 2018-04-05 NOTE — PERIOP NOTES
Spoke to Vazquez Escobar at [address] Renal in [facility]. States that labs will be resulted tomorrow and will fax then.

## 2018-04-06 PROCEDURE — 3331090001 HH PPS REVENUE CREDIT

## 2018-04-06 PROCEDURE — 3331090002 HH PPS REVENUE DEBIT

## 2018-04-06 NOTE — PERIOP NOTES
Received faxed copy of labs from [address] [place], [place] [address] Renal in [facility] dated 4/4/18 that includes BMP. Placed on chart.

## 2018-04-06 NOTE — PROGRESS NOTES
This note will not be viewable in [address] RNCM spoke w/ pt regarding CCM services. Pt receives HD MWF, recent dc from SNF for rehab after PE. Pt has [facility] and is using rollator. She has O2 for use around the clock at 2ltrs if O2 sat monitor is </=92. Pt c/o knee pain but states she is not a candidate for surgery. She will see orthopedist again today and hoping to receive another injection, as the last one to the knee helped with pain for a while. Pt is not feeling as well today she reports d/t experiencing vertigo. She denies falls and reports being very careful w/ ambulation and transitions. Pt continues to be followed by [facility] RN for INR checks wkly. RNCM discussed Coumadin schedule, pt is able to verbalize, and reports  assists her with medications. RNCM scheduled f/u in 2wks.

## 2018-04-07 PROCEDURE — 3331090002 HH PPS REVENUE DEBIT

## 2018-04-07 PROCEDURE — 3331090001 HH PPS REVENUE CREDIT

## 2018-04-08 PROCEDURE — 3331090002 HH PPS REVENUE DEBIT

## 2018-04-08 PROCEDURE — 3331090001 HH PPS REVENUE CREDIT

## 2018-04-09 PROCEDURE — 3331090001 HH PPS REVENUE CREDIT

## 2018-04-09 PROCEDURE — 3331090002 HH PPS REVENUE DEBIT

## 2018-04-10 PROCEDURE — 3331090001 HH PPS REVENUE CREDIT

## 2018-04-10 PROCEDURE — 3331090002 HH PPS REVENUE DEBIT

## 2018-04-11 PROCEDURE — 3331090001 HH PPS REVENUE CREDIT

## 2018-04-11 PROCEDURE — 3331090002 HH PPS REVENUE DEBIT

## 2018-04-12 ENCOUNTER — HOME CARE VISIT (OUTPATIENT)
Dept: SCHEDULING | Facility: HOME HEALTH | Age: 73
End: 2018-04-12
Payer: MEDICARE

## 2018-04-12 PROCEDURE — G0299 HHS/HOSPICE OF RN EA 15 MIN: HCPCS

## 2018-04-12 PROCEDURE — 3331090001 HH PPS REVENUE CREDIT

## 2018-04-12 PROCEDURE — 3331090002 HH PPS REVENUE DEBIT

## 2018-04-13 ENCOUNTER — HOME CARE VISIT (OUTPATIENT)
Dept: SCHEDULING | Facility: HOME HEALTH | Age: 73
End: 2018-04-13
Payer: MEDICARE

## 2018-04-13 VITALS — HEART RATE: 70 BPM | DIASTOLIC BLOOD PRESSURE: 68 MMHG | RESPIRATION RATE: 16 BRPM | SYSTOLIC BLOOD PRESSURE: 120 MMHG

## 2018-04-13 PROCEDURE — 3331090001 HH PPS REVENUE CREDIT

## 2018-04-13 PROCEDURE — 3331090002 HH PPS REVENUE DEBIT

## 2018-04-13 PROCEDURE — 3331090003 HH PPS REVENUE ADJ

## 2018-04-14 PROCEDURE — 3331090002 HH PPS REVENUE DEBIT

## 2018-04-14 PROCEDURE — 3331090001 HH PPS REVENUE CREDIT

## 2018-04-15 PROCEDURE — 3331090001 HH PPS REVENUE CREDIT

## 2018-04-15 PROCEDURE — 3331090002 HH PPS REVENUE DEBIT

## 2018-04-16 PROCEDURE — 3331090001 HH PPS REVENUE CREDIT

## 2018-04-16 PROCEDURE — 3331090002 HH PPS REVENUE DEBIT

## 2018-04-17 PROCEDURE — 3331090002 HH PPS REVENUE DEBIT

## 2018-04-17 PROCEDURE — 3331090001 HH PPS REVENUE CREDIT

## 2018-04-18 PROCEDURE — 3331090001 HH PPS REVENUE CREDIT

## 2018-04-18 PROCEDURE — 3331090002 HH PPS REVENUE DEBIT

## 2018-04-19 PROCEDURE — 3331090002 HH PPS REVENUE DEBIT

## 2018-04-19 PROCEDURE — 3331090001 HH PPS REVENUE CREDIT

## 2018-04-20 PROCEDURE — 3331090002 HH PPS REVENUE DEBIT

## 2018-04-20 PROCEDURE — 3331090001 HH PPS REVENUE CREDIT

## 2018-04-21 PROCEDURE — 3331090002 HH PPS REVENUE DEBIT

## 2018-04-21 PROCEDURE — 3331090001 HH PPS REVENUE CREDIT

## 2018-04-22 PROCEDURE — 3331090002 HH PPS REVENUE DEBIT

## 2018-04-22 PROCEDURE — 3331090001 HH PPS REVENUE CREDIT

## 2018-04-23 PROCEDURE — 3331090002 HH PPS REVENUE DEBIT

## 2018-04-23 PROCEDURE — 3331090001 HH PPS REVENUE CREDIT

## 2018-04-24 PROCEDURE — 3331090001 HH PPS REVENUE CREDIT

## 2018-04-24 PROCEDURE — 3331090003 HH PPS REVENUE ADJ

## 2018-04-24 PROCEDURE — 3331090002 HH PPS REVENUE DEBIT

## 2018-04-30 ENCOUNTER — ANESTHESIA EVENT (OUTPATIENT)
Dept: SURGERY | Age: 73
End: 2018-04-30
Payer: MEDICARE

## 2018-05-01 ENCOUNTER — HOSPITAL ENCOUNTER (OUTPATIENT)
Age: 73
Setting detail: OUTPATIENT SURGERY
Discharge: HOME | End: 2018-05-01
Attending: SURGERY | Admitting: SURGERY
Payer: MEDICARE

## 2018-05-01 ENCOUNTER — ANESTHESIA (OUTPATIENT)
Dept: SURGERY | Age: 73
End: 2018-05-01
Payer: MEDICARE

## 2018-05-01 VITALS
HEART RATE: 68 BPM | OXYGEN SATURATION: 96 % | BODY MASS INDEX: 35.97 KG/M2 | WEIGHT: 203 LBS | DIASTOLIC BLOOD PRESSURE: 54 MMHG | SYSTOLIC BLOOD PRESSURE: 116 MMHG | HEIGHT: 63 IN | RESPIRATION RATE: 16 BRPM | TEMPERATURE: 98 F

## 2018-05-01 LAB
GLUCOSE BLD STRIP.AUTO-MCNC: 105 MG/DL (ref 65–100)
GLUCOSE BLD STRIP.AUTO-MCNC: 110 MG/DL (ref 65–100)
GLUCOSE BLD STRIP.AUTO-MCNC: 97 MG/DL (ref 65–100)
INR PPP: 2.7
POTASSIUM BLD-SCNC: 3.8 MMOL/L (ref 3.5–5.1)
PROTHROMBIN TIME: 27.9 SEC (ref 11.5–14.5)

## 2018-05-01 PROCEDURE — 77030008703 HC TU ET UNCUF COVD -A: Performed by: ANESTHESIOLOGY

## 2018-05-01 PROCEDURE — 74011250637 HC RX REV CODE- 250/637: Performed by: ANESTHESIOLOGY

## 2018-05-01 PROCEDURE — 77030002996 HC SUT SLK J&J -A: Performed by: SURGERY

## 2018-05-01 PROCEDURE — 76060000033 HC ANESTHESIA 1 TO 1.5 HR: Performed by: SURGERY

## 2018-05-01 PROCEDURE — 77030020782 HC GWN BAIR PAWS FLX 3M -B: Performed by: ANESTHESIOLOGY

## 2018-05-01 PROCEDURE — 74011250636 HC RX REV CODE- 250/636

## 2018-05-01 PROCEDURE — 74011250636 HC RX REV CODE- 250/636: Performed by: SURGERY

## 2018-05-01 PROCEDURE — 77030008477 HC STYL SATN SLP COVD -A: Performed by: ANESTHESIOLOGY

## 2018-05-01 PROCEDURE — 74011000250 HC RX REV CODE- 250

## 2018-05-01 PROCEDURE — 74011250636 HC RX REV CODE- 250/636: Performed by: ANESTHESIOLOGY

## 2018-05-01 PROCEDURE — 77030031139 HC SUT VCRL2 J&J -A: Performed by: SURGERY

## 2018-05-01 PROCEDURE — 84132 ASSAY OF SERUM POTASSIUM: CPT

## 2018-05-01 PROCEDURE — 77030002916 HC SUT ETHLN J&J -A: Performed by: SURGERY

## 2018-05-01 PROCEDURE — 77030019908 HC STETH ESOPH SIMS -A: Performed by: ANESTHESIOLOGY

## 2018-05-01 PROCEDURE — 82962 GLUCOSE BLOOD TEST: CPT

## 2018-05-01 PROCEDURE — 77030010512 HC APPL CLP LIG J&J -C: Performed by: SURGERY

## 2018-05-01 PROCEDURE — 76010000149 HC OR TIME 1 TO 1.5 HR: Performed by: SURGERY

## 2018-05-01 PROCEDURE — 85610 PROTHROMBIN TIME: CPT | Performed by: ANESTHESIOLOGY

## 2018-05-01 PROCEDURE — 76210000020 HC REC RM PH II FIRST 0.5 HR: Performed by: SURGERY

## 2018-05-01 PROCEDURE — 77030011640 HC PAD GRND REM COVD -A: Performed by: SURGERY

## 2018-05-01 PROCEDURE — 76210000006 HC OR PH I REC 0.5 TO 1 HR: Performed by: SURGERY

## 2018-05-01 RX ORDER — ESMOLOL HYDROCHLORIDE 10 MG/ML
INJECTION INTRAVENOUS AS NEEDED
Status: DISCONTINUED | OUTPATIENT
Start: 2018-05-01 | End: 2018-05-01 | Stop reason: HOSPADM

## 2018-05-01 RX ORDER — ONDANSETRON 2 MG/ML
INJECTION INTRAMUSCULAR; INTRAVENOUS AS NEEDED
Status: DISCONTINUED | OUTPATIENT
Start: 2018-05-01 | End: 2018-05-01 | Stop reason: HOSPADM

## 2018-05-01 RX ORDER — EPHEDRINE SULFATE 50 MG/ML
INJECTION, SOLUTION INTRAVENOUS AS NEEDED
Status: DISCONTINUED | OUTPATIENT
Start: 2018-05-01 | End: 2018-05-01 | Stop reason: HOSPADM

## 2018-05-01 RX ORDER — LIDOCAINE HYDROCHLORIDE 10 MG/ML
0.1 INJECTION INFILTRATION; PERINEURAL AS NEEDED
Status: DISCONTINUED | OUTPATIENT
Start: 2018-05-01 | End: 2018-05-01 | Stop reason: HOSPADM

## 2018-05-01 RX ORDER — HYDROMORPHONE HYDROCHLORIDE 2 MG/ML
0.5 INJECTION, SOLUTION INTRAMUSCULAR; INTRAVENOUS; SUBCUTANEOUS
Status: DISCONTINUED | OUTPATIENT
Start: 2018-05-01 | End: 2018-05-01 | Stop reason: HOSPADM

## 2018-05-01 RX ORDER — NEOSTIGMINE METHYLSULFATE 1 MG/ML
INJECTION INTRAVENOUS AS NEEDED
Status: DISCONTINUED | OUTPATIENT
Start: 2018-05-01 | End: 2018-05-01 | Stop reason: HOSPADM

## 2018-05-01 RX ORDER — MIDAZOLAM HYDROCHLORIDE 1 MG/ML
2 INJECTION, SOLUTION INTRAMUSCULAR; INTRAVENOUS
Status: DISCONTINUED | OUTPATIENT
Start: 2018-05-01 | End: 2018-05-01 | Stop reason: HOSPADM

## 2018-05-01 RX ORDER — SODIUM CHLORIDE, SODIUM LACTATE, POTASSIUM CHLORIDE, CALCIUM CHLORIDE 600; 310; 30; 20 MG/100ML; MG/100ML; MG/100ML; MG/100ML
100 INJECTION, SOLUTION INTRAVENOUS CONTINUOUS
Status: DISCONTINUED | OUTPATIENT
Start: 2018-05-01 | End: 2018-05-01 | Stop reason: HOSPADM

## 2018-05-01 RX ORDER — FENTANYL CITRATE 50 UG/ML
100 INJECTION, SOLUTION INTRAMUSCULAR; INTRAVENOUS ONCE
Status: DISCONTINUED | OUTPATIENT
Start: 2018-05-01 | End: 2018-05-01 | Stop reason: HOSPADM

## 2018-05-01 RX ORDER — TRAMADOL HYDROCHLORIDE 50 MG/1
50 TABLET ORAL
Status: DISCONTINUED | OUTPATIENT
Start: 2018-05-01 | End: 2018-05-01 | Stop reason: HOSPADM

## 2018-05-01 RX ORDER — MIDAZOLAM HYDROCHLORIDE 1 MG/ML
2 INJECTION, SOLUTION INTRAMUSCULAR; INTRAVENOUS ONCE
Status: DISCONTINUED | OUTPATIENT
Start: 2018-05-01 | End: 2018-05-01 | Stop reason: HOSPADM

## 2018-05-01 RX ORDER — LIDOCAINE HYDROCHLORIDE 20 MG/ML
INJECTION, SOLUTION EPIDURAL; INFILTRATION; INTRACAUDAL; PERINEURAL AS NEEDED
Status: DISCONTINUED | OUTPATIENT
Start: 2018-05-01 | End: 2018-05-01 | Stop reason: HOSPADM

## 2018-05-01 RX ORDER — PROPOFOL 10 MG/ML
INJECTION, EMULSION INTRAVENOUS AS NEEDED
Status: DISCONTINUED | OUTPATIENT
Start: 2018-05-01 | End: 2018-05-01 | Stop reason: HOSPADM

## 2018-05-01 RX ORDER — ROCURONIUM BROMIDE 10 MG/ML
INJECTION, SOLUTION INTRAVENOUS AS NEEDED
Status: DISCONTINUED | OUTPATIENT
Start: 2018-05-01 | End: 2018-05-01 | Stop reason: HOSPADM

## 2018-05-01 RX ORDER — HYDRALAZINE HYDROCHLORIDE 20 MG/ML
10 INJECTION INTRAMUSCULAR; INTRAVENOUS ONCE
Status: DISCONTINUED | OUTPATIENT
Start: 2018-05-01 | End: 2018-05-01

## 2018-05-01 RX ORDER — CEFAZOLIN SODIUM/WATER 2 G/20 ML
2 SYRINGE (ML) INTRAVENOUS ONCE
Status: COMPLETED | OUTPATIENT
Start: 2018-05-01 | End: 2018-05-01

## 2018-05-01 RX ORDER — NALOXONE HYDROCHLORIDE 0.4 MG/ML
0.1 INJECTION, SOLUTION INTRAMUSCULAR; INTRAVENOUS; SUBCUTANEOUS AS NEEDED
Status: DISCONTINUED | OUTPATIENT
Start: 2018-05-01 | End: 2018-05-01 | Stop reason: HOSPADM

## 2018-05-01 RX ORDER — ALBUTEROL SULFATE 0.83 MG/ML
2.5 SOLUTION RESPIRATORY (INHALATION) AS NEEDED
Status: DISCONTINUED | OUTPATIENT
Start: 2018-05-01 | End: 2018-05-01 | Stop reason: HOSPADM

## 2018-05-01 RX ORDER — ONDANSETRON 2 MG/ML
4 INJECTION INTRAMUSCULAR; INTRAVENOUS ONCE
Status: DISCONTINUED | OUTPATIENT
Start: 2018-05-01 | End: 2018-05-01 | Stop reason: HOSPADM

## 2018-05-01 RX ORDER — GLYCOPYRROLATE 0.2 MG/ML
INJECTION INTRAMUSCULAR; INTRAVENOUS AS NEEDED
Status: DISCONTINUED | OUTPATIENT
Start: 2018-05-01 | End: 2018-05-01 | Stop reason: HOSPADM

## 2018-05-01 RX ORDER — FENTANYL CITRATE 50 UG/ML
INJECTION, SOLUTION INTRAMUSCULAR; INTRAVENOUS AS NEEDED
Status: DISCONTINUED | OUTPATIENT
Start: 2018-05-01 | End: 2018-05-01 | Stop reason: HOSPADM

## 2018-05-01 RX ORDER — DIPHENHYDRAMINE HYDROCHLORIDE 50 MG/ML
12.5 INJECTION, SOLUTION INTRAMUSCULAR; INTRAVENOUS
Status: DISCONTINUED | OUTPATIENT
Start: 2018-05-01 | End: 2018-05-01 | Stop reason: HOSPADM

## 2018-05-01 RX ORDER — SODIUM CHLORIDE 9 MG/ML
50 INJECTION, SOLUTION INTRAVENOUS CONTINUOUS
Status: DISCONTINUED | OUTPATIENT
Start: 2018-05-01 | End: 2018-05-01 | Stop reason: HOSPADM

## 2018-05-01 RX ADMIN — ONDANSETRON 4 MG: 2 INJECTION INTRAMUSCULAR; INTRAVENOUS at 15:12

## 2018-05-01 RX ADMIN — TRAMADOL HYDROCHLORIDE 50 MG: 50 TABLET, FILM COATED ORAL at 16:34

## 2018-05-01 RX ADMIN — SODIUM CHLORIDE 50 ML/HR: 900 INJECTION, SOLUTION INTRAVENOUS at 11:50

## 2018-05-01 RX ADMIN — PROPOFOL 150 MG: 10 INJECTION, EMULSION INTRAVENOUS at 14:48

## 2018-05-01 RX ADMIN — Medication 2 G: at 14:49

## 2018-05-01 RX ADMIN — FENTANYL CITRATE 100 MCG: 50 INJECTION, SOLUTION INTRAMUSCULAR; INTRAVENOUS at 14:48

## 2018-05-01 RX ADMIN — ROCURONIUM BROMIDE 35 MG: 10 INJECTION, SOLUTION INTRAVENOUS at 14:48

## 2018-05-01 RX ADMIN — ESMOLOL HYDROCHLORIDE 20 MG: 10 INJECTION INTRAVENOUS at 15:30

## 2018-05-01 RX ADMIN — LIDOCAINE HYDROCHLORIDE 100 MG: 20 INJECTION, SOLUTION EPIDURAL; INFILTRATION; INTRACAUDAL; PERINEURAL at 14:48

## 2018-05-01 RX ADMIN — EPHEDRINE SULFATE 5 MG: 50 INJECTION, SOLUTION INTRAVENOUS at 15:05

## 2018-05-01 RX ADMIN — NEOSTIGMINE METHYLSULFATE 3 MG: 1 INJECTION INTRAVENOUS at 15:12

## 2018-05-01 RX ADMIN — SODIUM CHLORIDE, SODIUM LACTATE, POTASSIUM CHLORIDE, AND CALCIUM CHLORIDE: 600; 310; 30; 20 INJECTION, SOLUTION INTRAVENOUS at 14:34

## 2018-05-01 RX ADMIN — GLYCOPYRROLATE 0.4 MG: 0.2 INJECTION INTRAMUSCULAR; INTRAVENOUS at 15:12

## 2018-05-01 NOTE — BRIEF OP NOTE
BRIEF OPERATIVE NOTE    Date of Procedure: 5/1/2018   Preoperative Diagnosis: End stage renal disease ([facility] [address] [N18.6]  Postoperative Diagnosis: * No post-op diagnosis entered *    Procedure(s):  LIGATION OF LEFT ARTERIO VENOUS GRAFT   Surgeon(s) and Role:     * Russ Barton MD - Primary         Surgical Assistant:     Surgical Staff:  Circ-1: Socrates Good RN  Scrub Tech-1: Mitzie Kayser Cash  Scrub Tech-2: Sara Tejada; Malik Sommer;  Sophia Hummel CNA  Event Time In   Incision Start 1500   Incision Close 1512     Anesthesia: General   Estimated Blood Loss: 10cc  Specimens: * No specimens in log *   Findings:    Complications: None  Implants: * No implants in log *

## 2018-05-01 NOTE — ANESTHESIA PREPROCEDURE EVALUATION
Anesthetic History   No history of anesthetic complications            Review of Systems / Medical History  Patient summary reviewed and pertinent labs reviewed    Pulmonary        Sleep apnea: BiPAP        Comments: PE before Christmas - on coumadin - INR normal today   Neuro/Psych       CVA       Cardiovascular    Hypertension: well controlled      CHF: NYHA Classification II  Dysrhythmias : SVT  CAD    Exercise tolerance: <4 METS  Comments: Echo with EF 35-40% and PFO   GI/Hepatic/Renal     GERD: well controlled    Renal disease (HD; mWf - last dialyzed wednesday): ESRD and dialysis  Liver disease (hepatomegaly)     Endo/Other    Diabetes: well controlled, type 2, using insulin  Hypothyroidism  Arthritis and anemia     Other Findings   Comments: Gout  Fibromyalgia  Neuropathy  Hx sepsis  Vertigo since stroke  SLE           Physical Exam    Airway  Mallampati: II  TM Distance: 4 - 6 cm  Neck ROM: normal range of motion   Mouth opening: Normal     Cardiovascular  Regular rate and rhythm,  S1 and S2 normal,  no murmur, click, rub, or gallop  Rhythm: regular  Rate: normal         Dental  No notable dental hx       Pulmonary  Breath sounds clear to auscultation               Abdominal  GI exam deferred       Other Findings            Anesthetic Plan    ASA: 4  Anesthesia type: general          Induction: Intravenous  Anesthetic plan and risks discussed with: Patient and Spouse       Plan for GETA.

## 2018-05-01 NOTE — PROGRESS NOTES
made pre-op prayer visit. Pt was alert and verbal.  No pain level expressed or observed. Pt's spouse was present.  provided spiritual care through presence, pastoral conversation, prayer, and assurance of prayer.

## 2018-05-01 NOTE — DISCHARGE INSTRUCTIONS
ACTIVITY  · As tolerated and as directed by your doctor. · Keep arm straight and raised above heart level for the next 24 hours. DIET  · Clear liquids until no nausea or vomiting; then light diet for the first day. · Advance to regular diet on second day, unless your doctor orders otherwise. · If nausea and vomiting continues, call your doctor. PAIN  · Take pain medication as directed by your doctor. · Call your doctor if pain is NOT relieved by the medication. · DO NOT take aspirin or blood thinners until directed by your doctor. DRESSING CARE  · Keep your dressing clean and dry. · Leave the dressing on until the dialysis nurse or your doctor takes it off. CARE OF YOUR ACCESS  DO:  · Keep access area clean with soap and water daily after dressing has been removed. · Feel for the thrill daily. (by placing your fingers over the graft you will be able to feel a vibration (thrill) which means blood is flowing and the graft is working.)  DO NOT:  · Wear tight sleeves, watches, belts or bracelets over graft. · Carry heavy bags across the graft. · Sleep on graft side. · Let your blood pressure be taken on graft side. · Let blood be drawn from your graft side. CALL YOUR DOCTOR IF  · Excessive bleeding that does not stop after holding mild pressure over area. · Temperature of 101 degrees F or above. · Redness, excessive swelling or bruising, and/or green or yellow, smelly discharge from incision   · Loss of sensation-cold, white, or blue fingers or toes. After general anesthesia or intravenous sedation, for 24 hours or while taking prescription Narcotics:  · Limit your activities  · Do not drive and operate hazardous machinery  · Do not make important personal or business decisions  · Do  not drink alcoholic beverages  · If you have not urinated within 8 hours after discharge, please contact your surgeon on call.     *  Please give a list of your current medications to your Primary Care Provider. *  Please update this list whenever your medications are discontinued, doses are      changed, or new medications (including over-the-counter products) are added. *  Please carry medication information at all times in case of emergency situations. These are general instructions for a healthy lifestyle:  No smoking/ No tobacco products/ Avoid exposure to second hand smoke  Surgeon General's Warning:  Quitting smoking now greatly reduces serious risk to your health. Obesity, smoking, and sedentary lifestyle greatly increases your risk for illness  A healthy diet, regular physical exercise & weight monitoring are important for maintaining a healthy lifestyle    Recognize signs and symptoms of STROKE:    F-face looks uneven  A-arms unable to move or move unevenly  S-speech slurred or non-existent  T-time-call 911 as soon as signs and symptoms begin-DO NOT go       Back to bed or wait to see if you get better-TIME IS BRAIN.

## 2018-05-01 NOTE — ANESTHESIA POSTPROCEDURE EVALUATION
Post-Anesthesia Evaluation and Assessment    Patient: Ananth Jones MRN: [id number]  SSN: [id number]    YOB: 1945  Age: 72 y.o. Sex: female       Cardiovascular Function/Vital Signs  Visit Vitals    /70 (BP 1 Location: Left leg, BP Patient Position: At rest)    Pulse 67    Temp 36.7 °C (98 °F)    Resp 16    Ht 5' 3\" (1.6 m)    Wt 92.1 kg (203 lb)    SpO2 93%    BMI 35.96 kg/m2       Patient is status post general anesthesia for Procedure(s):  LIGATION OF LEFT ARTERIO VENOUS GRAFT . Nausea/Vomiting: None    Postoperative hydration reviewed and adequate. Pain:  Pain Scale 1: Numeric (0 - 10) (05/01/18 1634)  Pain Intensity 1: 4 (05/01/18 1634)   Managed    Neurological Status:   Neuro (WDL): Within Defined Limits (05/01/18 1631)   At baseline    Mental Status and Level of Consciousness: Arousable    Pulmonary Status:   O2 Device: Nasal cannula (05/01/18 1631)   Adequate oxygenation and airway patent    Complications related to anesthesia: None    Post-anesthesia assessment completed.  No concerns    Signed By: Nelly Granda MD     May 1, 2018

## 2018-05-01 NOTE — IP AVS SNAPSHOT
[address] 
[phone] Patient: Mary Morris MRN: [id number] KUJ:[birth year] About your hospitalization You were admitted on:  May 1, 2018 You last received care in the:  [facility] SURGERY You were discharged on:  May 1, 2018 Why you were hospitalized Your primary diagnosis was:  Not on File Follow-up Information Follow up With Details Comments Contact Info Trae Muse MD   [facility] [address] 
[phone] Your Scheduled Appointments Thursday May 24, 2018  1:30 PM EDT Global Post Op with Vicki Garcia MD  
[facility]ASCULAR SURGERY ([facility]) [address] [address] [phone] Thursday May 31, 2018  8:40 AM EDT  
(Arrive by 8:20 AM) Return appointment with [facility] ([facility]) [address] [place] [address] [address] [place] [address]  
[phone] Discharge Orders None A check sydnee indicates which time of day the medication should be taken. My Medications CONTINUE taking these medications Instructions Each Dose to Equal  
 Morning Noon Evening Bedtime  
 acetaminophen 325 mg tablet Commonly known as:  TYLENOL Your last dose was: Your next dose is: Take 2 Tabs by mouth every four (4) hours as needed for Fever (for fever greater than 100.4 F). 650 mg  
    
   
   
   
  
 allopurinol 100 mg tablet Commonly known as:  Shante Fulling Your last dose was: Your next dose is: Take  by mouth every morning. bipap machine kit Your last dose was: Your next dose is:    
   
   
 by Does Not Apply route nightly.   
     
   
   
   
  
 calcitonin (salmon) nasal  
Commonly known as:  Regenia Marcial  
   
 Your last dose was: Your next dose is:    
   
   
 1 [place] by IntraNASal route daily. 1 Spray COLACE 100 mg capsule Generic drug:  docusate sodium Your last dose was: Your next dose is: Take 100 mg by mouth two (2) times a day. 100 mg DULCOLAX (BISACODYL) 10 mg suppository Generic drug:  bisacodyl Your last dose was: Your next dose is: Insert 10 mg into rectum daily. 10 mg FISH -160-1,000 mg Cap Generic drug:  omega 3-dha-epa-fish oil Your last dose was: Your next dose is: Take  by mouth daily. fludrocortisone 0.1 mg tablet Commonly known as:  FLORINEF Your last dose was: Your next dose is: Take  by mouth nightly. Indications: Symptomatic Orthostatic Hypotension HEMORRHOID rectal ointment Generic drug:  phenyleph-shark marcell oil-mo-pet Your last dose was: Your next dose is:    
   
   
 by PeriANAL route every six (6) hours as needed for Hemorrhoids. LANTUS SOLOSTAR U-100 INSULIN 100 unit/mL (3 mL) Inpn Generic drug:  insulin glargine Your last dose was: Your next dose is:    
   
   
 5 Units by SubCUTAneous route nightly. 5 Units  
    
   
   
   
  
 lidocaine-prilocain-0.9 % NaCl 2.5 % -2.5 % Kit Your last dose was: Your next dose is:    
   
   
 by Does Not Apply route. Apply to left inner forearm topically one time a day every MWF for numbing dialysis access. midodrine 10 mg tablet Commonly known as:  Natalia Hernandez Your last dose was: Your next dose is: Take  by mouth three (3) times daily. MIRALAX 17 gram packet Generic drug:  polyethylene glycol Your last dose was: Your next dose is: Take 17 g by mouth daily as needed. 17 g MUCINEX 600 mg ER tablet Generic drug:  guaiFENesin ER Your last dose was: Your next dose is: Take 1,200 mg by mouth every twelve (12) hours. 1200 mg NEURONTIN 100 mg capsule Generic drug:  gabapentin Your last dose was: Your next dose is: Take 100 mg by mouth daily. Indications: NEUROPATHIC PAIN  
 100 mg Omega-3 Fatty Acids 300 mg Cap Your last dose was: Your next dose is: Take 1,200 mg by mouth nightly. Last dose 6/20/16  
 1200 mg  
    
   
   
   
  
 omeprazole 20 mg capsule Commonly known as:  PRILOSEC Your last dose was: Your next dose is: Take 20 mg by mouth daily. 20 mg OXYGEN-AIR DELIVERY SYSTEMS Your last dose was: Your next dose is:    
   
   
 2 L by Nasal route continuous. 2 L CYR MILK OF MAGNESIA 400 mg/5 mL suspension Generic drug:  magnesium hydroxide Your last dose was: Your next dose is: Take 30 mL by mouth daily as needed for Constipation. 30 mL PLAVIX 75 mg Tab Generic drug:  clopidogrel Your last dose was: Your next dose is: Take  by mouth daily. LINDA-WAQAR PO Your last dose was: Your next dose is: Take 1 Tab by mouth daily. 1 Tab RENVELA 800 mg Tab tab Generic drug:  sevelamer carbonate Your last dose was: Your next dose is: Take 2,400 mg by mouth three (3) times daily (with meals). And snacks 2400 mg SYNTHROID 200 mcg tablet Generic drug:  levothyroxine Your last dose was: Your next dose is: Take 200 mcg by mouth Daily (before breakfast). 200 mcg ULTRAM 50 mg tablet Generic drug:  traMADol Your last dose was: Your next dose is: Take 50 mg by mouth every six (6) hours as needed for Pain. 50 mg  
    
   
   
   
  
 VITAMIN B-12 1,000 mcg tablet Generic drug:  cyanocobalamin Your last dose was: Your next dose is: Take 1,000 mcg by mouth every morning. 1000 mcg VITAMIN D2 50,000 unit capsule Generic drug:  ergocalciferol Your last dose was: Your next dose is: Take 50,000 Units by mouth. Twice per week 60261 Units  
    
   
   
   
  
 warfarin 5 mg tablet Commonly known as:  COUMADIN Your last dose was: Your next dose is: Take 7.5 mg by mouth. 7.5mg MWF alt with 5mg  
 7.5 mg  
    
   
   
   
  
  
  
  
Opioid Education Prescription Opioids: What You Need to Know: 
 
Prescription opioids can be used to help relieve moderate-to-severe pain and are often prescribed following a surgery or injury, or for certain health conditions. These medications can be an important part of treatment but also come with serious risks. Opioids are strong pain medicines. Examples include hydrocodone, oxycodone, fentanyl, and morphine. Heroin is an example of an illegal opioid. It is important to work with your health care provider to make sure you are getting the safest, most effective care. WHAT ARE THE RISKS AND SIDE EFFECTS OF OPIOID USE? Prescription opioids carry serious risks of addiction and overdose, especially with prolonged use. An opioid overdose, often marked by slow breathing, can cause sudden death. The use of prescription opioids can have a number of side effects as well, even when taken as directed. · Tolerance-meaning you might need to take more of a medication for the same pain relief · Physical dependence-meaning you have symptoms of withdrawal when the medication is stopped. Withdrawal symptoms can include nausea, sweating, chills, diarrhea, stomach cramps, and muscle aches. Withdrawal can last up to several weeks, depending on which drug you took and how long you took it. · Increased sensitivity to pain · Constipation · Nausea, vomiting, and dry mouth · Sleepiness and dizziness · Confusion · Depression · Low levels of testosterone that can result in lower sex drive, energy, and strength · Itching and sweating RISKS ARE GREATER WITH:      
· History of drug misuse, substance use disorder, or overdose · Mental health conditions (such as depression or anxiety) · Sleep apnea · Older age (72 years or older) · Pregnancy Avoid alcohol while taking prescription opioids. Also, unless specifically advised by your health care provider, medications to avoid include: · Benzodiazepines (such as Xanax or Valium) · Muscle relaxants (such as Soma or Flexeril) · Hypnotics (such as Ambien or Lunesta) · Other prescription opioids KNOW YOUR OPTIONS Talk to your health care provider about ways to manage your pain that don't involve prescription opioids. Some of these options may actually work better and have fewer risks and side effects. Options may include: 
· Pain relievers such as acetaminophen, ibuprofen, and naproxen · Some medications that are also used for depression or seizures · Physical therapy and exercise · Counseling to help patients learn how to cope better with triggers of pain and stress. · Application of heat or cold compress · Massage therapy · Relaxation techniques Be Informed Make sure you know the name of your medication, how much and how often to take it, and its potential risks & side effects. IF YOU ARE PRESCRIBED OPIOIDS FOR PAIN: 
· Never take opioids in greater amounts or more often than prescribed. Remember the goal is not to be pain-free but to manage your pain at a tolerable level. · Follow up with your primary care provider to: · Work together to create a plan on how to manage your pain. · Talk about ways to help manage your pain that don't involve prescription opioids. · Talk about any and all concerns and side effects. · Help prevent misuse and abuse. · Never sell or share prescription opioids · Help prevent misuse and abuse. · Store prescription opioids in a secure place and out of reach of others (this may include visitors, children, friends, and family). · Safely dispose of unused/unwanted prescription opioids: Find your community drug take-back program or your pharmacy mail-back program, or flush them down the toilet, following guidance from the Food and Drug Administration (www.fda.gov/Drugs/ResourcesForYou). · Visit www.cdc.gov/drugoverdose to learn about the risks of opioid abuse and overdose. · If you believe you may be struggling with addiction, tell your health care provider and ask for guidance or call [employer] at 9-359-063-WWYF. Discharge Instructions ACTIVITY · As tolerated and as directed by your doctor. · Keep arm straight and raised above heart level for the next 24 hours. DIET · Clear liquids until no nausea or vomiting; then light diet for the first day. · Advance to regular diet on second day, unless your doctor orders otherwise. · If nausea and vomiting continues, call your doctor. PAIN 
· Take pain medication as directed by your doctor. · Call your doctor if pain is NOT relieved by the medication. · DO NOT take aspirin or blood thinners until directed by your doctor. DRESSING CARE 
· Keep your dressing clean and dry. · Leave the dressing on until the dialysis nurse or your doctor takes it off. CARE OF YOUR ACCESS 
DO: 
· Keep access area clean with soap and water daily after dressing has been removed. · Feel for the thrill daily. (by placing your fingers over the graft you will be able to feel a vibration (thrill) which means blood is flowing and the graft is working.) DO NOT: 
· Wear tight sleeves, watches, belts or bracelets over graft. · Carry heavy bags across the graft. · Sleep on graft side. · Let your blood pressure be taken on graft side. · Let blood be drawn from your graft side. CALL YOUR DOCTOR IF 
· Excessive bleeding that does not stop after holding mild pressure over area. · Temperature of 101 degrees F or above. · Redness, excessive swelling or bruising, and/or green or yellow, smelly discharge from incision · Loss of sensation-cold, white, or blue fingers or toes. After general anesthesia or intravenous sedation, for 24 hours or while taking prescription Narcotics: · Limit your activities · Do not drive and operate hazardous machinery · Do not make important personal or business decisions · Do  not drink alcoholic beverages · If you have not urinated within 8 hours after discharge, please contact your surgeon on call. *  Please give a list of your current medications to your Primary Care Provider. *  Please update this list whenever your medications are discontinued, doses are 
    changed, or new medications (including over-the-counter products) are added. *  Please carry medication information at all times in case of emergency situations. These are general instructions for a healthy lifestyle: No smoking/ No tobacco products/ Avoid exposure to second hand smoke Surgeon General's Warning:  Quitting smoking now greatly reduces serious risk to your health. Obesity, smoking, and sedentary lifestyle greatly increases your risk for illness A healthy diet, regular physical exercise & weight monitoring are important for maintaining a healthy lifestyle Recognize signs and symptoms of STROKE: 
 
F-face looks uneven A-arms unable to move or move unevenly S-speech slurred or non-existent T-time-call 911 as soon as signs and symptoms begin-DO NOT go Back to bed or wait to see if you get better-TIME IS BRAIN. Introducing [facility] [place] [facility]! [facility] introduces [employer] patient portal. Now you can access parts of your medical record, email your doctor's office, and request medication refills online. 1. In your internet browser, go to [email or web address] 2. Click on the First Time User? Click Here link in the Sign In box. You will see the New Member Sign Up page. 3. Enter your [employer] Access Code exactly as it appears below. You will not need to use this code after youve completed the sign-up process. If you do not sign up before the expiration date, you must request a new code. · [employer] Access Code: [email or web address] Expires: 6/18/2018  7:57 AM 
 
4. Enter the last four digits of your Social Security Number (xxxx) and Date of Birth (mm/dd/yyyy) as indicated and click Submit. You will be taken to the next sign-up page. 5. Create a [employer] ID. This will be your [employer] login ID and cannot be changed, so think of one that is secure and easy to remember. 6. Create a [employer] password. You can change your password at any time. 7. Enter your Password Reset Question and Answer. This can be used at a later time if you forget your password. 8. Enter your e-mail address. You will receive e-mail notification when new information is available in [address] 9. Click Sign Up. You can now view and download portions of your medical record. 10. Click the Download Summary menu link to download a portable copy of your medical information. If you have questions, please visit the Frequently Asked Questions section of the [employer] website. Remember, [employer] is NOT to be used for urgent needs. For medical emergencies, dial 911. Now available from your iPhone and Android! Introducing Simone Ortiz As a [facility]elongo patient, I wanted to make you aware of our electronic visit tool called Simone Ortiz. [facility] 24/7 allows you to connect within minutes with a medical provider 24 hours a day, seven days a week via a mobile device or tablet or logging into a secure website from your computer. You can access Simone Noblefin from anywhere in the [place]. A virtual visit might be right for you when you have a simple condition and feel like you just dont want to get out of bed, or cant get away from work for an appointment, when your regular [facility] Montelongo provider is not available (evenings, weekends or holidays), or when youre out of town and need minor care. Electronic visits cost only $49 and if the Mari Montelongo 24/7 provider determines a prescription is needed to treat your condition, one can be electronically transmitted to a nearby pharmacy*. Please take a moment to enroll today if you have not already done so. The enrollment process is free and takes just a few minutes. To enroll, please download the [employer] 24/7 kyle to your tablet or phone, or visit [email or web address] to enroll on your computer. And, as an [address] [place] [address] patient with a [employer] account, the results of your visits will be scanned into your electronic medical record and your primary care provider will be able to view the scanned results. We urge you to continue to see your regular Mari Montelongo provider for your ongoing medical care. And while your primary care provider may not be the one available when you seek a Simone Bernabedwaynefin virtual visit, the peace of mind you get from getting a real diagnosis real time can be priceless. For more information on Simone Srinivasajon, view our Frequently Asked Questions (FAQs) at [email or web address]. Sincerely, 
 
Be Piña MD 
Chief Medical Officer Karri8 Collette Chu *:  certain medications cannot be prescribed via Simone Ortiz Providers Seen During Your Hospitalization Provider Specialty Primary office phone Roxana Calderon MD Vascular Surgery [phone] Your Primary Care Physician (PCP) Primary Care Physician Office Phone Office Fax [place], [address] You are allergic to the following Allergen Reactions Codeine Other (comments) Pt can not recall type of reaction -- only remembers being told allergic Oxycodone Not Reported This Time Patient denies allergy to medication Recent Documentation Height Weight BMI OB Status Smoking Status 1.6 m 92.1 kg 35.96 kg/m2 Hysterectomy Never Smoker Emergency Contacts Name Discharge Info Relation Home Work Mobile Spencer Pacheco  Spouse [3] [phone] Patient Belongings The following personal items are in your possession at time of discharge: 
  Dental Appliances: None Please provide this summary of care documentation to your next provider. Signatures-by signing, you are acknowledging that this After Visit Summary has been reviewed with you and you have received a copy. Patient Signature:  ____________________________________________________________ Date:  ____________________________________________________________  
  
Anastasia Barrow Provider Signature:  ____________________________________________________________ Date:  ____________________________________________________________

## 2018-05-01 NOTE — PERIOP NOTES
[facility] at bedside to transport patient and patient's  home at this time. Patient to be transported on 2L of oxygen via nasal cannula, stable and in no signs of distress prior to leaving PACU.

## 2018-05-02 NOTE — OP NOTES
[facility] REPORT    Marco A Vazquez  MR#: [id number]  : 1945  ACCOUNT #: [de-identified]   DATE OF SERVICE: 2018    PREOPERATIVE DIAGNOSIS:  End-stage renal disease with left upper extremity swelling (central venous stenosis). POSTOPERATIVE DIAGNOSIS:  End-stage renal disease with left upper extremity swelling (central venous stenosis). PROCEDURE:  Ligation of left AV graft. SURGEON:  Dillan Montana MD    ANESTHESIA:  General endotracheal.    ESTIMATED BLOOD LOSS:  10 mL. DRAINS:  None. SPECIMENS:  None. COMPLICATIONS:  None. IMPLANTS:      DESCRIPTION OF PROCEDURE:  The patient was brought to operating room and placed on the operating table in supine position. After adequate general endotracheal anesthesia and a time-out procedure, the left arm was prepped and draped in sterile fashion. A small transverse incision was made over the lateral limb of the graft, which was the venous limb. The wound was deepened with Bovie to control bleeding. The AV graft was identified, mobilized and encircled with a 2-0 silk tie distally and then two 2-0 silk ties were placed proximally on the arterial limb of the graft. The graft was then divided between the ties, leaving 2 silk ties on the arterial limb stump. With this completed, hemostasis was achieved and the wound was closed with interrupted nylon sutures. Sterile dry dressings were applied. The patient was awakened from anesthesia and transferred to recovery in stable condition. The patient tolerated procedure well. No complications. All needle, sponge counts were reported as correct.       MD Nain John / Allie Rush  D: 2018 15:18     T: 2018 05:34  JOB #: [id number]

## 2018-05-22 ENCOUNTER — PATIENT OUTREACH (OUTPATIENT)
Dept: CASE MANAGEMENT | Age: 73
End: 2018-05-22

## 2018-05-22 NOTE — PROGRESS NOTES
This note will not be viewable in [address] RNCM spoke w/ pt's , he reports pt is continuing to do well. Pt sees vasc surg this wk to remove stitches. He denies s/s of infection, denies other issues at this time. RNCM will graduate pt at this time as goals met.  will retain contact information for future questions or concerns.

## 2018-05-31 PROBLEM — J81.1 PULMONARY EDEMA: Status: RESOLVED | Noted: 2017-06-30 | Resolved: 2018-05-31

## 2018-05-31 PROBLEM — J96.00 ACUTE RESPIRATORY FAILURE (HCC): Status: RESOLVED | Noted: 2017-12-06 | Resolved: 2018-05-31

## 2018-12-11 ENCOUNTER — PATIENT OUTREACH (OUTPATIENT)
Dept: CASE MANAGEMENT | Age: 73
End: 2018-12-11

## 2019-08-26 ENCOUNTER — ANESTHESIA EVENT (OUTPATIENT)
Dept: ENDOSCOPY | Age: 74
End: 2019-08-26
Payer: MEDICARE

## 2019-08-26 RX ORDER — SODIUM CHLORIDE, SODIUM LACTATE, POTASSIUM CHLORIDE, CALCIUM CHLORIDE 600; 310; 30; 20 MG/100ML; MG/100ML; MG/100ML; MG/100ML
100 INJECTION, SOLUTION INTRAVENOUS CONTINUOUS
Status: CANCELLED | OUTPATIENT
Start: 2019-08-26

## 2019-08-27 ENCOUNTER — HOSPITAL ENCOUNTER (OUTPATIENT)
Age: 74
Setting detail: OUTPATIENT SURGERY
Discharge: HOME | End: 2019-08-27
Attending: INTERNAL MEDICINE | Admitting: INTERNAL MEDICINE
Payer: MEDICARE

## 2019-08-27 ENCOUNTER — ANESTHESIA (OUTPATIENT)
Dept: ENDOSCOPY | Age: 74
End: 2019-08-27
Payer: MEDICARE

## 2019-08-27 VITALS
SYSTOLIC BLOOD PRESSURE: 145 MMHG | HEIGHT: 63 IN | BODY MASS INDEX: 35.08 KG/M2 | OXYGEN SATURATION: 95 % | DIASTOLIC BLOOD PRESSURE: 66 MMHG | RESPIRATION RATE: 20 BRPM | WEIGHT: 198 LBS | HEART RATE: 56 BPM | TEMPERATURE: 98 F

## 2019-08-27 LAB
GLUCOSE BLD STRIP.AUTO-MCNC: 104 MG/DL (ref 65–100)
POTASSIUM BLD-SCNC: 4 MMOL/L (ref 3.5–5.1)

## 2019-08-27 PROCEDURE — 74011250636 HC RX REV CODE- 250/636

## 2019-08-27 PROCEDURE — 74011250636 HC RX REV CODE- 250/636: Performed by: INTERNAL MEDICINE

## 2019-08-27 PROCEDURE — 76060000031 HC ANESTHESIA FIRST 0.5 HR: Performed by: INTERNAL MEDICINE

## 2019-08-27 PROCEDURE — 84132 ASSAY OF SERUM POTASSIUM: CPT

## 2019-08-27 PROCEDURE — 77030013991 HC SNR POLYP ENDOSC BSC -A: Performed by: INTERNAL MEDICINE

## 2019-08-27 PROCEDURE — 82962 GLUCOSE BLOOD TEST: CPT

## 2019-08-27 PROCEDURE — 76040000025: Performed by: INTERNAL MEDICINE

## 2019-08-27 RX ORDER — LIDOCAINE HYDROCHLORIDE 20 MG/ML
INJECTION, SOLUTION EPIDURAL; INFILTRATION; INTRACAUDAL; PERINEURAL AS NEEDED
Status: DISCONTINUED | OUTPATIENT
Start: 2019-08-27 | End: 2019-08-27 | Stop reason: HOSPADM

## 2019-08-27 RX ORDER — SODIUM CHLORIDE 9 MG/ML
1000 INJECTION, SOLUTION INTRAVENOUS CONTINUOUS
Status: DISCONTINUED | OUTPATIENT
Start: 2019-08-27 | End: 2019-08-27 | Stop reason: HOSPADM

## 2019-08-27 RX ORDER — PROPOFOL 10 MG/ML
INJECTION, EMULSION INTRAVENOUS AS NEEDED
Status: DISCONTINUED | OUTPATIENT
Start: 2019-08-27 | End: 2019-08-27 | Stop reason: HOSPADM

## 2019-08-27 RX ORDER — PROPOFOL 10 MG/ML
INJECTION, EMULSION INTRAVENOUS
Status: DISCONTINUED | OUTPATIENT
Start: 2019-08-27 | End: 2019-08-27 | Stop reason: HOSPADM

## 2019-08-27 RX ADMIN — PROPOFOL 140 MCG/KG/MIN: 10 INJECTION, EMULSION INTRAVENOUS at 09:34

## 2019-08-27 RX ADMIN — SODIUM CHLORIDE 1000 ML: 900 INJECTION, SOLUTION INTRAVENOUS at 08:47

## 2019-08-27 RX ADMIN — LIDOCAINE HYDROCHLORIDE 60 MG: 20 INJECTION, SOLUTION EPIDURAL; INFILTRATION; INTRACAUDAL; PERINEURAL at 09:34

## 2019-08-27 RX ADMIN — PROPOFOL 20 MG: 10 INJECTION, EMULSION INTRAVENOUS at 09:34

## 2019-08-27 NOTE — DISCHARGE INSTRUCTIONS
Gastrointestinal Colonoscopy/Flexible Sigmoidoscopy - Lower Exam Discharge Instructions  1. Call Dr. Gina Shields at for any problems or questions. 2. Contact the doctors office for follow up appointment as directed  3. Medication may cause drowsiness for several hours, therefore:  · Do not drive or operate machinery for reminder of the day. · No alcohol today. · Do not make any important or legal decisions for 24 hours. · Do not sign any legal documents for 24 hours. 4. Ordinarily, you may resume regular diet and activity after exam unless otherwise specified by your physician. 5. Because of air put into your colon during exam, you may experience some abdominal distension, relieved by the passage of gas, for several hours. 6. Contact your physician if you have any of the following:  · Excessive amount of bleeding - large amount when having a bowel movement. Occasional specks of blood with bowel movement would not be unusual.  · Severe abdominal pain  · Fever or Chills  7. Polyp Removal - follow these additional instructions  · Clear liquid diet for the next meal (jell-o, broth, clear drinks)  · Soft diet for 24 hours, then resume regular diet   · Take Metamucil - 1 tablespoon in juice every morning for 3 days  · No Aspirin, Advil, Aleve, Nuprin, Ibuprofen, or medications that contain these drugs for 2 weeks. Any additional instructions:  Next colonoscopy in 5 years. Resume taking plavix today and other home medications. Instructions given to Rosalia Lee and other family members.

## 2019-08-27 NOTE — ROUTINE PROCESS
VSS. Pt denies pain and nausea. Educated pt and spouse on discharge instructions verbally and D/C paperwork provided. Pt and spouse verbalized understanding. Pt using O2 2LNC from home, O2 sat 98%, no SOB, pt weak from dialysis yesterday needs assist X2 to dress and to wheelchair,  states same as prior to procedure. Pt taken to car via wheelchair by [employer].

## 2019-08-27 NOTE — PROCEDURES
Colonoscopy Procedure Note    Procedure: Colonoscopy    Pre-operative Diagnosis: Screening   Personal hx of colon polyps TVA 2016     Post-operative Diagnosis:   Benign sessile polyp transverse-snared tissue loss and debris     Recommendations: The patient was advised not to drive today nor to make any important decisions. They may resume normal diet and medications unless otherwise instructed in recovery. Surveillance interval: 5 years     Anesthesia/Sedation:  MAC,, (see separate report). Procedure Details:  Informed consent was obtained for the procedure, including anesthesia. Risks of perforation, hemorrhage, adverse drug reaction and aspiration were discussed. The patient was placed in the left lateral decubitus position. Based on the pre-procedure assessment, including review of the patient's medical history, medications, allergies, and review of systems, she had been deemed to be an appropriate candidate for this procedure. A rectal examination was performed. The colonoscope was inserted into the rectum and advanced under direct vision to the terminal ileum. The quality of the colonic preparation was Fair but with copious washing was  adequate. A careful inspection was made as the colonoscope was withdrawn; findings and interventions are described below. Findings:   TERMINAL ILEUM: The terminal ileum was entered and appeared normal with a normal villous mucosa. COLON:  The colonic mucosa from the cecum to the rectum was carefully examined. The mucosa appeared normal with normal vascularity. Sessile transverse polyps cold snare-tissue was last in debris. There was no diverticulosis, inflammatory changes,  vascular ectasias or abnormal pigmentation. ANUS/RECTUM: Anal exam reveals no masses or hemorrhoids, sphincter tone is normal. Rectal exam reveals no masses or hemorrhoids.   Direct and retroflexed views of the rectum were significant for internalized anal papilla, otherwise  normal without significant hemorrhoidal engorgement or inflammation, polyps or masses. EBL: 0cc's. PATH:  None. Complications: None; patient tolerated the procedure well. Attending Attestation: I performed the procedure.     Eliseo Littlejohn MD

## 2019-08-27 NOTE — ANESTHESIA PREPROCEDURE EVALUATION
Anesthetic History   No history of anesthetic complications            Review of Systems / Medical History  Patient summary reviewed and pertinent labs reviewed    Pulmonary        Sleep apnea (3L QHS): BiPAP           Neuro/Psych       CVA (2014)       Cardiovascular    Hypertension: well controlled      CHF: NYHA Classification II  Dysrhythmias : SVT  CAD    Exercise tolerance: <4 METS  Comments: Echo with EF 35-40% and PFO    2017 echo at time with PE concern with RV pressure overload with septal flattening. Likely with chornic RV strain given comorbidities.    GI/Hepatic/Renal     GERD: well controlled    Renal disease (HD; mWf - last dialyzed wednesday): ESRD and dialysis  Liver disease (hepatomegaly)     Endo/Other    Diabetes: well controlled, type 2, using insulin  Hypothyroidism  Morbid obesity, arthritis and anemia     Other Findings   Comments: Gout  Fibromyalgia  Neuropathy  Hx sepsis  Vertigo since stroke  SLE    H/o lupus    DNR           Physical Exam    Airway  Mallampati: II  TM Distance: 4 - 6 cm  Neck ROM: normal range of motion   Mouth opening: Normal     Cardiovascular  Regular rate and rhythm,  S1 and S2 normal,  no murmur, click, rub, or gallop  Rhythm: regular  Rate: normal      Pertinent negatives: No murmur and peripheral edema   Dental  No notable dental hx       Pulmonary  Breath sounds clear to auscultation               Abdominal  GI exam deferred       Other Findings            Anesthetic Plan    ASA: 4  Anesthesia type: total IV anesthesia          Induction: Intravenous  Anesthetic plan and risks discussed with: Patient

## 2019-08-27 NOTE — ANESTHESIA POSTPROCEDURE EVALUATION
Procedure(s):  COLONOSCOPY/BMI 35  ENDOSCOPIC POLYPECTOMY. total IV anesthesia    Anesthesia Post Evaluation      Multimodal analgesia: multimodal analgesia used between 6 hours prior to anesthesia start to PACU discharge  Patient location during evaluation: bedside  Patient participation: complete - patient participated  Level of consciousness: awake and responsive to light touch  Pain management: adequate  Airway patency: patent  Anesthetic complications: no  Cardiovascular status: acceptable, hemodynamically stable, blood pressure returned to baseline and stable  Respiratory status: acceptable, unassisted, spontaneous ventilation and nonlabored ventilation  Hydration status: acceptable  Post anesthesia nausea and vomiting:  controlled      Vitals Value Taken Time   /67 8/27/2019  9:54 AM   Temp     Pulse 61 8/27/2019  9:58 AM   Resp 16 8/27/2019  9:52 AM   SpO2 100 % 8/27/2019  9:58 AM   Vitals shown include unvalidated device data.

## 2019-08-27 NOTE — H&P
Chief complaint/HPI and PMH:  Please refer to outpatient note below or attached to the chart. Today's exam:  LUNGS:  Clear and equal.  COR:  RRR without murmurs heard. NEURO:  A and Oriented fully. I have thoroughly explained the preparation, procedure and sedation process to the patient as well as the risks and alternatives. They will sign informed consent prior to the procedure. Reid Ferrell MD    >      Patient:   Karoline Reyes  YOB: 1945   Date:                        06/04/2019 10:15 AM   Visit Type:                  Office Visit  Referring Provider:  Tana Che MD [facility]  Primary Care Provider: Mayela Zuniga MD [facility]      This 69year old  patient was referred by Tana Che MD.  This 69year old female presents for History of colon polyps. History of Present Illness:  1. History of colon polyps   69year old known female patient to Dr. Sanjana Eden returns to the clinic today self referred for history of colon polyps. She denies any change in bowel habits, hematochezia. She has lost weight since beginning dialysis which she received M-W-F. She wears Oxygen 2L continuous for history of PE. She is on Plavix daily and followed by Dr. Junior Patricio. She has DM with A1C of 4.5. She has CHF and is followed by Dr. Celi Kern. She states she hasn't had symptoms in years. She uses a CPAP for CASSIE. She has a BM every other day. 6/23/2016 Colonoscopy by Dr. Sanjana Eden  1.5 cm pedunculated polyp in the distal descending colon. She had 4 polyps in the ascending and transverse colon 2-5mm. Pathology revealed a mixed tubulovillous adenoma with recommended repeat surveillance in 3 years.   EGD 4/11/13 by Dr. Sanjana Eden revealed esophagitis, small hiatal hernia and bile gastritis          PROBLEM LIST:     Problem Description Onset Date Chronic Clinical Status Notes   LLQ pain 02/16/2016          PAST MEDICAL/SURGICAL HISTORY   (Detailed)    Disease/disorder Onset Date Management Date Comments   ESRD    [facility] 2019 - on HD since ; makes a little urine- Dr. Gladis Pichardo   GERD    [facility] 2019 -   Hypotension    [facility] 2019 -   Hypothyroid    [facility] 2019 -   DANIEL    [facility] 2019 - 2013 cancer treatment center with [facility]   Kidney disease       Peripheral neuropathy    [facility] 2019 -   CVA    [facility] 2019 -   MERRY    [facility] 2019 -   Depression       Diabetes         Cholecystectomy     Congestive heart failure       Sleep apnea       Procedure History  Test Date Results Inter   EGD 2013 Hiatal hernia, Esophagitis, Bile reflux gastritis        Colonoscopy within normal limits  EGD -small hiatal hernia. Family History  (Detailed)  Relationship Family Member Name  Age at Death Condition Onset Age Cause of Death       Family history of alcoholic cirrhosis  N       No family history of Cancer, colon  N         Social History:  (Detailed)  She is , disabled. No history of tobacco product usage, alcohol or drug abuse, \"She has had transfusions\" but no tattoos, or other risks for hepatitis. Tobacco use reviewed. Preferred language is [place]. MARITAL STATUS/FAMILY/SOCIAL SUPPORT  Currently . Tobacco use status: Current non-smoker. Smoking status: Never smoker. SMOKING STATUS  Type Smoking Status Usage Per Day Years Used Total Pack Years    Never smoker        ALCOHOL  There is no history of alcohol use. CAFFEINE  The patient does not use caffeine. HOME ENVIRONMENT/SAFETY  The patient has not fallen in the last year.             CURRENT MEDICATIONS  Medication Name Sig Desc   midodrine 10 mg tablet take 1 tablet by oral route 3 times every day   Renal-Ana 0.8 mg tablet Take once daily   lidocaine HCl-hydrocortison ac apply by topical route 2 times every day to the affected area(s) Prior to dialysis   Fish Oil 1,000 mg (120 mg-180 mg) capsule Take once daily   Plavix 75 mg tablet take 1 tablet by oral route  every day fludrocortisone 0.1 mg tablet take 1 tablet by oral route  every day   levothyroxine 200 mcg tablet take 1 Tablet by oral route  every day   gabapentin 100 mg capsule take 2 Capsule by oral route 3 times every day   Renvela 800 mg tablet take 1 tablet by oral route 3 times every day with food   pantoprazole 40 mg tablet,delayed release take 1 tablet (40MG)  by oral route  every day   Vitamin B-12 1,000 mcg tablet 1 po dialy   Lantus Solostar 100 unit/mL (3 mL) Sub-Q Insulin Pen inject by subcutaneous route as per insulin protocol   allopurinol 100 mg tablet take 1 tablet (100MG)  by oral route  every day     Allergies:  Ingredient Reaction (Severity) Medication Name Comment   ACETAMINOPHEN  Lortab    CODEINE      HYDROCODONE BITARTRATE  Lortab    Reviewed, no changes. Review of Systems  System Neg/Pos Details   Constitutional Positive Fatigue. ENMT Positive Hearing deficit, Sleep apnea.  Positive Urinary difficulty. Neuro Positive Dizziness. Reproductive Positive Breast lumps. All other review of systems are negative. VITAL SIGNS:  BP mm/Hg Pulse Resp Pulse Ox Temp F Ht (Total in.) Weight (lbs.) Weight (oz.) BMI   120/60 70 16   63.00 198.40  35.14       PHYSICAL EXAM:  GENERAL: well nourished, well hydrated, who appears their stated age. HERE WITH SPOUSE. IN WHEELCHAIR (BUT AMBULATORY) ON O2  SKIN: no rashes, jaundice, palmar erythema, or telangiectasias   HEENT: Conjunctiva pink, Sclerae anicteric. No oral ulcerations. PERRLA. Neck without obvious thyromegaly. MALLAMPATI 2  CARDIOVASCULAR: S1 and S2. No murmurs, gallops or rubs. RESPIRATORY:  Vesicular breath sounds bilaterally without  rales or rhonchi. GI: The abdomen is soft, nondistended, and nontender with distracted exam. Normoactive bowel sounds x 4 quadrants. No obvious hepatomegaly or splenomegaly. RECTAL: Deferred.    EXTREMITIES: without cyanosis, clubbing, edema or ecchymosis  NEUROLOGICAL:  Patient is alert and oriented x 4 without any obvious gross deficits. PSYCHIATRIC: Mood appears appropriate with judgement intact. LYMPHATIC: No cervical or supraclavicular adenopathy. ASA 4    Assessment/Plan  # Detail Type Description    1. Assessment History of colon polyps (Z86.010). Provider Plan 69year old known female patient to Dr. Kerwin Cheek returns to the clinic today self referred for history of colon polyps. S  6/23/2016 Colonoscopy by Dr. Kerwin Cheek  1.5 cm pedunculated polyp in the distal descending colon. She had 4 polyps in the ascending and transverse colon 2-5mm. Pathology revealed a mixed tubulovillous adenoma with recommended repeat surveillance in 3 years. 1.Schedule surveillance colonoscopy by Dr. Kerwin Cheek at [facility] (due to HD and chronic O2 therapy) on a T-TH. Risks and benefits discussed with patient to include risk of infection,bleeding, perforation, anesthesia and possible mortality. She verbalized understanding and wishes to proceed with colonoscopy. 2.Fibercon or benefiber daily  3. Obtain recent echocardiogram from Dr. Rickey Enamorado Further diagnostic evaluations ordered today include(s) Colonoscopy to be performed. She is to schedule a follow-up visit to be determined after testing/procedure. 2. Assessment Long term current use of anticoagulant (Z79.01). Provider Plan She is on Plavix daily and followed by Dr. Cookie Terry for history of pulmonary embolus. 1. Contact Dr. Richard Schwab office to determine if we can hold her Plavix for 5 days prior to her procedure. She may need a lovenox bridge. 3. Assessment Stage 5 chronic kidney disease on chronic dialysis (N18.6). Provider Plan M-W-F                  Fall Risk Plan  The patient has not fallen in the last year. Counseling / Educational Factors:  Items reviewed / discussed during today's visit: prior testing / procedures were reviewed; indications and risks of the procedure were discussed; symptomatic care was discussed.  Patient expressed understanding of instructions. The patient was checked out at 10:29 AM by Kay Pineda. Elements of this note may have been dictated using speech recognition software. As a result, errors of speech recognition may have occurred. Provider:   Juliana Faustin 06/04/2019 10:33 AM   Document generated by: Cherelle Nelson 06/04/2019    CC Providers:  Renée Felder MD, MD  [facility]  [facility],  [address] MD   [facility]  [place], [address] [place] [address]-      Electronically signed by Cristiano Fernandez on 06/04/2019 10:33 AM

## 2019-10-09 NOTE — PROGRESS NOTES
Warfarin dosing per pharmacist    Chema Salas is a 81 y.o. female. Height: 5' 3\" (160 cm)    Weight: 90 kg (198 lb 8 oz)    Indication:  Bilateral PE's, s/p clot burden reduction with EKOS    Goal INR:  2-3    Home dose:  New start    Risk factors or significant drug interactions:  Levothyroxine (appears euthyroid)    Other anticoagulants:  Heparin drip for bridge therapy while INR subtherapeutic    Daily Monitoring  Date  INR      Warfarin dose HGB              Notes  12/11  1  5 mg  ---    12/12  1.3   5 mg  8.1    Pharmacy consulted to assist dosing warfarin in patient who presented with large bilateral PE's s/p EKOS on 12/8 for clot burden reduction. Will start with 5 mg x 2 doses then assess for reduction based on INR. Would continue bridge therapy with heparin drip for at least 5 days and until INR therapeutic. Will continue to follow.     Thank you,  Farooq Montero, PharmD  Clinical Pharmacist  [phone] patient

## 2020-01-01 ENCOUNTER — ANESTHESIA EVENT (OUTPATIENT)
Dept: SURGERY | Age: 75
End: 2020-01-01
Payer: MEDICARE

## 2020-01-01 ENCOUNTER — APPOINTMENT (OUTPATIENT)
Dept: ULTRASOUND IMAGING | Age: 75
End: 2020-01-01
Attending: SURGERY
Payer: MEDICARE

## 2020-01-01 ENCOUNTER — APPOINTMENT (OUTPATIENT)
Dept: GENERAL RADIOLOGY | Age: 75
End: 2020-01-01
Attending: SURGERY
Payer: MEDICARE

## 2020-01-01 ENCOUNTER — HOSPITAL ENCOUNTER (EMERGENCY)
Age: 75
Discharge: HOME | End: 2020-12-29
Attending: EMERGENCY MEDICINE
Payer: MEDICARE

## 2020-01-01 ENCOUNTER — APPOINTMENT (OUTPATIENT)
Dept: GENERAL RADIOLOGY | Age: 75
DRG: 189 | End: 2020-01-01
Attending: EMERGENCY MEDICINE
Payer: MEDICARE

## 2020-01-01 ENCOUNTER — HOSPITAL ENCOUNTER (INPATIENT)
Age: 75
LOS: 2 days | Discharge: HOME | DRG: 189 | End: 2020-12-04
Attending: EMERGENCY MEDICINE | Admitting: INTERNAL MEDICINE
Payer: MEDICARE

## 2020-01-01 ENCOUNTER — ANESTHESIA (OUTPATIENT)
Dept: SURGERY | Age: 75
End: 2020-01-01
Payer: MEDICARE

## 2020-01-01 ENCOUNTER — APPOINTMENT (OUTPATIENT)
Dept: GENERAL RADIOLOGY | Age: 75
End: 2020-01-01
Attending: EMERGENCY MEDICINE
Payer: MEDICARE

## 2020-01-01 ENCOUNTER — HOSPITAL ENCOUNTER (OUTPATIENT)
Age: 75
Setting detail: OUTPATIENT SURGERY
Discharge: HOME | End: 2020-07-23
Attending: SURGERY | Admitting: SURGERY
Payer: MEDICARE

## 2020-01-01 ENCOUNTER — PATIENT OUTREACH (OUTPATIENT)
Dept: CASE MANAGEMENT | Age: 75
End: 2020-01-01

## 2020-01-01 ENCOUNTER — HOSPITAL ENCOUNTER (OUTPATIENT)
Age: 75
Setting detail: OUTPATIENT SURGERY
Discharge: HOME | End: 2020-11-25
Attending: SURGERY | Admitting: SURGERY
Payer: MEDICARE

## 2020-01-01 VITALS
DIASTOLIC BLOOD PRESSURE: 83 MMHG | HEART RATE: 74 BPM | HEIGHT: 63 IN | BODY MASS INDEX: 35.44 KG/M2 | SYSTOLIC BLOOD PRESSURE: 169 MMHG | WEIGHT: 200 LBS | RESPIRATION RATE: 18 BRPM | TEMPERATURE: 98.2 F | OXYGEN SATURATION: 98 %

## 2020-01-01 VITALS
HEART RATE: 87 BPM | DIASTOLIC BLOOD PRESSURE: 47 MMHG | OXYGEN SATURATION: 96 % | WEIGHT: 203.4 LBS | RESPIRATION RATE: 18 BRPM | BODY MASS INDEX: 36.04 KG/M2 | TEMPERATURE: 98 F | SYSTOLIC BLOOD PRESSURE: 132 MMHG | HEIGHT: 63 IN

## 2020-01-01 VITALS
SYSTOLIC BLOOD PRESSURE: 141 MMHG | BODY MASS INDEX: 36.8 KG/M2 | WEIGHT: 200 LBS | HEIGHT: 62 IN | TEMPERATURE: 97.1 F | HEART RATE: 71 BPM | DIASTOLIC BLOOD PRESSURE: 63 MMHG | RESPIRATION RATE: 15 BRPM | OXYGEN SATURATION: 97 %

## 2020-01-01 VITALS
DIASTOLIC BLOOD PRESSURE: 56 MMHG | TEMPERATURE: 98.2 F | RESPIRATION RATE: 18 BRPM | HEIGHT: 62 IN | SYSTOLIC BLOOD PRESSURE: 122 MMHG | OXYGEN SATURATION: 98 % | BODY MASS INDEX: 38.02 KG/M2 | WEIGHT: 206.6 LBS | HEART RATE: 63 BPM

## 2020-01-01 DIAGNOSIS — N18.6 ESRD (END STAGE RENAL DISEASE) (HCC): Primary | ICD-10-CM

## 2020-01-01 DIAGNOSIS — E87.5 ACUTE HYPERKALEMIA: ICD-10-CM

## 2020-01-01 DIAGNOSIS — R19.7 DIARRHEA, UNSPECIFIED TYPE: ICD-10-CM

## 2020-01-01 DIAGNOSIS — J96.11 CHRONIC RESPIRATORY FAILURE WITH HYPOXIA (HCC): ICD-10-CM

## 2020-01-01 DIAGNOSIS — N19 UREMIA OF RENAL ORIGIN: ICD-10-CM

## 2020-01-01 DIAGNOSIS — R53.83 MALAISE AND FATIGUE: Primary | ICD-10-CM

## 2020-01-01 DIAGNOSIS — R53.81 MALAISE AND FATIGUE: Primary | ICD-10-CM

## 2020-01-01 DIAGNOSIS — E87.70 HYPERVOLEMIA, UNSPECIFIED HYPERVOLEMIA TYPE: ICD-10-CM

## 2020-01-01 LAB
ALBUMIN SERPL-MCNC: 3 G/DL (ref 3.2–4.6)
ALBUMIN SERPL-MCNC: 3.1 G/DL (ref 3.2–4.6)
ALBUMIN SERPL-MCNC: 3.1 G/DL (ref 3.2–4.6)
ALBUMIN/GLOB SERPL: 0.6 {RATIO} (ref 1.2–3.5)
ALBUMIN/GLOB SERPL: 0.6 {RATIO} (ref 1.2–3.5)
ALBUMIN/GLOB SERPL: 0.7 {RATIO} (ref 1.2–3.5)
ALP SERPL-CCNC: 133 U/L (ref 50–136)
ALP SERPL-CCNC: 152 U/L (ref 50–136)
ALP SERPL-CCNC: 161 U/L (ref 50–136)
ALT SERPL-CCNC: 12 U/L (ref 12–65)
ALT SERPL-CCNC: 30 U/L (ref 12–65)
ALT SERPL-CCNC: 9 U/L (ref 12–65)
ANION GAP SERPL CALC-SCNC: 11 MMOL/L (ref 7–16)
ANION GAP SERPL CALC-SCNC: 11 MMOL/L (ref 7–16)
ANION GAP SERPL CALC-SCNC: 13 MMOL/L (ref 7–16)
ANION GAP SERPL CALC-SCNC: 7 MMOL/L (ref 7–16)
AST SERPL-CCNC: 24 U/L (ref 15–37)
AST SERPL-CCNC: 24 U/L (ref 15–37)
AST SERPL-CCNC: 42 U/L (ref 15–37)
ATRIAL RATE: 75 BPM
ATRIAL RATE: 92 BPM
BASOPHILS # BLD: 0 K/UL (ref 0–0.2)
BASOPHILS # BLD: 0.1 K/UL (ref 0–0.2)
BASOPHILS # BLD: 0.1 K/UL (ref 0–0.2)
BASOPHILS NFR BLD: 0 % (ref 0–2)
BASOPHILS NFR BLD: 1 % (ref 0–2)
BASOPHILS NFR BLD: 1 % (ref 0–2)
BILIRUB SERPL-MCNC: 0.5 MG/DL (ref 0.2–1.1)
BILIRUB SERPL-MCNC: 0.5 MG/DL (ref 0.2–1.1)
BILIRUB SERPL-MCNC: 1 MG/DL (ref 0.2–1.1)
BUN SERPL-MCNC: 23 MG/DL (ref 8–23)
BUN SERPL-MCNC: 52 MG/DL (ref 8–23)
BUN SERPL-MCNC: 85 MG/DL (ref 8–23)
BUN SERPL-MCNC: 92 MG/DL (ref 8–23)
CALCIUM SERPL-MCNC: 8.5 MG/DL (ref 8.3–10.4)
CALCIUM SERPL-MCNC: 8.6 MG/DL (ref 8.3–10.4)
CALCIUM SERPL-MCNC: 9 MG/DL (ref 8.3–10.4)
CALCIUM SERPL-MCNC: 9.2 MG/DL (ref 8.3–10.4)
CALCULATED P AXIS, ECG09: 107 DEGREES
CALCULATED P AXIS, ECG09: 76 DEGREES
CALCULATED R AXIS, ECG10: 108 DEGREES
CALCULATED R AXIS, ECG10: 37 DEGREES
CALCULATED T AXIS, ECG11: 165 DEGREES
CALCULATED T AXIS, ECG11: 48 DEGREES
CHLORIDE SERPL-SCNC: 100 MMOL/L (ref 98–107)
CHLORIDE SERPL-SCNC: 101 MMOL/L (ref 98–107)
CHLORIDE SERPL-SCNC: 97 MMOL/L (ref 98–107)
CHLORIDE SERPL-SCNC: 99 MMOL/L (ref 98–107)
CO2 SERPL-SCNC: 23 MMOL/L (ref 21–32)
CO2 SERPL-SCNC: 26 MMOL/L (ref 21–32)
CO2 SERPL-SCNC: 26 MMOL/L (ref 21–32)
CO2 SERPL-SCNC: 29 MMOL/L (ref 21–32)
COVID-19 RAPID TEST, COVR: DETECTED
CREAT SERPL-MCNC: 12.1 MG/DL (ref 0.6–1)
CREAT SERPL-MCNC: 13 MG/DL (ref 0.6–1)
CREAT SERPL-MCNC: 5.72 MG/DL (ref 0.6–1)
CREAT SERPL-MCNC: 8.81 MG/DL (ref 0.6–1)
DIAGNOSIS, 93000: NORMAL
DIAGNOSIS, 93000: NORMAL
DIFFERENTIAL METHOD BLD: ABNORMAL
EOSINOPHIL # BLD: 0 K/UL (ref 0–0.8)
EOSINOPHIL # BLD: 0.3 K/UL (ref 0–0.8)
EOSINOPHIL # BLD: 0.3 K/UL (ref 0–0.8)
EOSINOPHIL NFR BLD: 0 % (ref 0.5–7.8)
EOSINOPHIL NFR BLD: 2 % (ref 0.5–7.8)
EOSINOPHIL NFR BLD: 4 % (ref 0.5–7.8)
ERYTHROCYTE [DISTWIDTH] IN BLOOD BY AUTOMATED COUNT: 15.7 % (ref 11.9–14.6)
ERYTHROCYTE [DISTWIDTH] IN BLOOD BY AUTOMATED COUNT: 15.8 % (ref 11.9–14.6)
ERYTHROCYTE [DISTWIDTH] IN BLOOD BY AUTOMATED COUNT: 15.8 % (ref 11.9–14.6)
ERYTHROCYTE [DISTWIDTH] IN BLOOD BY AUTOMATED COUNT: 15.9 % (ref 11.9–14.6)
GLOBULIN SER CALC-MCNC: 4.3 G/DL (ref 2.3–3.5)
GLOBULIN SER CALC-MCNC: 4.8 G/DL (ref 2.3–3.5)
GLOBULIN SER CALC-MCNC: 4.8 G/DL (ref 2.3–3.5)
GLUCOSE BLD STRIP.AUTO-MCNC: 106 MG/DL (ref 65–100)
GLUCOSE BLD STRIP.AUTO-MCNC: 111 MG/DL (ref 65–100)
GLUCOSE BLD STRIP.AUTO-MCNC: 120 MG/DL (ref 65–100)
GLUCOSE BLD STRIP.AUTO-MCNC: 132 MG/DL (ref 65–100)
GLUCOSE BLD STRIP.AUTO-MCNC: 155 MG/DL (ref 65–100)
GLUCOSE BLD STRIP.AUTO-MCNC: 65 MG/DL (ref 65–100)
GLUCOSE BLD STRIP.AUTO-MCNC: 79 MG/DL (ref 65–100)
GLUCOSE BLD STRIP.AUTO-MCNC: 83 MG/DL (ref 65–100)
GLUCOSE SERPL-MCNC: 102 MG/DL (ref 65–100)
GLUCOSE SERPL-MCNC: 128 MG/DL (ref 65–100)
GLUCOSE SERPL-MCNC: 148 MG/DL (ref 65–100)
GLUCOSE SERPL-MCNC: 65 MG/DL (ref 65–100)
HBV SURFACE AG SER QL: NONREACTIVE
HCT VFR BLD AUTO: 29.5 % (ref 35.8–46.3)
HCT VFR BLD AUTO: 30.1 % (ref 35.8–46.3)
HCT VFR BLD AUTO: 30.6 % (ref 35.8–46.3)
HCT VFR BLD AUTO: 31.9 % (ref 35.8–46.3)
HGB BLD-MCNC: 10.1 G/DL (ref 11.7–15.4)
HGB BLD-MCNC: 10.3 G/DL (ref 11.7–15.4)
HGB BLD-MCNC: 9 G/DL (ref 11.7–15.4)
HGB BLD-MCNC: 9.1 G/DL (ref 11.7–15.4)
HGB BLD-MCNC: 9.5 G/DL (ref 11.7–15.4)
HGB BLD-MCNC: 9.7 G/DL (ref 11.7–15.4)
IMM GRANULOCYTES # BLD AUTO: 0.1 K/UL (ref 0–0.5)
IMM GRANULOCYTES # BLD AUTO: 0.1 K/UL (ref 0–0.5)
IMM GRANULOCYTES # BLD AUTO: 0.2 K/UL (ref 0–0.5)
IMM GRANULOCYTES NFR BLD AUTO: 1 % (ref 0–5)
IMM GRANULOCYTES NFR BLD AUTO: 1 % (ref 0–5)
IMM GRANULOCYTES NFR BLD AUTO: 2 % (ref 0–5)
LACTATE SERPL-SCNC: 0.8 MMOL/L (ref 0.4–2)
LACTATE SERPL-SCNC: 1.3 MMOL/L (ref 0.4–2)
LIPASE SERPL-CCNC: 134 U/L (ref 73–393)
LYMPHOCYTES # BLD: 0.9 K/UL (ref 0.5–4.6)
LYMPHOCYTES # BLD: 1.3 K/UL (ref 0.5–4.6)
LYMPHOCYTES # BLD: 1.4 K/UL (ref 0.5–4.6)
LYMPHOCYTES NFR BLD: 12 % (ref 13–44)
LYMPHOCYTES NFR BLD: 13 % (ref 13–44)
LYMPHOCYTES NFR BLD: 14 % (ref 13–44)
MAGNESIUM SERPL-MCNC: 2.1 MG/DL (ref 1.8–2.4)
MAGNESIUM SERPL-MCNC: 2.4 MG/DL (ref 1.8–2.4)
MCH RBC QN AUTO: 33.3 PG (ref 26.1–32.9)
MCH RBC QN AUTO: 33.3 PG (ref 26.1–32.9)
MCH RBC QN AUTO: 33.6 PG (ref 26.1–32.9)
MCH RBC QN AUTO: 33.8 PG (ref 26.1–32.9)
MCHC RBC AUTO-ENTMCNC: 29.9 G/DL (ref 31.4–35)
MCHC RBC AUTO-ENTMCNC: 30.4 G/DL (ref 31.4–35)
MCHC RBC AUTO-ENTMCNC: 30.8 G/DL (ref 31.4–35)
MCHC RBC AUTO-ENTMCNC: 31 G/DL (ref 31.4–35)
MCV RBC AUTO: 107.4 FL (ref 79.6–97.8)
MCV RBC AUTO: 109.7 FL (ref 79.6–97.8)
MCV RBC AUTO: 110.4 FL (ref 79.6–97.8)
MCV RBC AUTO: 111.5 FL (ref 79.6–97.8)
MONOCYTES # BLD: 0.4 K/UL (ref 0.1–1.3)
MONOCYTES # BLD: 0.5 K/UL (ref 0.1–1.3)
MONOCYTES # BLD: 0.6 K/UL (ref 0.1–1.3)
MONOCYTES NFR BLD: 4 % (ref 4–12)
MONOCYTES NFR BLD: 6 % (ref 4–12)
MONOCYTES NFR BLD: 6 % (ref 4–12)
NEUTS SEG # BLD: 5.7 K/UL (ref 1.7–8.2)
NEUTS SEG # BLD: 7.1 K/UL (ref 1.7–8.2)
NEUTS SEG # BLD: 9.2 K/UL (ref 1.7–8.2)
NEUTS SEG NFR BLD: 76 % (ref 43–78)
NEUTS SEG NFR BLD: 77 % (ref 43–78)
NEUTS SEG NFR BLD: 80 % (ref 43–78)
NRBC # BLD: 0 K/UL (ref 0–0.2)
P-R INTERVAL, ECG05: 186 MS
P-R INTERVAL, ECG05: 190 MS
PHOSPHATE SERPL-MCNC: 6.7 MG/DL (ref 2.3–3.7)
PLATELET # BLD AUTO: 128 K/UL (ref 150–450)
PLATELET # BLD AUTO: 135 K/UL (ref 150–450)
PLATELET # BLD AUTO: 135 K/UL (ref 150–450)
PLATELET # BLD AUTO: 148 K/UL (ref 150–450)
PMV BLD AUTO: 10.2 FL (ref 9.4–12.3)
PMV BLD AUTO: 10.2 FL (ref 9.4–12.3)
PMV BLD AUTO: 9.4 FL (ref 9.4–12.3)
PMV BLD AUTO: 9.8 FL (ref 9.4–12.3)
POTASSIUM BLD-SCNC: 4.3 MMOL/L (ref 3.5–5.1)
POTASSIUM BLD-SCNC: 4.9 MMOL/L (ref 3.5–5.1)
POTASSIUM SERPL-SCNC: 3.7 MMOL/L (ref 3.5–5.1)
POTASSIUM SERPL-SCNC: 4.8 MMOL/L (ref 3.5–5.1)
POTASSIUM SERPL-SCNC: 5.3 MMOL/L (ref 3.5–5.1)
POTASSIUM SERPL-SCNC: 6.1 MMOL/L (ref 3.5–5.1)
POTASSIUM SERPL-SCNC: 6.1 MMOL/L (ref 3.5–5.1)
PROT SERPL-MCNC: 7.4 G/DL (ref 6.3–8.2)
PROT SERPL-MCNC: 7.8 G/DL (ref 6.3–8.2)
PROT SERPL-MCNC: 7.9 G/DL (ref 6.3–8.2)
Q-T INTERVAL, ECG07: 440 MS
Q-T INTERVAL, ECG07: 446 MS
QRS DURATION, ECG06: 176 MS
QRS DURATION, ECG06: 176 MS
QTC CALCULATION (BEZET), ECG08: 498 MS
QTC CALCULATION (BEZET), ECG08: 544 MS
RBC # BLD AUTO: 2.69 M/UL (ref 4.05–5.2)
RBC # BLD AUTO: 2.7 M/UL (ref 4.05–5.2)
RBC # BLD AUTO: 2.85 M/UL (ref 4.05–5.2)
RBC # BLD AUTO: 2.89 M/UL (ref 4.05–5.2)
SODIUM SERPL-SCNC: 134 MMOL/L (ref 136–145)
SODIUM SERPL-SCNC: 135 MMOL/L (ref 136–145)
SODIUM SERPL-SCNC: 137 MMOL/L (ref 136–145)
SODIUM SERPL-SCNC: 137 MMOL/L (ref 138–145)
SOURCE, COVRS: ABNORMAL
TROPONIN-HIGH SENSITIVITY: 39.7 PG/ML (ref 0–14)
TROPONIN-HIGH SENSITIVITY: 44.7 PG/ML (ref 0–14)
VENTRICULAR RATE, ECG03: 75 BPM
VENTRICULAR RATE, ECG03: 92 BPM
WBC # BLD AUTO: 11.5 K/UL (ref 4.3–11.1)
WBC # BLD AUTO: 6.7 K/UL (ref 4.3–11.1)
WBC # BLD AUTO: 7.4 K/UL (ref 4.3–11.1)
WBC # BLD AUTO: 9.2 K/UL (ref 4.3–11.1)

## 2020-01-01 PROCEDURE — 74011636320 HC RX REV CODE- 636/320: Performed by: SURGERY

## 2020-01-01 PROCEDURE — 76010000161 HC OR TIME 1 TO 1.5 HR INTENSV-TIER 1: Performed by: SURGERY

## 2020-01-01 PROCEDURE — 84100 ASSAY OF PHOSPHORUS: CPT

## 2020-01-01 PROCEDURE — 77030002933 HC SUT MCRYL J&J -A: Performed by: SURGERY

## 2020-01-01 PROCEDURE — 2709999900 HC NON-CHARGEABLE SUPPLY

## 2020-01-01 PROCEDURE — 77030040922 HC BLNKT HYPOTHRM STRY -A: Performed by: STUDENT IN AN ORGANIZED HEALTH CARE EDUCATION/TRAINING PROGRAM

## 2020-01-01 PROCEDURE — 93005 ELECTROCARDIOGRAM TRACING: CPT | Performed by: EMERGENCY MEDICINE

## 2020-01-01 PROCEDURE — 77030031139 HC SUT VCRL2 J&J -A: Performed by: SURGERY

## 2020-01-01 PROCEDURE — 74011250636 HC RX REV CODE- 250/636: Performed by: SURGERY

## 2020-01-01 PROCEDURE — 82962 GLUCOSE BLOOD TEST: CPT

## 2020-01-01 PROCEDURE — 36591 DRAW BLOOD OFF VENOUS DEVICE: CPT

## 2020-01-01 PROCEDURE — 74011000250 HC RX REV CODE- 250: Performed by: SURGERY

## 2020-01-01 PROCEDURE — 76000 FLUOROSCOPY <1 HR PHYS/QHP: CPT

## 2020-01-01 PROCEDURE — 71045 X-RAY EXAM CHEST 1 VIEW: CPT

## 2020-01-01 PROCEDURE — 77030040922 HC BLNKT HYPOTHRM STRY -A: Performed by: ANESTHESIOLOGY

## 2020-01-01 PROCEDURE — 83690 ASSAY OF LIPASE: CPT

## 2020-01-01 PROCEDURE — 74011250636 HC RX REV CODE- 250/636: Performed by: NURSE PRACTITIONER

## 2020-01-01 PROCEDURE — 77030002987 HC SUT PROL J&J -B: Performed by: SURGERY

## 2020-01-01 PROCEDURE — 76060000033 HC ANESTHESIA 1 TO 1.5 HR: Performed by: SURGERY

## 2020-01-01 PROCEDURE — 74011250637 HC RX REV CODE- 250/637: Performed by: NURSE PRACTITIONER

## 2020-01-01 PROCEDURE — C1757 CATH, THROMBECTOMY/EMBOLECT: HCPCS | Performed by: SURGERY

## 2020-01-01 PROCEDURE — 99285 EMERGENCY DEPT VISIT HI MDM: CPT

## 2020-01-01 PROCEDURE — 76210000021 HC REC RM PH II 0.5 TO 1 HR: Performed by: SURGERY

## 2020-01-01 PROCEDURE — 77030034888 HC SUT PROL 2 J&J -B: Performed by: SURGERY

## 2020-01-01 PROCEDURE — 85025 COMPLETE CBC W/AUTO DIFF WBC: CPT

## 2020-01-01 PROCEDURE — 74011250636 HC RX REV CODE- 250/636: Performed by: NURSE ANESTHETIST, CERTIFIED REGISTERED

## 2020-01-01 PROCEDURE — 83735 ASSAY OF MAGNESIUM: CPT

## 2020-01-01 PROCEDURE — 74011250637 HC RX REV CODE- 250/637: Performed by: ANESTHESIOLOGY

## 2020-01-01 PROCEDURE — 93005 ELECTROCARDIOGRAM TRACING: CPT

## 2020-01-01 PROCEDURE — 90935 HEMODIALYSIS ONE EVALUATION: CPT

## 2020-01-01 PROCEDURE — 97161 PT EVAL LOW COMPLEX 20 MIN: CPT | Performed by: PHYSICAL THERAPIST

## 2020-01-01 PROCEDURE — 97165 OT EVAL LOW COMPLEX 30 MIN: CPT

## 2020-01-01 PROCEDURE — 71046 X-RAY EXAM CHEST 2 VIEWS: CPT

## 2020-01-01 PROCEDURE — 87635 SARS-COV-2 COVID-19 AMP PRB: CPT

## 2020-01-01 PROCEDURE — 85018 HEMOGLOBIN: CPT

## 2020-01-01 PROCEDURE — 94660 CPAP INITIATION&MGMT: CPT

## 2020-01-01 PROCEDURE — 65660000000 HC RM CCU STEPDOWN

## 2020-01-01 PROCEDURE — 80053 COMPREHEN METABOLIC PANEL: CPT

## 2020-01-01 PROCEDURE — 87340 HEPATITIS B SURFACE AG IA: CPT

## 2020-01-01 PROCEDURE — 80048 BASIC METABOLIC PNL TOTAL CA: CPT

## 2020-01-01 PROCEDURE — 83605 ASSAY OF LACTIC ACID: CPT

## 2020-01-01 PROCEDURE — 74011000250 HC RX REV CODE- 250: Performed by: NURSE ANESTHETIST, CERTIFIED REGISTERED

## 2020-01-01 PROCEDURE — 85027 COMPLETE CBC AUTOMATED: CPT

## 2020-01-01 PROCEDURE — 97110 THERAPEUTIC EXERCISES: CPT | Performed by: PHYSICAL THERAPIST

## 2020-01-01 PROCEDURE — 97535 SELF CARE MNGMENT TRAINING: CPT

## 2020-01-01 PROCEDURE — 76210000006 HC OR PH I REC 0.5 TO 1 HR: Performed by: SURGERY

## 2020-01-01 PROCEDURE — 77030021532 HC CATH ANGI DX IMPRS MRTM -B: Performed by: SURGERY

## 2020-01-01 PROCEDURE — 36415 COLL VENOUS BLD VENIPUNCTURE: CPT

## 2020-01-01 PROCEDURE — 84132 ASSAY OF SERUM POTASSIUM: CPT

## 2020-01-01 PROCEDURE — C1725 CATH, TRANSLUMIN NON-LASER: HCPCS | Performed by: SURGERY

## 2020-01-01 PROCEDURE — 77030013058 HC DEV INFL ANGIO BSC -B: Performed by: SURGERY

## 2020-01-01 PROCEDURE — C1894 INTRO/SHEATH, NON-LASER: HCPCS | Performed by: SURGERY

## 2020-01-01 PROCEDURE — 84484 ASSAY OF TROPONIN QUANT: CPT

## 2020-01-01 PROCEDURE — 99284 EMERGENCY DEPT VISIT MOD MDM: CPT

## 2020-01-01 PROCEDURE — 5A09357 ASSISTANCE WITH RESPIRATORY VENTILATION, LESS THAN 24 CONSECUTIVE HOURS, CONTINUOUS POSITIVE AIRWAY PRESSURE: ICD-10-PCS | Performed by: INTERNAL MEDICINE

## 2020-01-01 PROCEDURE — C1769 GUIDE WIRE: HCPCS | Performed by: SURGERY

## 2020-01-01 PROCEDURE — 2709999900 HC NON-CHARGEABLE SUPPLY: Performed by: SURGERY

## 2020-01-01 PROCEDURE — 5A1D70Z PERFORMANCE OF URINARY FILTRATION, INTERMITTENT, LESS THAN 6 HOURS PER DAY: ICD-10-PCS | Performed by: INTERNAL MEDICINE

## 2020-01-01 PROCEDURE — 74011000636 HC RX REV CODE- 636: Performed by: SURGERY

## 2020-01-01 PROCEDURE — 76210000026 HC REC RM PH II 1 TO 1.5 HR: Performed by: SURGERY

## 2020-01-01 PROCEDURE — 76210000063 HC OR PH I REC FIRST 0.5 HR: Performed by: SURGERY

## 2020-01-01 PROCEDURE — 51798 US URINE CAPACITY MEASURE: CPT

## 2020-01-01 RX ORDER — LIDOCAINE HYDROCHLORIDE 10 MG/ML
0.1 INJECTION INFILTRATION; PERINEURAL AS NEEDED
Status: DISCONTINUED | OUTPATIENT
Start: 2020-01-01 | End: 2020-01-01 | Stop reason: HOSPADM

## 2020-01-01 RX ORDER — SODIUM CHLORIDE, SODIUM LACTATE, POTASSIUM CHLORIDE, CALCIUM CHLORIDE 600; 310; 30; 20 MG/100ML; MG/100ML; MG/100ML; MG/100ML
100 INJECTION, SOLUTION INTRAVENOUS CONTINUOUS
Status: DISCONTINUED | OUTPATIENT
Start: 2020-01-01 | End: 2020-01-01 | Stop reason: HOSPADM

## 2020-01-01 RX ORDER — SODIUM CHLORIDE 0.9 % (FLUSH) 0.9 %
5-40 SYRINGE (ML) INJECTION AS NEEDED
Status: DISCONTINUED | OUTPATIENT
Start: 2020-01-01 | End: 2020-01-01 | Stop reason: HOSPADM

## 2020-01-01 RX ORDER — CALCIUM ACETATE 667 MG/1
1334 TABLET ORAL
Status: DISCONTINUED | OUTPATIENT
Start: 2020-01-01 | End: 2020-01-01 | Stop reason: HOSPADM

## 2020-01-01 RX ORDER — OXYCODONE HYDROCHLORIDE 5 MG/1
5 TABLET ORAL
Status: DISCONTINUED | OUTPATIENT
Start: 2020-01-01 | End: 2020-01-01 | Stop reason: HOSPADM

## 2020-01-01 RX ORDER — SODIUM CHLORIDE 0.9 % (FLUSH) 0.9 %
5-40 SYRINGE (ML) INJECTION EVERY 8 HOURS
Status: DISCONTINUED | OUTPATIENT
Start: 2020-01-01 | End: 2020-01-01 | Stop reason: HOSPADM

## 2020-01-01 RX ORDER — LIDOCAINE AND PRILOCAINE 25; 25 MG/G; MG/G
CREAM TOPICAL AS NEEDED
Status: DISCONTINUED | OUTPATIENT
Start: 2020-01-01 | End: 2020-01-01 | Stop reason: HOSPADM

## 2020-01-01 RX ORDER — ROSUVASTATIN CALCIUM 20 MG/1
20 TABLET, COATED ORAL
Status: DISCONTINUED | OUTPATIENT
Start: 2020-01-01 | End: 2020-01-01 | Stop reason: HOSPADM

## 2020-01-01 RX ORDER — PROPOFOL 10 MG/ML
INJECTION, EMULSION INTRAVENOUS
Status: DISCONTINUED | OUTPATIENT
Start: 2020-01-01 | End: 2020-01-01 | Stop reason: HOSPADM

## 2020-01-01 RX ORDER — ALLOPURINOL 100 MG/1
100 TABLET ORAL DAILY
Status: DISCONTINUED | OUTPATIENT
Start: 2020-01-01 | End: 2020-01-01 | Stop reason: HOSPADM

## 2020-01-01 RX ORDER — NALOXONE HYDROCHLORIDE 0.4 MG/ML
0.1 INJECTION, SOLUTION INTRAMUSCULAR; INTRAVENOUS; SUBCUTANEOUS AS NEEDED
Status: DISCONTINUED | OUTPATIENT
Start: 2020-01-01 | End: 2020-01-01 | Stop reason: HOSPADM

## 2020-01-01 RX ORDER — DIPHENHYDRAMINE HYDROCHLORIDE 50 MG/ML
12.5 INJECTION, SOLUTION INTRAMUSCULAR; INTRAVENOUS
Status: DISCONTINUED | OUTPATIENT
Start: 2020-01-01 | End: 2020-01-01 | Stop reason: HOSPADM

## 2020-01-01 RX ORDER — PROPOFOL 10 MG/ML
INJECTION, EMULSION INTRAVENOUS AS NEEDED
Status: DISCONTINUED | OUTPATIENT
Start: 2020-01-01 | End: 2020-01-01 | Stop reason: HOSPADM

## 2020-01-01 RX ORDER — DEXTROSE 50 % IN WATER (D50W) INTRAVENOUS SYRINGE
25-50 AS NEEDED
Status: DISCONTINUED | OUTPATIENT
Start: 2020-01-01 | End: 2020-01-01 | Stop reason: HOSPADM

## 2020-01-01 RX ORDER — DEXTROSE 40 %
15 GEL (GRAM) ORAL AS NEEDED
Status: DISCONTINUED | OUTPATIENT
Start: 2020-01-01 | End: 2020-01-01 | Stop reason: HOSPADM

## 2020-01-01 RX ORDER — ACETAMINOPHEN 500 MG
1000 TABLET ORAL ONCE
Status: COMPLETED | OUTPATIENT
Start: 2020-01-01 | End: 2020-01-01

## 2020-01-01 RX ORDER — PANTOPRAZOLE SODIUM 40 MG/1
40 TABLET, DELAYED RELEASE ORAL
Status: DISCONTINUED | OUTPATIENT
Start: 2020-01-01 | End: 2020-01-01 | Stop reason: HOSPADM

## 2020-01-01 RX ORDER — HYDRALAZINE HYDROCHLORIDE 20 MG/ML
INJECTION INTRAMUSCULAR; INTRAVENOUS AS NEEDED
Status: DISCONTINUED | OUTPATIENT
Start: 2020-01-01 | End: 2020-01-01 | Stop reason: HOSPADM

## 2020-01-01 RX ORDER — POLYETHYLENE GLYCOL 3350 17 G/17G
17 POWDER, FOR SOLUTION ORAL DAILY PRN
Status: DISCONTINUED | OUTPATIENT
Start: 2020-01-01 | End: 2020-01-01 | Stop reason: HOSPADM

## 2020-01-01 RX ORDER — PROTAMINE SULFATE 10 MG/ML
INJECTION, SOLUTION INTRAVENOUS AS NEEDED
Status: DISCONTINUED | OUTPATIENT
Start: 2020-01-01 | End: 2020-01-01 | Stop reason: HOSPADM

## 2020-01-01 RX ORDER — ACETAMINOPHEN 650 MG/1
650 SUPPOSITORY RECTAL
Status: DISCONTINUED | OUTPATIENT
Start: 2020-01-01 | End: 2020-01-01 | Stop reason: HOSPADM

## 2020-01-01 RX ORDER — SODIUM CHLORIDE 9 MG/ML
25 INJECTION, SOLUTION INTRAVENOUS CONTINUOUS
Status: DISCONTINUED | OUTPATIENT
Start: 2020-01-01 | End: 2020-01-01 | Stop reason: HOSPADM

## 2020-01-01 RX ORDER — SODIUM CHLORIDE, SODIUM LACTATE, POTASSIUM CHLORIDE, CALCIUM CHLORIDE 600; 310; 30; 20 MG/100ML; MG/100ML; MG/100ML; MG/100ML
1000 INJECTION, SOLUTION INTRAVENOUS CONTINUOUS
Status: DISCONTINUED | OUTPATIENT
Start: 2020-01-01 | End: 2020-01-01 | Stop reason: HOSPADM

## 2020-01-01 RX ORDER — ONDANSETRON 2 MG/ML
4 INJECTION INTRAMUSCULAR; INTRAVENOUS
Status: DISCONTINUED | OUTPATIENT
Start: 2020-01-01 | End: 2020-01-01 | Stop reason: HOSPADM

## 2020-01-01 RX ORDER — DOCUSATE SODIUM 100 MG/1
100 CAPSULE, LIQUID FILLED ORAL DAILY
Status: DISCONTINUED | OUTPATIENT
Start: 2020-01-01 | End: 2020-01-01 | Stop reason: HOSPADM

## 2020-01-01 RX ORDER — FLUMAZENIL 0.1 MG/ML
0.2 INJECTION INTRAVENOUS
Status: DISCONTINUED | OUTPATIENT
Start: 2020-01-01 | End: 2020-01-01 | Stop reason: HOSPADM

## 2020-01-01 RX ORDER — CEFAZOLIN SODIUM/WATER 2 G/20 ML
2 SYRINGE (ML) INTRAVENOUS ONCE
Status: COMPLETED | OUTPATIENT
Start: 2020-01-01 | End: 2020-01-01

## 2020-01-01 RX ORDER — MIDAZOLAM HYDROCHLORIDE 1 MG/ML
2 INJECTION, SOLUTION INTRAMUSCULAR; INTRAVENOUS
Status: DISCONTINUED | OUTPATIENT
Start: 2020-01-01 | End: 2020-01-01 | Stop reason: HOSPADM

## 2020-01-01 RX ORDER — HYDROMORPHONE HYDROCHLORIDE 2 MG/ML
0.5 INJECTION, SOLUTION INTRAMUSCULAR; INTRAVENOUS; SUBCUTANEOUS
Status: DISCONTINUED | OUTPATIENT
Start: 2020-01-01 | End: 2020-01-01 | Stop reason: HOSPADM

## 2020-01-01 RX ORDER — SODIUM CHLORIDE 9 MG/ML
INJECTION, SOLUTION INTRAVENOUS
Status: DISCONTINUED | OUTPATIENT
Start: 2020-01-01 | End: 2020-01-01 | Stop reason: HOSPADM

## 2020-01-01 RX ORDER — LIDOCAINE HYDROCHLORIDE 20 MG/ML
INJECTION, SOLUTION EPIDURAL; INFILTRATION; INTRACAUDAL; PERINEURAL AS NEEDED
Status: DISCONTINUED | OUTPATIENT
Start: 2020-01-01 | End: 2020-01-01 | Stop reason: HOSPADM

## 2020-01-01 RX ORDER — CLOPIDOGREL BISULFATE 75 MG/1
75 TABLET ORAL DAILY
Status: DISCONTINUED | OUTPATIENT
Start: 2020-01-01 | End: 2020-01-01 | Stop reason: HOSPADM

## 2020-01-01 RX ORDER — LIDOCAINE HYDROCHLORIDE 10 MG/ML
INJECTION INFILTRATION; PERINEURAL AS NEEDED
Status: DISCONTINUED | OUTPATIENT
Start: 2020-01-01 | End: 2020-01-01 | Stop reason: HOSPADM

## 2020-01-01 RX ORDER — HEPARIN SODIUM 5000 [USP'U]/ML
5000 INJECTION, SOLUTION INTRAVENOUS; SUBCUTANEOUS EVERY 8 HOURS
Status: DISCONTINUED | OUTPATIENT
Start: 2020-01-01 | End: 2020-01-01 | Stop reason: HOSPADM

## 2020-01-01 RX ORDER — HEPARIN SODIUM 1000 [USP'U]/ML
INJECTION, SOLUTION INTRAVENOUS; SUBCUTANEOUS AS NEEDED
Status: DISCONTINUED | OUTPATIENT
Start: 2020-01-01 | End: 2020-01-01 | Stop reason: HOSPADM

## 2020-01-01 RX ORDER — GABAPENTIN 100 MG/1
200 CAPSULE ORAL 3 TIMES DAILY
Status: DISCONTINUED | OUTPATIENT
Start: 2020-01-01 | End: 2020-01-01 | Stop reason: HOSPADM

## 2020-01-01 RX ORDER — ACETAMINOPHEN 325 MG/1
650 TABLET ORAL
Status: DISCONTINUED | OUTPATIENT
Start: 2020-01-01 | End: 2020-01-01 | Stop reason: HOSPADM

## 2020-01-01 RX ORDER — ALBUTEROL SULFATE 0.83 MG/ML
2.5 SOLUTION RESPIRATORY (INHALATION) AS NEEDED
Status: DISCONTINUED | OUTPATIENT
Start: 2020-01-01 | End: 2020-01-01 | Stop reason: HOSPADM

## 2020-01-01 RX ORDER — LANOLIN ALCOHOL/MO/W.PET/CERES
1000 CREAM (GRAM) TOPICAL DAILY
Status: DISCONTINUED | OUTPATIENT
Start: 2020-01-01 | End: 2020-01-01 | Stop reason: HOSPADM

## 2020-01-01 RX ORDER — INSULIN GLARGINE 100 [IU]/ML
25 INJECTION, SOLUTION SUBCUTANEOUS
Status: DISCONTINUED | OUTPATIENT
Start: 2020-01-01 | End: 2020-01-01 | Stop reason: HOSPADM

## 2020-01-01 RX ADMIN — HYDRALAZINE HYDROCHLORIDE 5 MG: 20 INJECTION INTRAMUSCULAR; INTRAVENOUS at 10:16

## 2020-01-01 RX ADMIN — PHENYLEPHRINE HYDROCHLORIDE 120 MCG: 10 INJECTION INTRAVENOUS at 11:33

## 2020-01-01 RX ADMIN — Medication 2 G: at 09:26

## 2020-01-01 RX ADMIN — GABAPENTIN 200 MG: 100 CAPSULE ORAL at 21:18

## 2020-01-01 RX ADMIN — GABAPENTIN 200 MG: 100 CAPSULE ORAL at 09:18

## 2020-01-01 RX ADMIN — ACETAMINOPHEN 1000 MG: 500 TABLET, FILM COATED ORAL at 07:32

## 2020-01-01 RX ADMIN — GABAPENTIN 200 MG: 100 CAPSULE ORAL at 21:34

## 2020-01-01 RX ADMIN — PROPOFOL 20 MG: 10 INJECTION, EMULSION INTRAVENOUS at 09:44

## 2020-01-01 RX ADMIN — SODIUM CHLORIDE 25 ML/HR: 9 INJECTION, SOLUTION INTRAVENOUS at 07:57

## 2020-01-01 RX ADMIN — Medication 1334 MG: at 09:18

## 2020-01-01 RX ADMIN — LEVOTHYROXINE SODIUM 200 MCG: 0.12 TABLET ORAL at 05:30

## 2020-01-01 RX ADMIN — HEPARIN SODIUM 5000 UNITS: 5000 INJECTION INTRAVENOUS; SUBCUTANEOUS at 21:19

## 2020-01-01 RX ADMIN — Medication 10 ML: at 21:19

## 2020-01-01 RX ADMIN — PROTAMINE SULFATE 5 MG: 10 INJECTION, SOLUTION INTRAVENOUS at 11:29

## 2020-01-01 RX ADMIN — PANTOPRAZOLE SODIUM 40 MG: 40 TABLET, DELAYED RELEASE ORAL at 05:30

## 2020-01-01 RX ADMIN — CLOPIDOGREL BISULFATE 75 MG: 75 TABLET ORAL at 09:18

## 2020-01-01 RX ADMIN — PROTAMINE SULFATE 5 MG: 10 INJECTION, SOLUTION INTRAVENOUS at 11:26

## 2020-01-01 RX ADMIN — Medication 1334 MG: at 09:20

## 2020-01-01 RX ADMIN — PROPOFOL 50 MCG/KG/MIN: 10 INJECTION, EMULSION INTRAVENOUS at 09:34

## 2020-01-01 RX ADMIN — HYDRALAZINE HYDROCHLORIDE 5 MG: 20 INJECTION INTRAMUSCULAR; INTRAVENOUS at 10:26

## 2020-01-01 RX ADMIN — HEPARIN SODIUM 5000 UNITS: 5000 INJECTION INTRAVENOUS; SUBCUTANEOUS at 21:34

## 2020-01-01 RX ADMIN — HEPARIN SODIUM 3000 UNITS: 1000 INJECTION, SOLUTION INTRAVENOUS; SUBCUTANEOUS at 11:11

## 2020-01-01 RX ADMIN — ALLOPURINOL 100 MG: 100 TABLET ORAL at 09:20

## 2020-01-01 RX ADMIN — Medication 10 ML: at 05:30

## 2020-01-01 RX ADMIN — HEPARIN SODIUM 5000 UNITS: 5000 INJECTION INTRAVENOUS; SUBCUTANEOUS at 04:47

## 2020-01-01 RX ADMIN — ACETAMINOPHEN 650 MG: 325 TABLET, FILM COATED ORAL at 12:22

## 2020-01-01 RX ADMIN — HEPARIN SODIUM 5000 UNITS: 5000 INJECTION INTRAVENOUS; SUBCUTANEOUS at 05:29

## 2020-01-01 RX ADMIN — LEVOTHYROXINE SODIUM 200 MCG: 0.12 TABLET ORAL at 04:47

## 2020-01-01 RX ADMIN — PROPOFOL 40 MG: 10 INJECTION, EMULSION INTRAVENOUS at 09:34

## 2020-01-01 RX ADMIN — PROTAMINE SULFATE 5 MG: 10 INJECTION, SOLUTION INTRAVENOUS at 11:28

## 2020-01-01 RX ADMIN — Medication 1 TABLET: at 09:20

## 2020-01-01 RX ADMIN — SODIUM CHLORIDE: 900 INJECTION, SOLUTION INTRAVENOUS at 10:47

## 2020-01-01 RX ADMIN — ACETAMINOPHEN 650 MG: 325 TABLET, FILM COATED ORAL at 04:45

## 2020-01-01 RX ADMIN — PROPOFOL 50 MG: 10 INJECTION, EMULSION INTRAVENOUS at 10:47

## 2020-01-01 RX ADMIN — PHENYLEPHRINE HYDROCHLORIDE 120 MCG: 10 INJECTION INTRAVENOUS at 11:20

## 2020-01-01 RX ADMIN — CYANOCOBALAMIN TAB 1000 MCG 1000 MCG: 1000 TAB at 09:20

## 2020-01-01 RX ADMIN — Medication 2 G: at 10:48

## 2020-01-01 RX ADMIN — Medication 10 ML: at 21:34

## 2020-01-01 RX ADMIN — ROSUVASTATIN CALCIUM 20 MG: 20 TABLET, COATED ORAL at 21:18

## 2020-01-01 RX ADMIN — ALLOPURINOL 100 MG: 100 TABLET ORAL at 09:18

## 2020-01-01 RX ADMIN — PROPOFOL 75 MCG/KG/MIN: 10 INJECTION, EMULSION INTRAVENOUS at 10:47

## 2020-01-01 RX ADMIN — Medication 1 TABLET: at 09:18

## 2020-01-01 RX ADMIN — HEPARIN SODIUM 5000 UNITS: 1000 INJECTION, SOLUTION INTRAVENOUS; SUBCUTANEOUS at 09:53

## 2020-01-01 RX ADMIN — CYANOCOBALAMIN TAB 1000 MCG 1000 MCG: 1000 TAB at 09:18

## 2020-01-01 RX ADMIN — LIDOCAINE HYDROCHLORIDE 50 MG: 20 INJECTION, SOLUTION EPIDURAL; INFILTRATION; INTRACAUDAL; PERINEURAL at 10:47

## 2020-01-01 RX ADMIN — LIDOCAINE HYDROCHLORIDE 60 MG: 20 INJECTION, SOLUTION EPIDURAL; INFILTRATION; INTRACAUDAL; PERINEURAL at 09:34

## 2020-01-01 RX ADMIN — PHENYLEPHRINE HYDROCHLORIDE 120 MCG: 10 INJECTION INTRAVENOUS at 11:27

## 2020-01-01 RX ADMIN — PANTOPRAZOLE SODIUM 40 MG: 40 TABLET, DELAYED RELEASE ORAL at 04:47

## 2020-01-01 RX ADMIN — PROTAMINE SULFATE 5 MG: 10 INJECTION, SOLUTION INTRAVENOUS at 11:27

## 2020-01-01 RX ADMIN — SODIUM CHLORIDE: 9 INJECTION, SOLUTION INTRAVENOUS at 09:26

## 2020-01-01 RX ADMIN — Medication 1334 MG: at 17:30

## 2020-01-01 RX ADMIN — GABAPENTIN 200 MG: 100 CAPSULE ORAL at 15:23

## 2020-01-01 RX ADMIN — CLOPIDOGREL BISULFATE 75 MG: 75 TABLET ORAL at 09:20

## 2020-01-01 RX ADMIN — ROSUVASTATIN CALCIUM 20 MG: 20 TABLET, COATED ORAL at 21:34

## 2020-01-01 RX ADMIN — Medication 10 ML: at 05:06

## 2020-01-01 RX ADMIN — GABAPENTIN 200 MG: 100 CAPSULE ORAL at 09:20

## 2020-04-30 PROBLEM — I26.99 ACUTE PULMONARY EMBOLISM (HCC): Status: RESOLVED | Noted: 2017-12-06 | Resolved: 2020-01-01

## 2020-07-23 NOTE — BRIEF OP NOTE
[place] Nguyen [id number]  [phone] FAX: [phone]    Brief Op Note Template Note    Pre-Op Diagnosis: Malfunction of arteriovenous dialysis fistula, initial encounter ([facility] [address] Arian Rao    Post-Op Diagnosis:  Malfunction [facility]ovenous dialysis [facility] [T82.590A]    Procedures: Procedure(s):  DECLOT RIGHT FOREARM AV GRAFT    Surgeon: Boby Fischer MD    Assistants: Surgeon(s): Joselo Polo MD      Anesthesia:  General     Findings: No anatomical abnormalities, outflow venous stent patent    Tourniquet Time:  * No tourniquets in log *    Estimated Blood Loss:               Specimens:           Implants:  * No implants in log *    Complications: None               Signed: Boby Fischer MD      Elements of this note have been dictated using speech recognition software. As a result, errors of speech recognition may have occurred.

## 2020-07-23 NOTE — DISCHARGE INSTRUCTIONS
ACTIVITY  · As tolerated and as directed by your doctor. · Keep arm straight and raised above heart level for the next 24 hours. DIET  · Clear liquids until no nausea or vomiting; then light diet for the first day. · Advance to regular diet on second day, unless your doctor orders otherwise. · If nausea and vomiting continues, call your doctor. PAIN  · Take pain medication as directed by your doctor. · Call your doctor if pain is NOT relieved by the medication. DRESSING CARE  · Keep your dressing clean and dry. · Leave the dressing on until the dialysis nurse or your doctor takes it off. CARE OF YOUR ACCESS  DO:  · Keep access area clean with soap and water daily after dressing has been removed. · Feel for the thrill daily. (by placing your fingers over the graft you will be able to feel a vibration (thrill) which means blood is flowing and the graft is working.)    DO NOT:  · Wear tight sleeves, watches, belts or bracelets over graft. · Carry heavy bags across the graft. · Sleep on graft side. · Let your blood pressure be taken on graft side. · Let blood be drawn from your graft side. CALL YOUR DOCTOR IF  · Excessive bleeding that does not stop after holding mild pressure over area. · Temperature of 101 degrees F or above. · Redness, excessive swelling or bruising, and/or green or yellow, smelly discharge from incision   · Loss of sensation-cold, white, or blue fingers or toes. After general anesthesia or intravenous sedation, for 24 hours or while taking prescription Narcotics:  · Limit your activities  · A responsible adult needs to be with you for the next 24 hours  · Do not drive and operate hazardous machinery  · Do not make important personal or business decisions  · Do not drink alcoholic beverages  · If you have not urinated within 8 hours after discharge, please contact your surgeon on call.   · If you have sleep apnea and have a CPAP machine, please use it for all naps and sleeping. · Please use caution when taking narcotics and any of your home medications that may cause drowsiness. *  Please give a list of your current medications to your Primary Care Provider. *  Please update this list whenever your medications are discontinued, doses are      changed, or new medications (including over-the-counter products) are added. *  Please carry medication information at all times in case of emergency situations. These are general instructions for a healthy lifestyle:  No smoking/ No tobacco products/ Avoid exposure to second hand smoke  Surgeon General's Warning:  Quitting smoking now greatly reduces serious risk to your health. Obesity, smoking, and sedentary lifestyle greatly increases your risk for illness  A healthy diet, regular physical exercise & weight monitoring are important for maintaining a healthy lifestyle    You may be retaining fluid if you have a history of heart failure or if you experience any of the following symptoms:  Weight gain of 3 pounds or more overnight or 5 pounds in a week, increased swelling in our hands or feet or shortness of breath while lying flat in bed. Please call your doctor as soon as you notice any of these symptoms; do not wait until your next office visit.

## 2020-07-23 NOTE — ANESTHESIA POSTPROCEDURE EVALUATION
Procedure(s):  DECLOT RIGHT FOREARM AV GRAFT.    total IV anesthesia    Anesthesia Post Evaluation      Multimodal analgesia: multimodal analgesia used between 6 hours prior to anesthesia start to PACU discharge  Patient location during evaluation: bedside  Patient participation: complete - patient participated  Level of consciousness: awake and responsive to light touch  Pain management: adequate  Airway patency: patent  Anesthetic complications: no  Cardiovascular status: acceptable, hemodynamically stable, blood pressure returned to baseline and stable  Respiratory status: acceptable, unassisted, spontaneous ventilation and nonlabored ventilation  Hydration status: acceptable  Post anesthesia nausea and vomiting:  controlled      INITIAL Post-op Vital signs:   Vitals Value Taken Time   /65 7/23/2020 10:52 AM   Temp 36.5 °C (97.7 °F) 7/23/2020 10:38 AM   Pulse 74 7/23/2020 10:56 AM   Resp 14 7/23/2020 10:38 AM   SpO2 93 % 7/23/2020 10:56 AM   Vitals shown include unvalidated device data.

## 2020-07-23 NOTE — H&P
[address]   [address] [facility]. [address] FAX: [phone]         VASCULAR SURGERY FLOOR PROGRESS NOTE    Admit Date: 7/23/2020  POD: * No surgery date entered *    Procedure:  Procedure(s):  DECLOT RIGHT FOREARM AV GRAFT    Subjective:     Patient has no new complaints. Objective:     Vitals: There were no vitals taken for this visit. No data recorded. Intake / Output:  No intake or output data in the 24 hours ending 07/23/20 0703    Physical Exam:    Constitutional: she appears well-developed. No distress. HENT:   Head: Atraumatic. Eyes: Pupils are equal, round, and reactive to light. Neck: Normal range of motion. Cardiovascular: Regular rhythm. Pulmonary/Chest: Effort normal and breath sounds normal. No respiratory distress. Abdominal: Soft. Bowel sounds are normal. she exhibits no distension. There is no tenderness. There is no guarding. No hernia. Musculoskeletal: Normal range of motion. Neurological: She is alert. CN II- XII grossly intact  Vascular: occluded right arm graft    Labs: No results for input(s): HGB, WBC, K, GLU, HGBEXT in the last 72 hours.     No lab exists for component:  CREA    Data Review     Assessment:     Patient Active Problem List    Diagnosis Date Noted    Restrictive lung disease secondary to obesity 02/27/2018    Chronic respiratory failure with hypoxia and hypercapnia ([address] 02/27/2018    Type 2 diabetes mellitus with nephropathy ([address] 12/28/2017    DNR (do not resuscitate) 12/19/2017    Leg wound, right 96/51/1627    Systolic CHF, chronic ([address] 10/18/2017    ESRD (end stage renal disease) on dialysis ([address] 07/20/2017    Orthostatic hypotension     Chronic diastolic heart failure (HCC)     HTN (hypertension), ESSENTIAL     Hyperlipidemia     Coronary atherosclerosis of native coronary vessel     Hemodialysis access, AV graft ([address] 09/09/2014    Unable to bear weight 08/28/2014    ESRD on hemodialysis ([address] 08/28/2014    Weakness of both lower limbs 08/28/2014    Anemia of chronic disease 08/28/2014    History of stroke 08/28/2014    CASSIE (obstructive sleep apnea) 07/16/2014    DM (diabetes mellitus) ([email or web address] 07/16/2014    Hypothyroidism 06/25/2014    GERD (gastroesophageal reflux disease) 06/19/2014    Lupus 06/19/2014    Morbid obesity ([email or web address] 06/19/2014       Plan/Recommendations/Medical Decision Making:   Right arm declot    Elements of this note have been dictated using speech recognition software. As a result, errors of speech recognition may have occurred.

## 2020-07-23 NOTE — ANESTHESIA PREPROCEDURE EVALUATION
Anesthetic History   No history of anesthetic complications            Review of Systems / Medical History  Patient summary reviewed and pertinent labs reviewed    Pulmonary        Sleep apnea (3L QHS): BiPAP           Neuro/Psych       CVA (2014)       Cardiovascular    Hypertension: well controlled      CHF: NYHA Classification II  Dysrhythmias : SVT  CAD    Exercise tolerance: <4 METS  Comments: Echo with EF 35-40% and PFO    2017 echo at time with PE concern with RV pressure overload with septal flattening. Likely with chornic RV strain given comorbidities.    GI/Hepatic/Renal     GERD: well controlled    Renal disease (HD; mWf - last dialyzed wednesday): ESRD and dialysis  Liver disease (hepatomegaly)     Endo/Other    Diabetes: well controlled, type 2, using insulin  Hypothyroidism  Morbid obesity, arthritis and anemia     Other Findings   Comments: Gout  Fibromyalgia  Neuropathy  Hx sepsis  Vertigo since stroke  SLE    H/o lupus    DNR    7/23/20 95% on RA           Physical Exam    Airway  Mallampati: II  TM Distance: 4 - 6 cm  Neck ROM: normal range of motion   Mouth opening: Normal     Cardiovascular  Regular rate and rhythm,  S1 and S2 normal,  no murmur, click, rub, or gallop  Rhythm: regular  Rate: normal      Pertinent negatives: No murmur and peripheral edema   Dental  No notable dental hx       Pulmonary  Breath sounds clear to auscultation               Abdominal  GI exam deferred       Other Findings            Anesthetic Plan    ASA: 4  Anesthesia type: total IV anesthesia          Induction: Intravenous  Anesthetic plan and risks discussed with: Patient

## 2020-07-24 NOTE — OP NOTES
[facility]  OPERATIVE REPORT    Name:  Shameka Bailey  MR#:  [id number]  :  1945  ACCOUNT #:  [de-identified]  DATE OF SERVICE:  2020    CLINICAL SERVICE:  Vascular Surgery. PREOPERATIVE DIAGNOSIS:  Occluded right forearm loop graft. POSTOPERATIVE DIAGNOSIS:  Occluded right forearm loop graft. PROCEDURES PERFORMED:  1. Open thromboembolectomy of right forearm loop graft. 2.  Fistulogram with central venogram.    SURGEON:  Warren Lennon. Ramírez Tyler MD    ASSISTANT:      ANESTHESIA:  Sedation. COMPLICATIONS:      SPECIMENS REMOVED:     IMPLANTS:     ESTIMATED BLOOD LOSS:     OPERATIVE FINDINGS:  There was no evidence of any outflow stenosis, the venous outflow stent was patent. Subclavian vein, axillary vein were all patent. PROCEDURE:  After getting informed consent, the patient was brought to the operating room, anesthesia was then induced. Preop antibiotics were given before skin incision. The patient's right forearm was then prepped and draped in normal sterile fashion. Preop ultrasound confirmed that the lateral limb was venous and the medial limb was arterial.  We made a transverse incision after giving her about 10 mL of local anesthetic over the limb. We dissected down to the subcutaneous tissue where the graft was evaluated. We got control with proximal and distal with Enoch tourniquets. We then heparinized with 5000 units of heparin. An 11-blade was used to do a graftotomy. We confirmed that the graft was occluded. We passed #4 Yuliana catheter multiple times to the venous limb which was lateral and got organized thrombus with backbleeding. We put an 8-Georgian sheath in it and did digital subtraction which showed there was a venous anastomosis stent, looked like a Viabahn which was patent. The axillary and subclavian vein was patent.   We put a catheter and a wire up into the subclavian to do a central shot that showed the IVC,  subclavian, and SVC were all patent. We did not remove it. There was still not adequate amount of backbleeding. We pulled the sheath back and did digital subtraction again. There was some narrowing at the middle portion of the graft around 9 o'clock that we balloon angioplastied with a 6 x 40 balloon. Once we completed, we clamped back the limb with the graft. We passed a Yuliana catheter down the arterial limb which was immediate multiple times, and we got a pulsatile flow back. We then clamped that graft and then repaired our graftotomy with 5-0 Prolene suture, 3-0 for the subcu, and 4-0 for the skin. The patient got Doppler thrill in the graft and then radial artery. The patient was then extubated and returned to the PACU in stable condition.       MD ADRIANO [email or web address]/[email or web address]  D:  07/23/2020 10:34  T:  07/23/2020 21:52  JOB #:  [id number]

## 2020-11-25 NOTE — ANESTHESIA POSTPROCEDURE EVALUATION
Procedure(s): DECLOT RIGHT FOREARM AV GRAFT. total IV anesthesia Anesthesia Post Evaluation Multimodal analgesia: multimodal analgesia used between 6 hours prior to anesthesia start to PACU discharge Patient location during evaluation: bedside Patient participation: complete - patient participated Level of consciousness: awake and alert Pain management: adequate Airway patency: patent Anesthetic complications: no 
Cardiovascular status: acceptable Respiratory status: acceptable Hydration status: acceptable Post anesthesia nausea and vomiting:  controlled Final Post Anesthesia Temperature Assessment:  Normothermia (36.0-37.5 degrees C) INITIAL Post-op Vital signs:  
Vitals Value Taken Time /59 11/25/2020 12:25 PM  
Temp 36.8 °C (98.2 °F) 11/25/2020 12:25 PM  
Pulse 65 11/25/2020 12:25 PM  
Resp 18 11/25/2020 12:25 PM  
SpO2 98 % 11/25/2020 12:25 PM

## 2020-11-25 NOTE — H&P
History and Physical/Surgical Consult   Matthew Harrison    Admit date: (Not on file)    MRN: [id number]     : 1945     Age: 70 y.o.          2020 7:55 AM    Subjective/HPI:   This patient is a 70 y.o.  female being admitted for same day surgery for declot of her R forearm AV graft. Past Medical History:   Diagnosis Date    Acute on chronic respiratory failure ([address] 2017    Anemia of chronic disease     ARF (acute respiratory failure) ([address] 2017    Arthritis     arthritis OA - generalized multiple joints - pt says \"not really any problems\" (16)    Bleeds easily ([address]     takes Plavix for hx of 2 strokes and PE    CHF (congestive heart failure) ([address] 2017    Ef 40 per cardiology note 2020    Chronic diastolic heart failure (HCC)     Chronic kidney disease     Chronic pain     pt denies this    Chronic respiratory failure ([address]     Cirrhosis ([address]     per patient dx 2019    Coagulation disorder ([address]     pernicious anemia chronic - tx with procrit     Coronary atherosclerosis of native coronary vessel     no stents or interventions, denies mi    Current use of long term anticoagulation     Plavix    Diabetic neuropathy ([address]     Dialysis patient ([address]     M/W/F in [facility] as  Renal- access in right arm    Difficulty walking     ambulates with walker    ESRD (end stage renal disease) on dialysis ([address]     [facility] MWF since     Fibromyalgia     GERD (gastroesophageal reflux disease)     managed well with Protonix    Gout     managed well with Allopurinol    Hemodialysis access site with mature fistula ([address] 14    permanent AV site left forearm placed    Hemodialysis access, AV graft ([address] 2014 (Dr Matthew Lundberg) Brachial artery antecubital forearm loop fistula.       Hepatomegaly     not sure why this is here- per pt    History of CVA (cerebrovascular accident)     X2- slight right sided weakness- blind Right eye    History of echocardiogram     12/12/17- Atrial septum: milr right to left shunt induced by valsalva maneuver    History of stroke 8/28/2014    Previous right residual     Hypercholesterolemia     Hypotension 12/16/2017    Hypotension     midodrone b.i.d.    Hypothyroidism approx 2000    controlled with meds     Lupus ([facility]     systemic    Morbid obesity ([address] 06/21/2016    BMI 36.6    NSVT (nonsustained ventricular tachycardia) ([address] 6/19/2014    Orthostatic hypotension     Managed well with     CASSIE (obstructive sleep apnea) 06/21/2016    BIPAP     PE (pulmonary thromboembolism) ([address] 2017    Poor historian 6/21/16    probably related to CVA in 2014    Sepsis ([address] 6/19/2014    UNCLEAR ETIOLOGY      Stroke ([facility]     x 2- last stroke 2016- lost vision R eye    Supplemental oxygen dependent     2LPM via NC     Type 2 diabetes mellitus ([address]     insulin /AVG BS:130 / A1C 5.7 per patient (9/2020)hypo s/s below 80    Unable to bear weight 8/28/2014    Unspecified sleep apnea 11/13    bi-pap    Vertigo as late effect of stroke 6/21/16    Weakness of both lower limbs 8/28/2014      Past Surgical History:   Procedure Laterality Date    COLONOSCOPY N/A 6/23/2016    COLONOSCOPY  BMI 37 performed by Claudetta Nyhan, MD at [address] [place] [address] N/A 8/27/2019    COLONOSCOPY/BMI 35 performed by Khushi Clark MD at [place] [address] HX COLONOSCOPY  2020    HX ERCP      HX GI      insertion of Peritoneal port RLQ of abdomen     HX GI      removal of peritoneal port     HX HYSTERECTOMY  1975    unknown ovaries    HX LAP CHOLECYSTECTOMY  2009    HX VASCULAR ACCESS  2013 and 4    multiple temporaries    HX VASCULAR ACCESS  8/27/14    permanent placement left forearm    HX VASCULAR ACCESS      Subclavian port in R chest wall (active)     VASCULAR SURGERY PROCEDURE UNLIST  8-27-14    L Brachial artery antecubital forearm loop fistula    VASCULAR SURGERY PROCEDURE UNLIST      L UA AV shunt placement (inactive)     VASCULAR SURGERY PROCEDURE UNLIST Left 01/10/2018    fistulogram/venogram    VASCULAR SURGERY PROCEDURE UNLIST Right 02/09/2018    AVG    VASCULAR SURGERY PROCEDURE UNLIST Left 05/01/2018    Ligation of  AV graft    VASCULAR SURGERY PROCEDURE UNLIST Right 07/2020    DECLOT RIGHT FOREARM AV GRAFT (Right Arm      Allergies   Allergen Reactions    Codeine Other (comments)     Pt can not recall reaction    Oxycodone Not Reported This Time     Patient denies allergy to medication      Social History     Tobacco Use    Smoking status: Never Smoker    Smokeless tobacco: Never Used   Substance Use Topics    Alcohol use: No      Social History     Social History Narrative     and lives with her . Disabled, but previously worked as a . Family History   Problem Relation Age of Onset    Heart Disease Mother     Diabetes Mother     Lung Disease Mother     Heart Disease Father     Stroke Father     Diabetes Father    Bunny Files Sister     Diabetes Sister     Heart Disease Sister       Cannot display prior to admission medications because the patient has not been admitted in this contact. No current facility-administered medications for this encounter. Current Outpatient Medications   Medication Sig    calcium acetate,phosphat bind, (PHOSLO) 667 mg cap Take 2 Caps by mouth three (3) times daily (with meals).  folic acid/vit B complex and C (LINDA-WAQAR PO) Take 1 Tab by mouth daily.  fludrocortisone (FLORINEF) 0.1 mg tablet Take 0.1 mg by mouth nightly.  rosuvastatin (CRESTOR) 20 mg tablet Take 1 Tab by mouth nightly.  pantoprazole (Protonix) 40 mg tablet Take 40 mg by mouth every morning.  insulin glargine (LANTUS,BASAGLAR) 100 unit/mL (3 mL) inpn 25 Units by SubCUTAneous route nightly as needed. Takes for BS greater than 130-    docusate sodium (COLACE) 100 mg capsule Take 100 mg by mouth as needed.     phenyleph-shark marcell oil-mo-pet (HEMORRHOID) rectal ointment by PeriANAL route every six (6) hours as needed for Hemorrhoids.  midodrine (PROAMITINE) 10 mg tablet Take 10 mg by mouth two (2) times a day.  levothyroxine (SYNTHROID) 200 mcg tablet Take 200 mcg by mouth Daily (before breakfast).  lidocaine-prilocain-0.9 % NaCl 2.5 % -2.5 % kit by Does Not Apply route. Apply to left inner forearm topically one time a day every MWF for numbing dialysis access.  clopidogrel (PLAVIX) 75 mg tablet Take 75 mg by mouth every morning. Per Parrish Betts at [facility] patient - continue Plavix prior to declot 11/25/20    allopurinol (ZYLOPRIM) 100 mg tablet Take 100 mg by mouth every morning.  gabapentin (NEURONTIN) 100 mg capsule Take 200 mg by mouth three (3) times daily. Indications: neuropathic pain    cyanocobalamin (VITAMIN B-12) 1,000 mcg tablet Take 1,000 mcg by mouth every morning. Objective: There were no vitals filed for this visit. No intake/output data recorded. No intake/output data recorded. Data Review   Labs per anesthesia in preop if needed      Assessment:     ESRD on HD    Plan:   R forearm AV graft declot today in the OR by Dr. Keith Vizcaino. Preop orders in place, risks/benefits/questions to be answered by surgeon prior to procedure. --      LITA Harvey NP

## 2020-11-25 NOTE — ANESTHESIA PREPROCEDURE EVALUATION
Anesthetic History No history of anesthetic complications Review of Systems / Medical History Patient summary reviewed and pertinent labs reviewed Pulmonary Sleep apnea (3L QHS): BiPAP Neuro/Psych  
 
 
CVA (2014) Cardiovascular Hypertension: well controlled CHF: NYHA Classification II Dysrhythmias : SVT 
CAD Exercise tolerance: <4 METS Comments: Echo with EF 35-40% and PFO 
 
2017 echo at time with PE concern with RV pressure overload with septal flattening. Likely with chornic RV strain given comorbidities. GI/Hepatic/Renal 
  
GERD: well controlled Renal disease (HD; mWf - last dialyzed wednesday): ESRD and dialysis Liver disease (hepatomegaly) Endo/Other Diabetes: well controlled, type 2, using insulin Hypothyroidism Morbid obesity, arthritis and anemia Other Findings Comments: Gout Fibromyalgia Neuropathy Hx sepsis Vertigo since stroke SLE 
 
H/o lupus DNR 
 
7/23/20 95% on RA Physical Exam 
 
Airway Mallampati: III 
TM Distance: < 4 cm Neck ROM: normal range of motion Mouth opening: Normal 
 
Comments: Large neck Cardiovascular Regular rate and rhythm,  S1 and S2 normal,  no murmur, click, rub, or gallop Rhythm: regular Rate: normal 
 
 
Pertinent negatives: No murmur and peripheral edema Dental 
No notable dental hx Pulmonary Breath sounds clear to auscultation Abdominal 
GI exam deferred Other Findings Anesthetic Plan ASA: 4 Anesthesia type: total IV anesthesia Induction: Intravenous Anesthetic plan and risks discussed with: Patient and Family She reports she tolerated and was happy with TIVA earlier this year for same procedure

## 2020-11-25 NOTE — BRIEF OP NOTE
Brief Postoperative Note    Patient: Liu Davalos  YOB: 1945  MRN: [id number]    Date of Procedure: 11/25/2020     Pre-Op Diagnosis: Thrombosis of kidney dialysis arter[facility]ovenous graft, [facility] [P53.694Y]    Post-Op Diagnosis: Same as preoperative diagnosis.       Procedure(s):  DECLOT RIGHT FOREARM AV GRAFT    Surgeon(s):  Simone Pepe MD    Surgical Assistant: None    Anesthesia: General     Estimated Blood Loss (mL): Minimal    Complications: None    Specimens: * No specimens in log *     Implants: * No implants in log *    Drains: * No LDAs found *    Findings:     Electronically Signed by Richard Birmingham MD on 11/25/2020 at 11:56 AM

## 2020-11-25 NOTE — DISCHARGE INSTRUCTIONS
ACTIVITY  · As tolerated and as directed by your doctor. · Keep arm straight and raised above heart level for the next 24 hours. DIET  · Clear liquids until no nausea or vomiting; then light diet for the first day. · Advance to regular diet on second day, unless your doctor orders otherwise. · If nausea and vomiting continues, call your doctor. PAIN  · Take pain medication as directed by your doctor. · Call your doctor if pain is NOT relieved by the medication. · DO NOT take aspirin or blood thinners until directed by your doctor. DRESSING CARE  · Keep your dressing clean and dry  · Leave the dressing on until your doctor tells you to remove it, or your dialysis nurse takes it off if you have an upcoming dialysis appointment. CARE OF YOUR ACCESS  DO:  · Keep access area clean with soap and water daily after dressing has been removed. · Feel for the thrill daily. (by placing your fingers over the graft you will be able to feel a vibration (thrill) which means blood is flowing and the graft is working.)  DO NOT:  · Wear tight sleeves, watches, or bracelets over graft. · Carry heavy bags across the graft. · Sleep on graft side. · Let your blood pressure be taken on graft side. · Let blood be drawn from your graft side. CALL YOUR DOCTOR IF  · Excessive bleeding that does not stop after holding mild pressure over area. · Temperature of 101 degrees F or above. · Redness, excessive swelling or bruising, and/or green or yellow, smelly discharge from incision   · Loss of sensation-cold, white, or blue fingers or toes.     After general anesthesia or intravenous sedation, for 24 hours or while taking prescription Narcotics:  · Limit your activities  · A responsible adult needs to be with you for the next 24 hours  · Do not drive and operate hazardous machinery  · Do not make important personal or business decisions  · Do not drink alcoholic beverages  · If you have not urinated within 8 hours after discharge, and you are experiencing discomfort from urinary retention, please go to the nearest ED. · If you have sleep apnea and have a CPAP machine, please use it for all naps and sleeping. · Please use caution when taking narcotics and any of your home medications that may cause drowsiness. *  Please give a list of your current medications to your Primary Care Provider. *  Please update this list whenever your medications are discontinued, doses are      changed, or new medications (including over-the-counter products) are added. *  Please carry medication information at all times in case of emergency situations. These are general instructions for a healthy lifestyle:  No smoking/ No tobacco products/ Avoid exposure to second hand smoke  Surgeon General's Warning:  Quitting smoking now greatly reduces serious risk to your health. Obesity, smoking, and sedentary lifestyle greatly increases your risk for illness  A healthy diet, regular physical exercise & weight monitoring are important for maintaining a healthy lifestyle    You may be retaining fluid if you have a history of heart failure or if you experience any of the following symptoms:  Weight gain of 3 pounds or more overnight or 5 pounds in a week, increased swelling in our hands or feet or shortness of breath while lying flat in bed. Please call your doctor as soon as you notice any of these symptoms; do not wait until your next office visit.

## 2020-11-25 NOTE — OP NOTES
[address] [facility] 
OPERATIVE REPORT Name:  Jesse Gil 
MR#:  [id number] :  1945 ACCOUNT #:  [de-identified] DATE OF SERVICE:  2020 PREOPERATIVE DIAGNOSIS:  Thrombosed right arteriovenous graft. POSTOPERATIVE DIAGNOSIS:  Thrombosed right arteriovenous graft. PROCEDURES PERFORMED:  Thrombectomy, fistulogram and central venogram, right AV graft. SURGEON:  Narda Jeff MD 
 
ASSISTANT:  None. ANESTHESIA:  IV sedation with 1% lidocaine as local. 
 
COMPLICATIONS:  None. SPECIMENS REMOVED:  None. IMPLANTS:  None. ESTIMATED BLOOD LOSS:  Less than 50 mL. DRAINS:  None. PROCEDURE:  The patient was brought into the operating room and placed on the operating room table in supine position. Following adequate IV sedation and time-out procedure, the right arm was draped and prepped in sterile fashion. A small transverse incision was made over the lateral limb of the graft which was a venous limb. The wound was deepened using Bovie to control bleeding. The AV graft was identified at this level, mobilized, and encircled with a vessel loop. The patient was heparinized. Next, a small transverse graftotomy was performed. A #4 Yuliana catheter was then used to thrombectomize the venous outflow of the graft. Once there was no further thrombus returned and there was good backbleeding, an 8-Turkish sheath was placed. A venous outflow fistulogram as well as central venogram was performed. No evidence of mechanical stenosis was identified. The arterial limb of the graft was thrombectomized through the same graftotomy with return of fibrin cap and pulsatile inflow. At this point, the transverse graftotomy was reapproximated with a running 5-0 Prolene sutures and flow was restored. At this point, there was a good flow in the graft. Hemostasis was achieved and protamine was administered to reverse the heparin effect.   The wound was then irrigated and closed in layers with Vicryl suture. Sterile dry dressings were applied. The patient was awakened from anesthesia and transported to the recovery room in stable condition. The patient tolerated the procedure well. No complications. All needle and sponge counts were correct. MD EVAN [email or web address] 
D:  11/25/2020 12:00 
T:  11/25/2020 16:08 JOB #:  [id number]

## 2020-12-02 PROBLEM — E87.70 FLUID OVERLOAD: Status: ACTIVE | Noted: 2020-01-01

## 2020-12-02 NOTE — ED PROVIDER NOTES
68-year-old female presents to the ER with complaints of generalized weakness and left-sided chest pain    Patient has history of end-stage renal disease and dialyzes Monday Wednesday and Friday. She dialyzes at [address] renal in [facility]  Last dialysis run Friday. Patient states that she was too weak on Monday to go to her treatment and she felt \"too bad\" today to go to her dialysis treatment today    No fever vomiting or diarrhea  Patient is anuric    Patient describes left-sided chest pain on and off for several days. Patient reports a fairly constant dull pain with episodes of more sharp stabbing pain  Denies radiation of the pain  No overt shortness of breath    Patient underwent dialysis graft declot last Wednesday. Reports having a normal dialysis run on Friday        The history is provided by the patient and the spouse. Fatigue   This is a recurrent problem. The current episode started more than 2 days ago. The problem has been gradually worsening. There was no focality noted. Primary symptoms include loss of sensation and movement disorder. Pertinent negatives include no focal weakness, no slurred speech, no speech difficulty, no memory loss and no mental status change. There has been no fever. Associated symptoms include chest pain and nausea. Pertinent negatives include no shortness of breath, no vomiting, no confusion and no headaches. Associated medical issues include CVA. Associated medical issues do not include trauma or seizures.         Past Medical History:   Diagnosis Date    Acute on chronic respiratory failure ([address] 11/16/2017    Anemia of chronic disease     ARF (acute respiratory failure) ([address] 12/19/2017    Arthritis     arthritis OA - generalized multiple joints - pt says \"not really any problems\" (6/21/16)    Bleeds easily ([address]     takes Plavix for hx of 2 strokes and PE    CHF (congestive heart failure) ([address] 11/16/2017    Ef 40 per cardiology note 9/2020    Chronic diastolic heart failure (HCC)     Chronic kidney disease     Chronic pain     pt denies this    Chronic respiratory failure (HCC)     Cirrhosis ([address]     per patient dx 2019    Coagulation disorder ([address]     pernicious anemia chronic - tx with procrit     Coronary atherosclerosis of native coronary vessel     no stents or interventions, denies mi    Current use of long term anticoagulation     Plavix    Diabetic neuropathy ([address]     Dialysis patient ([address]     M/W/F in [facility] Ren[facility]- access in right arm    Difficulty walking     ambulates with walker    ESRD (end stage renal disease) on dialysis ([address]     [facility] MWF since 2014    Fibromyalgia     GERD (gastroesophageal reflux disease)     managed well with Protonix    Gout     managed well with Allopurinol    Hemodialysis access site with mature fistula ([address] 8/27/14    permanent AV site left forearm placed    Hemodialysis access, AV graft ([address] 9/9/2014 8/27/14 (Dr Genet Grider) Brachial artery antecubital forearm loop fistula.       Hepatomegaly     not sure why this is here- per pt    History of CVA (cerebrovascular accident)     X2- slight right sided weakness- blind Right eye    History of echocardiogram     12/12/17- Atrial septum: milr right to left shunt induced by valsalva maneuver    History of stroke 8/28/2014    Previous right residual     Hypercholesterolemia     Hypotension 12/16/2017    Hypotension     midodrone b.i.d.    Hypothyroidism approx 2000    controlled with meds     Lupus ([address]     systemic    Morbid obesity ([address] 06/21/2016    BMI 36.6    NSVT (nonsustained ventricular tachycardia) ([address] 6/19/2014    Orthostatic hypotension     Managed well with     CASSIE (obstructive sleep apnea) 06/21/2016    BIPAP     PE (pulmonary thromboembolism) ([address] 2017    Poor historian 6/21/16    probably related to CVA in 2014    Sepsis ([address] 6/19/2014    UNCLEAR ETIOLOGY      Stroke ([address]     x 2- last stroke 2016- lost vision R eye    Supplemental oxygen dependent     2LPM via [place]     Type 2 diabetes mellitus ([facility]     insulin /AVG BS:130 / A1C 5.7 per patient (9/2020)hypo s/s below 80    Unable to bear weight 8/28/2014    Unspecified sleep apnea 11/13    bi-pap    Vertigo as late effect of stroke 6/21/16    Weakness of both lower limbs 8/28/2014       Past Surgical History:   Procedure Laterality Date    COLONOSCOPY N/A 6/23/2016    COLONOSCOPY  BMI 37 performed by Malorie Anderson MD at [address] [place] [address] N/A 8/27/2019    COLONOSCOPY/BMI 35 performed by Niels Cobian MD at [facility] ENDOSCOPY    HX COLONOSCOPY  2020    HX ERCP      HX GI      insertion of Peritoneal port RLQ of abdomen     HX GI      removal of peritoneal port     HX HYSTERECTOMY  1975    unknown ovaries    HX LAP CHOLECYSTECTOMY  2009    HX VASCULAR ACCESS  2013 and 4    multiple temporaries    HX VASCULAR ACCESS  8/27/14    permanent placement left forearm    HX VASCULAR ACCESS      Subclavian port in R chest wall (active)     VASCULAR SURGERY PROCEDURE UNLIST  8-27-14    L Brachial artery antecubital forearm loop fistula    VASCULAR SURGERY PROCEDURE UNLIST      L UA AV shunt placement (inactive)     VASCULAR SURGERY PROCEDURE UNLIST Left 01/10/2018    fistulogram/venogram    VASCULAR SURGERY PROCEDURE UNLIST Right 02/09/2018    AVG    VASCULAR SURGERY PROCEDURE UNLIST Left 05/01/2018    Ligation of  AV graft    VASCULAR SURGERY PROCEDURE UNLIST Right 07/2020    DECLOT RIGHT FOREARM AV GRAFT (Right Arm         Family History:   Problem Relation Age of Onset    Heart Disease Mother     Diabetes Mother     Lung Disease Mother     Heart Disease Father     Stroke Father     Diabetes Father     Cancer Sister     Diabetes Sister     Heart Disease Sister        Social History     Socioeconomic History    Marital status:      Spouse name: Not on file    Number of children: Not on file    Years of education: Not on file   [facility] Highest education level: Not on file   Occupational History    Not on file   Social Needs    Financial resource strain: Not on file    Food insecurity     Worry: Not on file     Inability: Not on file    Transportation needs     Medical: Not on file     Non-medical: Not on file   Tobacco Use    Smoking status: Never Smoker    Smokeless tobacco: Never Used   Substance and Sexual Activity    Alcohol use: No    Drug use: No    Sexual activity: Never   Lifestyle    Physical activity     Days per week: Not on file     Minutes per session: Not on file    Stress: Not on file   Relationships    Social connections     Talks on phone: Not on file     Gets together: Not on file     Attends Mandaeism service: Not on file     Active member of club or organization: Not on file     Attends meetings of clubs or organizations: Not on file     Relationship status: Not on file    Intimate partner violence     Fear of current or ex partner: Not on file     Emotionally abused: Not on file     Physically abused: Not on file     Forced sexual activity: Not on file   Other Topics Concern    Not on file   Social History Narrative     and lives with her . Disabled, but previously worked as a . ALLERGIES: Codeine and Oxycodone    Review of Systems   Constitutional: Positive for activity change, appetite change and fatigue. Negative for chills and fever. HENT: Negative for congestion, ear pain and rhinorrhea. Eyes: Negative for photophobia and discharge. Respiratory: Negative for cough and shortness of breath. Cardiovascular: Positive for chest pain. Negative for palpitations. Gastrointestinal: Positive for nausea. Negative for abdominal pain, constipation, diarrhea and vomiting. Endocrine: Negative for cold intolerance and heat intolerance. Genitourinary: Negative for dysuria and flank pain. Musculoskeletal: Negative for arthralgias, myalgias and neck pain.    Skin: Negative for rash and wound. Allergic/Immunologic: Negative for environmental allergies and food allergies. Neurological: Negative for focal weakness, syncope, speech difficulty and headaches. Hematological: Negative for adenopathy. Does not bruise/bleed easily. Psychiatric/Behavioral: Positive for dysphoric mood. Negative for confusion and memory loss. The patient is not nervous/anxious. All other systems reviewed and are negative. Vitals:    12/02/20 1000 12/02/20 1049   BP: (!) 196/144    Pulse: 95    Resp: 16    Temp: 97.8 °F (36.6 °C)    SpO2: (!) 88% 96%   Weight: 90.7 kg (200 lb)    Height: 5' 3\" (1.6 m)             Physical Exam  Vitals signs and nursing note reviewed. Constitutional:       General: She is in acute distress. Appearance: Normal appearance. She is well-developed. She is obese. She is ill-appearing. HENT:      Head: Normocephalic and atraumatic. Right Ear: External ear normal.      Left Ear: External ear normal.      Mouth/Throat:      Pharynx: No oropharyngeal exudate. Eyes:      Extraocular Movements: Extraocular movements intact. Conjunctiva/sclera: Conjunctivae normal.      Pupils: Pupils are equal, round, and reactive to light. Neck:      Musculoskeletal: Normal range of motion and neck supple. Vascular: No JVD. Cardiovascular:      Rate and Rhythm: Normal rate and regular rhythm. Pulses: Normal pulses. Heart sounds: Normal heart sounds. No murmur. No friction rub. No gallop. Pulmonary:      Effort: Pulmonary effort is normal.      Breath sounds: Normal breath sounds. Chest:       Abdominal:      General: Bowel sounds are normal. There is no distension. Palpations: Abdomen is soft. There is no mass. Tenderness: There is no abdominal tenderness. Musculoskeletal: Normal range of motion. General: No deformity. Skin:     General: Skin is warm. Capillary Refill: Capillary refill takes less than 2 seconds. Findings: No rash. Neurological:      General: No focal deficit present. Mental Status: She is alert and oriented to person, place, and time. Cranial Nerves: No cranial nerve deficit. Sensory: No sensory deficit. Gait: Gait normal.   Psychiatric:         Mood and Affect: Affect is blunt and flat. Speech: Speech is delayed. Behavior: Behavior normal. Behavior is cooperative. Cognition and Memory: She exhibits impaired recent memory. Judgment: Judgment normal.          MDM  Number of Diagnoses or Management Options  Acute hyperkalemia: new and requires workup  Chronic respiratory failure with hypoxia ([facility]): established and worsening  Hypervolemia, unspecified hypervolemia type: new and requires workup  Malaise and fatigue: new and requires workup  Uremia of renal origin: new and requires workup  Diagnosis management comments: EKG reviewed  Sinus rhythm  Interventricular conduction delay  No ectopy  No acute ischemic changes    Case reviewed with Dr. Ariana Brandon  Will dialyze the patient this afternoon and likely again tomorrow  Asked hospitalist be consulted for admission       Amount and/or Complexity of Data Reviewed  Clinical lab tests: ordered and reviewed  Tests in the radiology section of CPT®: ordered and reviewed  Tests in the medicine section of CPT®: ordered and reviewed  Decide to obtain previous medical records or to obtain history from someone other than the patient: yes  Review and summarize past medical records: yes  Discuss the patient with other providers: yes  Independent visualization of images, tracings, or specimens: yes    Risk of Complications, Morbidity, and/or Mortality  Presenting problems: high  Diagnostic procedures: moderate  Management options: moderate  General comments: Elements of this note have been dictated via voice recognition software. Text and phrases may be limited by the accuracy of the software.   The chart has been reviewed, but errors may still be present.       Patient Progress  Patient progress: stable         Procedures

## 2020-12-02 NOTE — DIALYSIS
TRANSFER OUT -DIALYSIS    Hemodialysis treatment completed without complications. Patient alert and VS stable  /71  P 70       2 Kgs removed. Needles X2 removed from access and manual pressure held until hemostasis complete and pressure dressing applied. Meds given None. No units of RBCs given during dialysis. Patient to 604 after dialysis. Report called to Juan C Vargas.

## 2020-12-02 NOTE — CONSULTS
[facility] CONSULT NOTE    Admission Date:  12/2/2020    Admission Diagnosis:  Fluid overload [E87.70]    Consulting physician:  Holland  Reason for consult:  ESRD management    Subjective:   History of Present Illness:     Ms. Sally Rodríguez is a 69 yo F with a PMH of ESRD on MWF dialysis at our clinic in [facility]. Last HD was Friday. Missed Monday because she felt poorly and presented to the ED today with complaint of fatigue and weakness. Noted to have pulmonary edema on CXR and mild hyperkalemia. Nephrology consulted to evaluate for dialysis.       Past Medical History:   Diagnosis Date    Acute on chronic respiratory failure ([address] 11/16/2017    Anemia of chronic disease     ARF (acute respiratory failure) ([address] 12/19/2017    Arthritis     arthritis OA - generalized multiple joints - pt says \"not really any problems\" (6/21/16)    Bleeds easily ([address]     takes Plavix for hx of 2 strokes and PE    CHF (congestive heart failure) ([address] 11/16/2017    Ef 40 per cardiology note 9/2020    Chronic diastolic heart failure (HCC)     Chronic kidney disease     Chronic pain     pt denies this    Chronic respiratory failure ([address]     Cirrhosis ([address]     per patient dx 2019    Coagulation disorder ([address]     pernicious anemia chronic - tx with procrit     Coronary atherosclerosis of native coronary vessel     no stents or interventions, denies mi    Current use of long term anticoagulation     Plavix    Diabetic neuropathy ([address]     Dialysis patient ([address]     M/W/F in [facility] as  Renal- access in right arm    Difficulty walking     ambulates with walker    ESRD (end stage renal disease) on dialysis ([address]     [facility] MWF since 2014    Fibromyalgia     GERD (gastroesophageal reflux disease)     managed well with Protonix    Gout     managed well with Allopurinol    Hemodialysis access site with mature fistula ([address] 8/27/14    permanent AV site left forearm placed    Hemodialysis access, AV graft ([address] 9/9/2014 8/27/14 (Dr Marilee Sterling) Brachial artery antecubital forearm loop fistula.       Hepatomegaly     not sure why this is here- per pt    History of CVA (cerebrovascular accident)     X2- slight right sided weakness- blind Right eye    History of echocardiogram     12/12/17- Atrial septum: milr right to left shunt induced by valsalva maneuver    History of stroke 8/28/2014    Previous right residual     Hypercholesterolemia     Hypotension 12/16/2017    Hypotension     midodrone b.i.d.    Hypothyroidism approx 2000    controlled with meds     Lupus ([address]     systemic    Morbid obesity ([address] 06/21/2016    BMI 36.6    NSVT (nonsustained ventricular tachycardia) ([address] 6/19/2014    Orthostatic hypotension     Managed well with     CASSIE (obstructive sleep apnea) 06/21/2016    BIPAP     PE (pulmonary thromboembolism) ([address] 2017    Poor historian 6/21/16    probably related to CVA in 2014    Sepsis ([address] 6/19/2014    UNCLEAR ETIOLOGY      Stroke ([address]     x 2- last stroke 2016- lost vision R eye    Supplemental oxygen dependent     2LPM via NC     Type 2 diabetes mellitus ([address]     insulin /AVG BS:130 / A1C 5.7 per patient (9/2020)hypo s/s below 80    Unable to bear weight 8/28/2014    Unspecified sleep apnea 11/13    bi-pap    Vertigo as late effect of stroke 6/21/16    Weakness of both lower limbs 8/28/2014      Past Surgical History:   Procedure Laterality Date    COLONOSCOPY N/A 6/23/2016    COLONOSCOPY  BMI 37 performed by Pippa Connolly MD at [facility] N/A 8/27/2019    COLONOSCOPY/BMI 35 performed by Abhishek Carmona MD at [address] [facility] HX COLONOSCOPY  2020    HX ERCP      HX GI      insertion of Peritoneal port RLQ of abdomen     HX GI      removal of peritoneal port     HX HYSTERECTOMY  1975    unknown ovaries    HX LAP CHOLECYSTECTOMY  2009    HX VASCULAR ACCESS  2013 and 4    multiple temporaries    HX VASCULAR ACCESS  8/27/14    permanent placement left forearm    HX VASCULAR ACCESS      Subclavian port in R chest wall (active)     VASCULAR SURGERY PROCEDURE UNLIST  8-27-14    L Brachial artery antecubital forearm loop fistula    VASCULAR SURGERY PROCEDURE UNLIST      L UA AV shunt placement (inactive)     VASCULAR SURGERY PROCEDURE UNLIST Left 01/10/2018    fistulogram/venogram    VASCULAR SURGERY PROCEDURE UNLIST Right 02/09/2018    AVG    VASCULAR SURGERY PROCEDURE UNLIST Left 05/01/2018    Ligation of  AV graft    VASCULAR SURGERY PROCEDURE UNLIST Right 07/2020    DECLOT RIGHT FOREARM AV GRAFT (Right Arm      No current facility-administered medications for this encounter. Current Outpatient Medications   Medication Sig    calcium acetate,phosphat bind, (PHOSLO) 667 mg cap Take 2 Caps by mouth three (3) times daily (with meals).  folic acid/vit B complex and C (LINDA-WAQAR PO) Take 1 Tab by mouth daily.  fludrocortisone (FLORINEF) 0.1 mg tablet Take 0.1 mg by mouth nightly.  rosuvastatin (CRESTOR) 20 mg tablet Take 1 Tab by mouth nightly.  pantoprazole (Protonix) 40 mg tablet Take 40 mg by mouth every morning.  insulin glargine (LANTUS,BASAGLAR) 100 unit/mL (3 mL) inpn 25 Units by SubCUTAneous route nightly as needed. Takes for BS greater than 130-    docusate sodium (COLACE) 100 mg capsule Take 100 mg by mouth as needed.  phenyleph-shark marcell oil-mo-pet (HEMORRHOID) rectal ointment by PeriANAL route every six (6) hours as needed for Hemorrhoids.  midodrine (PROAMITINE) 10 mg tablet Take 10 mg by mouth two (2) times a day.  levothyroxine (SYNTHROID) 200 mcg tablet Take 200 mcg by mouth Daily (before breakfast).  lidocaine-prilocain-0.9 % NaCl 2.5 % -2.5 % kit by Does Not Apply route. Apply to left inner forearm topically one time a day every MWF for numbing dialysis access.  clopidogrel (PLAVIX) 75 mg tablet Take 75 mg by mouth every morning.  Per Deanna Cerda at [facility] patient - continue Plavix prior to declot 11/25/20    allopurinol (ZYLOPRIM) 100 mg tablet Take 100 mg by mouth every morning.  gabapentin (NEURONTIN) 100 mg capsule Take 200 mg by mouth three (3) times daily. Indications: neuropathic pain    cyanocobalamin (VITAMIN B-12) 1,000 mcg tablet Take 1,000 mcg by mouth every morning. Allergies   Allergen Reactions    Codeine Other (comments)     Pt can not recall reaction    Oxycodone Not Reported This Time     Patient denies allergy to medication      Social History     Tobacco Use    Smoking status: Never Smoker    Smokeless tobacco: Never Used   Substance Use Topics    Alcohol use: No      Family History   Problem Relation Age of Onset    Heart Disease Mother     Diabetes Mother     Lung Disease Mother     Heart Disease Father     Stroke Father     Diabetes Father     Cancer Sister     Diabetes Sister     Heart Disease Sister         Review of Systems- 12pt ROS otherwise negative    Objective:   Vitals:    12/02/20 1316 12/02/20 1416 12/02/20 1427 12/02/20 1458   BP:  (!) 194/81 (!) 174/79 (!) 177/88   Pulse: 76  73 77   Resp:       Temp:       SpO2: 100% 97% 97%    Weight:       Height:         No intake or output data in the 24 hours ending 12/02/20 1540    Physical Exam  GEN :in no distress, alert and oriented  HEENT: anicteric sclerae, eomi. Oropharynx without lesions. Neck - supple without JVD, no thyromegaly. No lymphadenopathy. CV - regular rate and rhythm, no murmur, no rub  Lung - clear bilaterally, lungs expand symmetrically  Chest wall - normal appearance  Abd - soft, nontender, bowel sounds present, no hepatosplenomegaly  Ext - no clubbing, no cyanosis, 1+ edema  Neurologic - nonfocal  Skin - no rashes, no purpura, no ecchymoses  Psychiatric: Normal mood and affect.   Access- RUE AVG cannulated       Data Review:   Recent Labs     12/02/20  1008   WBC 11.5*   HGB 9.7*   HCT 31.9*   *     Recent Labs     12/02/20  1153 12/02/20  1008   NA  --  135*   K 6.1* 6.1*   CL --  99   CO2  --  23   BUN  --  92*   CREA  --  13.00*   GLU  --  102*   CA  --  9.0   MG  --  2.4   PHOS  --  6.7*     No results for input(s): PH, PCO2, PO2, PCO2 in the last 72 hours. Problem List:     Patient Active Problem List    Diagnosis Date Noted    Fluid overload 12/02/2020    Restrictive lung disease secondary to obesity 02/27/2018    Chronic respiratory failure with hypoxia and hypercapnia (HCC) 02/27/2018    Type 2 diabetes mellitus with nephropathy ([address] 12/28/2017    DNR (do not resuscitate) 12/19/2017    Leg wound, right 18/74/3575    Systolic CHF, chronic ([address] 10/18/2017    ESRD (end stage renal disease) on dialysis ([address] 07/20/2017    Orthostatic hypotension     Chronic diastolic heart failure ([address]     HTN (hypertension), ESSENTIAL     Hyperlipidemia     Coronary atherosclerosis of native coronary vessel     Hemodialysis access, AV graft [facility] 09/09/2014    Unable to bear weight 08/28/2014    ESRD on hemodialysis ([address] 08/28/2014    Weakness of both lower limbs 08/28/2014    Anemia of chronic disease 08/28/2014    History of stroke 08/28/2014    CASSIE (obstructive sleep apnea) 07/16/2014    DM (diabetes mellitus) ([address] 07/16/2014    Hypothyroidism 06/25/2014    GERD (gastroesophageal reflux disease) 06/19/2014    Lupus 06/19/2014    Morbid obesity ([address] 06/19/2014       Assessment and Plan:    1) ESRD- Last HD was Friday. Seen on HD today and will plan on dialysis again tomorrow morning. 2) Volume- noted lower extremity edema as well as pulmonary edema. UF with HD today/tomorrow. 3) Hyperkalemia-  K 6.1. Will address with HD. 4) Hyperphos- resume home binders    5) Anemia-  RODRIGO per outpatient clinic.         Piper Collier MD

## 2020-12-02 NOTE — DIALYSIS
TRANSFER IN - DIALYSIS    Received patient in dialysis unit from the ED for ordered procedure. Consent verified for renal replacement therapy. Patient alert and blood pressure elevated. /88 P 77    Hemodialysis initiated using RUE AVG and  15 g needles. Machine settings per MD order. No heparin this treatment. Will monitor during treatment.

## 2020-12-02 NOTE — ED NOTES
Pt returned to ED from rad dept at this time, cardiac monitor reapplied, sinus marissa noted, VS cycling. Pt denies further needs at this time, resp even and unlabored.

## 2020-12-02 NOTE — ED TRIAGE NOTES
Pt to triage in wheelchair with . Pt complaining of chest pain, denies n/v/d. Pt complaining of weakness x 2 days, chronically wearing 2L o2. Pt poor historian. Pt states constant difficulty urinating.

## 2020-12-02 NOTE — H&P
History & Physcial   NAME:  Tika Torres   Age:  70 y.o.  :   1945   MRN:   [id number]  PCP: Radha Gr MD  Treatment Team: Attending Provider: Silvia Amos MD; Nurse Practitioner: Cristopher Nathan NP; Primary Nurse: Tessie Cooley  HPI:   Ms. Ele Durham is a 71 yo female with PMH of acute on chronic resp failure, anemia, CVA, PE, CHF, ESRD on HD, cirrhosis, DM II, CASSIE in [place] who presented with c/o generalized weakness. Missed 2 sessions of HD due to \"feeling bad\". Dialyzes MWF in [facility]. Last session . She was found to have hyperkalemia, fluid overload. Nephrology consulted. Pt taken to HD from ER. Also c/o chest pain left chest, non radiating, reproducible on palpation of left chest.   She is drowsy but oriented X3. She is anuric. Denies SOB, cough, fever, chills, sick contact, loss of taste or smell, n/v/d. Results summary of Diagnostic Studies/Procedures copied from within [facility] EMR:     CXR Results  (Last 48 hours)               20 1350  XR CHEST PA LAT Final result    Impression:  Impression:         1. Cardiomegaly with continued diffuse mild interstitial vascular congestion. CPT code(s) 11444                       Narrative:  Clinical History: The Female patient is 70years old  presenting with symptoms   of Chest Pain. Comparison:  Chest x-ray 2020       Findings:  Frontal and lateral views of the chest were obtained. Diffuse mild interstitial vascular congestion is demonstrated. No pleural   effusions are seen. The cardiomediastinal silhouette remains enlarged. There   are no acute osseous abnormalities. 20 1042  XR CHEST PORT Final result    Impression:  IMPRESSION:  Suboptimal exam due to body habitus and positioning. Infiltrate or   atelectasis left base cannot be excluded. .                   Narrative:  CHEST X-RAY, one view. HISTORY:  Chest pain and weakness.          TECHNIQUE:  AP portable semiupright view. COMPARISON: December 2017       FINDINGS:    Lungs: Exam suboptimal due to body habitus and mildly lordotic projection. Left   lower lobe not well seen. . Otherwise lungs are clear. Costophrenic angles: Obscured on the left. Heart size: Probably normal.    Pulmonary vasculature: is unremarkable. Aorta: Calcifications. Included portion of the upper abdomen: is unremarkable. Bones: No gross bony lesions. Other: None.                  Complete ROS done and is as stated in HPI or otherwise negative  Past Medical History:   Diagnosis Date    Acute on chronic respiratory failure ([address] 11/16/2017    Anemia of chronic disease     ARF (acute respiratory failure) ([address] 12/19/2017    Arthritis     arthritis OA - generalized multiple joints - pt says \"not really any problems\" (6/21/16)    Bleeds easily ([address]     takes Plavix for hx of 2 strokes and PE    CHF (congestive heart failure) ([address] 11/16/2017    Ef 40 per cardiology note 9/2020    Chronic diastolic heart failure (HCC)     Chronic kidney disease     Chronic pain     pt denies this    Chronic respiratory failure (HCC)     Cirrhosis ([address]     per patient dx 2019    Coagulation disorder ([address]     pernicious anemia chronic - tx with procrit     Coronary atherosclerosis of native coronary vessel     no stents or interventions, denies mi    Current use of long term anticoagulation     Plavix    Diabetic neuropathy ([address]     Dialysis patient ([address]     M/W/F in [facility] as  Renal- access in right arm    Difficulty walking     ambulates with walker    ESRD (end stage renal disease) on dialysis ([address]     [facility] MWF since 2014    Fibromyalgia     GERD (gastroesophageal reflux disease)     managed well with Protonix    Gout     managed well with Allopurinol    Hemodialysis access site with mature fistula ([address] 8/27/14    permanent AV site left forearm placed    Hemodialysis access, AV graft ([address] 9/9/2014 8/27/14 (Dr Amy Jama) Brachial artery antecubital forearm loop fistula.       Hepatomegaly     not sure why this is here- per pt    History of CVA (cerebrovascular accident)     X2- slight right sided weakness- blind Right eye    History of echocardiogram     12/12/17- Atrial septum: milr right to left shunt induced by valsalva maneuver    History of stroke 8/28/2014    Previous right residual     Hypercholesterolemia     Hypotension 12/16/2017    Hypotension     midodrone b.i.d.    Hypothyroidism approx 2000    controlled with meds     Lupus ([address]     systemic    Morbid obesity ([address] 06/21/2016    BMI 36.6    NSVT (nonsustained ventricular tachycardia) ([address] 6/19/2014    Orthostatic hypotension     Managed well with     CASSIE (obstructive sleep apnea) 06/21/2016    BIPAP     PE (pulmonary thromboembolism) ([address] 2017    Poor historian 6/21/16    probably related to CVA in 2014    Sepsis ([address] 6/19/2014    UNCLEAR ETIOLOGY      Stroke ([address]     x 2- last stroke 2016- lost vision R eye    Supplemental oxygen dependent     2LPM via NC     Type 2 diabetes mellitus ([address]     insulin /AVG BS:130 / A1C 5.7 per patient (9/2020)hypo s/s below 80    Unable to bear weight 8/28/2014    Unspecified sleep apnea 11/13    bi-pap    Vertigo as late effect of stroke 6/21/16    Weakness of both lower limbs 8/28/2014      Past Surgical History:   Procedure Laterality Date    COLONOSCOPY N/A 6/23/2016    COLONOSCOPY  BMI 37 performed by Sandeep Alexander MD at [address] [facility] N/A 8/27/2019    COLONOSCOPY/BMI 35 performed by Melba Harrison MD at [address] [facility] HX COLONOSCOPY  2020    HX ERCP      HX GI      insertion of Peritoneal port RLQ of abdomen     HX GI      removal of peritoneal port     HX HYSTERECTOMY  1975    unknown ovaries    HX LAP CHOLECYSTECTOMY  2009    HX VASCULAR ACCESS  2013 and 4    multiple temporaries    HX VASCULAR ACCESS  8/27/14    permanent placement left forearm    HX VASCULAR ACCESS      Subclavian port in R chest wall (active)     VASCULAR SURGERY PROCEDURE UNLIST  8-27-14    L Brachial artery antecubital forearm loop fistula    VASCULAR SURGERY PROCEDURE UNLIST      L UA AV shunt placement (inactive)     VASCULAR SURGERY PROCEDURE UNLIST Left 01/10/2018    fistulogram/venogram    VASCULAR SURGERY PROCEDURE UNLIST Right 2018    AVG    VASCULAR SURGERY PROCEDURE UNLIST Left 2018    Ligation of  AV graft    VASCULAR SURGERY PROCEDURE UNLIST Right 2020    DECLOT RIGHT FOREARM AV GRAFT (Right Arm      Allergies   Allergen Reactions    Codeine Other (comments)     Pt can not recall reaction    Oxycodone Not Reported This Time     Patient denies allergy to medication      Social History     Tobacco Use    Smoking status: Never Smoker    Smokeless tobacco: Never Used   Substance Use Topics    Alcohol use: No      Family History   Problem Relation Age of Onset    Heart Disease Mother     Diabetes Mother     Lung Disease Mother     Heart Disease Father     Stroke Father     Diabetes Father     Cancer Sister     Diabetes Sister     Heart Disease Sister         Objective:     Visit Vitals  BP (!) 177/88   Pulse 77   Temp 97.8 °F (36.6 °C)   Resp 16   Ht 5' 3\" (1.6 m)   Wt 90.7 kg (200 lb)   SpO2 97%   BMI 35.43 kg/m²      Temp (24hrs), Av.8 °F (36.6 °C), Min:97.8 °F (36.6 °C), Max:97.8 °F (36.6 °C)    Oxygen Therapy  O2 Sat (%): 97 % (20 1427)  Pulse via Oximetry: 74 beats per minute (20 1427)  O2 Device: Nasal cannula (20 1049)  O2 Flow Rate (L/min): 2 l/min (20 1049)  Physical Exam:  General:    Drowsy, cooperative, no distress, appears stated age. Ill appearing  Head:   Normocephalic, without obvious abnormality, atraumatic. Nose:  Nares normal. No drainage or sinus tenderness. Lungs:   CTA. resp even and nonlabored  Heart:  S1S2 present without murmurs rubs gallops. RRR.  Trace pedal edema bilaterally. Abdomen:   Soft, non-tender. Not distended. Bowel sounds normal. No masses  Extremities: No cyanosis. Moves ext spontaneously. Skin:     Texture, turgor normal. No rashes or lesions. Not Jaundiced  Neurologic: Drowsy, oriented X3. No focal deficits. Data Review:   Recent Results (from the past 24 hour(s))   CBC WITH AUTOMATED DIFF    Collection Time: 12/02/20 10:08 AM   Result Value Ref Range    WBC 11.5 (H) 4.3 - 11.1 K/uL    RBC 2.89 (L) 4.05 - 5.2 M/uL    HGB 9.7 (L) 11.7 - 15.4 g/dL    HCT 31.9 (L) 35.8 - 46.3 %    .4 (H) 79.6 - 97.8 FL    MCH 33.6 (H) 26.1 - 32.9 PG    MCHC 30.4 (L) 31.4 - 35.0 g/dL    RDW 15.8 (H) 11.9 - 14.6 %    PLATELET 591 (L) 949 - 450 K/uL    MPV 10.2 9.4 - 12.3 FL    ABSOLUTE NRBC 0.00 0.0 - 0.2 K/uL    DF AUTOMATED      NEUTROPHILS 80 (H) 43 - 78 %    LYMPHOCYTES 12 (L) 13 - 44 %    MONOCYTES 4 4.0 - 12.0 %    EOSINOPHILS 2 0.5 - 7.8 %    BASOPHILS 1 0.0 - 2.0 %    IMMATURE GRANULOCYTES 1 0.0 - 5.0 %    ABS. NEUTROPHILS 9.2 (H) 1.7 - 8.2 K/UL    ABS. LYMPHOCYTES 1.4 0.5 - 4.6 K/UL    ABS. MONOCYTES 0.5 0.1 - 1.3 K/UL    ABS. EOSINOPHILS 0.3 0.0 - 0.8 K/UL    ABS. BASOPHILS 0.1 0.0 - 0.2 K/UL    ABS. IMM. GRANS. 0.1 0.0 - 0.5 K/UL   METABOLIC PANEL, COMPREHENSIVE    Collection Time: 12/02/20 10:08 AM   Result Value Ref Range    Sodium 135 (L) 136 - 145 mmol/L    Potassium 6.1 (HH) 3.5 - 5.1 mmol/L    Chloride 99 98 - 107 mmol/L    CO2 23 21 - 32 mmol/L    Anion gap 13 7 - 16 mmol/L    Glucose 102 (H) 65 - 100 mg/dL    BUN 92 (H) 8 - 23 MG/DL    Creatinine 13.00 (H) 0.6 - 1.0 MG/DL    GFR est AA 4 (L) >60 ml/min/1.73m2    GFR est non-AA 3 (L) >60 ml/min/1.73m2    Calcium 9.0 8.3 - 10.4 MG/DL    Bilirubin, total 1.0 0.2 - 1.1 MG/DL    ALT (SGPT) 12 12 - 65 U/L    AST (SGOT) 42 (H) 15 - 37 U/L    Alk.  phosphatase 161 (H) 50 - 136 U/L    Protein, total 7.9 6.3 - 8.2 g/dL    Albumin 3.1 (L) 3.2 - 4.6 g/dL    Globulin 4.8 (H) 2.3 - 3.5 g/dL    A-G Ratio 0.6 (L) 1.2 - 3.5     LIPASE    Collection Time: 12/02/20 10:08 AM   Result Value Ref Range    Lipase 134 73 - 393 U/L   TROPONIN-HIGH SENSITIVITY    Collection Time: 12/02/20 10:08 AM   Result Value Ref Range    Troponin-High Sensitivity 39.7 (H) 0 - 14 pg/mL   MAGNESIUM    Collection Time: 12/02/20 10:08 AM   Result Value Ref Range    Magnesium 2.4 1.8 - 2.4 mg/dL   PHOSPHORUS    Collection Time: 12/02/20 10:08 AM   Result Value Ref Range    Phosphorus 6.7 (H) 2.3 - 3.7 MG/DL   LACTIC ACID    Collection Time: 12/02/20 11:37 AM   Result Value Ref Range    Lactic acid 0.8 0.4 - 2.0 MMOL/L   POTASSIUM    Collection Time: 12/02/20 11:53 AM   Result Value Ref Range    Potassium 6.1 (HH) 3.5 - 5.1 mmol/L         Assessment and Plan:      Active Hospital Problems    Diagnosis Date Noted    Fluid overload 12/02/2020    Chronic respiratory failure with hypoxia and hypercapnia (HCC) 02/27/2018    ESRD (end stage renal disease) on dialysis ([facility] 07/20/2017    Chronic diastolic heart failure (HCC)     Coronary atherosclerosis of native coronary vessel     DM (diabetes mellitus) ([facility] 07/16/2014    CASSIE (obstructive sleep apnea) 07/16/2014     BIPAP      Hypothyroidism 06/25/2014    Morbid obesity ([facility] 06/19/2014     Admit to remote tele    Fluid overload  Missed HD X2 sessions  Nephrology consulted  Pt to HD from ER    DM II  Home meds    CAD  Home meds    Chest pain  EKG  Trend troponin  Does not appear to be ACS    ESRD on HD MWF  Nephrology consulted    CASSIE  Home BIPAP at night and with naps    Hypothyroidism  Home meds    Hyperkalemia  HD now        Notes, labs, VS, diagnostic testing reviewed  Time spent with pt 30 min  DVT prophylaxis: heparin sq  Case discussed with pt, care team, Dr. Amy Cramer DNR, discussed with pt  Surrogate decision maker:   Estimated length of stay: >2MN        Radhames Ruiz NP

## 2020-12-03 NOTE — PROGRESS NOTES
Problem: Falls - Risk of  Goal: *Absence of Falls  Description: Document Kate Heads Fall Risk and appropriate interventions in the flowsheet. Outcome: Progressing Towards Goal  Note: Fall Risk Interventions:  Mobility Interventions: Communicate number of staff needed for ambulation/transfer, OT consult for ADLs, Patient to call before getting OOB, PT Consult for mobility concerns, PT Consult for assist device competence, Utilize walker, cane, or other assistive device         Medication Interventions: Evaluate medications/consider consulting pharmacy, Patient to call before getting OOB, Teach patient to arise slowly    Elimination Interventions: Call light in reach, Patient to call for help with toileting needs, Stay With Me (per policy), Toilet paper/wipes in reach, Toileting schedule/hourly rounds              Problem: Patient Education: Go to Patient Education Activity  Goal: Patient/Family Education  Outcome: Progressing Towards Goal     Problem: Pressure Injury - Risk of  Goal: *Prevention of pressure injury  Description: Document Remington Scale and appropriate interventions in the flowsheet.   Outcome: Progressing Towards Goal  Note: Pressure Injury Interventions:       Moisture Interventions: Absorbent underpads, Apply protective barrier, creams and emollients, Assess need for specialty bed, Check for incontinence Q2 hours and as needed, Limit adult briefs, Maintain skin hydration (lotion/cream), Minimize layers, Moisture barrier    Activity Interventions: Assess need for specialty bed, Increase time out of bed, Pressure redistribution bed/mattress(bed type), PT/OT evaluation    Mobility Interventions: Assess need for specialty bed, Float heels, HOB 30 degrees or less, Pressure redistribution bed/mattress (bed type), PT/OT evaluation    Nutrition Interventions: Document food/fluid/supplement intake    Friction and Shear Interventions: Apply protective barrier, creams and emollients, Foam dressings/transparent film/skin sealants, HOB 30 degrees or less, Lift sheet, Lift team/patient mobility team, Minimize layers                Problem: Patient Education: Go to Patient Education Activity  Goal: Patient/Family Education  Outcome: Progressing Towards Goal

## 2020-12-03 NOTE — DIALYSIS
TRANSFER OUT -DIALYSIS    Hemodialysis treatment completed without complications. Patient alert and oriented and VS stable  /69  P 77       3 Kgs removed. Needles X2 removed from access and manual pressure held until hemostasis complete and pressure dressing applied. Meds given tylenol po for headache. Patient to 604 after dialysis.

## 2020-12-03 NOTE — PROGRESS NOTES
Hospitalist Progress Note     Admit Date:  2020 10:07 AM   Name:  Mimi Payton   Age:  70 y.o.  :  1945   MRN:  [id number]   PCP:  Connor Waters MD  Treatment Team: Attending Provider: Sarah Ac MD; Consulting Provider: Sarah Ac MD; Physical Therapist: Edson Singh; Occupational Therapist: Daniela Camacho OT; Utilization Review: Clemente Bates; Care Manager: Patito Deng RN    Subjective:   Ms. Jet Doll is a 71 yo female with PMH of acute on chronic resp failure, anemia, CVA, PE, CHF, ESRD on HD, cirrhosis, DM II, CASSIE in [place] who presented with c/o generalized weakness. Missed 2 sessions of HD due to \"feeling bad\". Dialyzes MWF [facility]. Last session . She was found to have hyperkalemia, fluid overload. Nephrology consulted. Pt taken to HD from ER. Also c/o chest pain left chest, non radiating, reproducible on palpation of left chest.   She is drowsy but oriented X3. She is anuric. Denies SOB, cough, fever, chills, sick contact, loss of taste or smell, n/v/d.        s/p Thrombectomy, fistulogram and central venogram, right AV graft.  dialysis 2kg removed  12/3   dialysis 3kg removed  Added bipap for night time. Uses at home  Still weak. Can barely stand with PT. Cant walk. No fevers. Just nauseated   Would benefit from another day     Nursing notes and chart reviewed. Review of Systems negative with exception of pertinent positives noted above.       Objective:     Patient Vitals for the past 24 hrs:   Temp Pulse Resp BP SpO2   20 1344  77  (!) 170/69    20 1248  75  (!) 178/65    20 1223  74  (!) 172/79    20 1157  74  (!) 177/78    20 1124  73  (!) 191/75    20 1100  75  (!) 194/77    20 1030  77  (!) 201/73    20 1000  78  (!) 167/86    20 0946  74  (!) 154/73    20 0719 97.6 °F (36.4 °C) 74 18 (!) 147/75 97 %   20 0405 98.2 °F (36.8 °C) 68 18 (!) 149/62 91 %   12/03/20 0400  64      12/03/20 0000  64      12/02/20 2313 99.2 °F (37.3 °C) 97 17 (!) 146/67 95 %   12/02/20 2000  64      12/02/20 1930 98.3 °F (36.8 °C) 63  (!) 146/57 96 %   12/02/20 1834 97.4 °F (36.3 °C) 68 18 (!) 144/64 96 %   12/02/20 1727  70  130/71    12/02/20 1700  71  119/87    12/02/20 1630  71  (!) 158/74    12/02/20 1600  73  (!) 148/80    12/02/20 1530  71  137/65      Oxygen Therapy  O2 Sat (%): 97 % (12/03/20 0719)  Pulse via Oximetry: 74 beats per minute (12/02/20 1427)  O2 Device: Room air (12/03/20 0719)  O2 Flow Rate (L/min): 2 l/min (12/02/20 1834)    Intake/Output Summary (Last 24 hours) at 12/3/2020 1515  Last data filed at 12/3/2020 1344  Gross per 24 hour   Intake    Output 5000 ml   Net -5000 ml         General:    Well nourished. Alert. On 2L oxygen. Weak appearing. Soft voice. CV:   RRR. No murmur, rub, or gallop. Lungs:   CTAB. No wheezing, rhonchi, or rales. Abdomen:   Soft, nontender, nondistended. Extremities: Warm and dry. No cyanosis. some lower extremity edema. .   Skin:     No rashes or jaundice. Data Review:  I have reviewed all labs, meds, telemetry events, and studies from the last 24 hours.     Recent Results (from the past 24 hour(s))   TROPONIN-HIGH SENSITIVITY    Collection Time: 12/02/20  7:25 PM   Result Value Ref Range    Troponin-High Sensitivity 44.7 (H) 0 - 14 pg/mL   GLUCOSE, POC    Collection Time: 12/02/20  7:40 PM   Result Value Ref Range    Glucose (POC) 65 65 - 100 mg/dL   GLUCOSE, POC    Collection Time: 12/02/20  8:23 PM   Result Value Ref Range    Glucose (POC) 79 65 - 901 mg/dL   METABOLIC PANEL, COMPREHENSIVE    Collection Time: 12/03/20  6:15 AM   Result Value Ref Range    Sodium 137 (L) 138 - 145 mmol/L    Potassium 4.8 3.5 - 5.1 mmol/L    Chloride 100 98 - 107 mmol/L    CO2 26 21 - 32 mmol/L    Anion gap 11 7 - 16 mmol/L    Glucose 65 65 - 100 mg/dL    BUN 52 (H) 8 - 23 MG/DL Creatinine 8.81 (H) 0.6 - 1.0 MG/DL    GFR est AA 6 (L) >60 ml/min/1.73m2    GFR est non-AA 5 (L) >60 ml/min/1.73m2    Calcium 8.5 8.3 - 10.4 MG/DL    Bilirubin, total 0.5 0.2 - 1.1 MG/DL    ALT (SGPT) 9 (L) 12 - 65 U/L    AST (SGOT) 24 15 - 37 U/L    Alk. phosphatase 152 (H) 50 - 136 U/L    Protein, total 7.4 6.3 - 8.2 g/dL    Albumin 3.1 (L) 3.2 - 4.6 g/dL    Globulin 4.3 (H) 2.3 - 3.5 g/dL    A-G Ratio 0.7 (L) 1.2 - 3.5     MAGNESIUM    Collection Time: 12/03/20  6:15 AM   Result Value Ref Range    Magnesium 2.1 1.8 - 2.4 mg/dL   CBC WITH AUTOMATED DIFF    Collection Time: 12/03/20  6:15 AM   Result Value Ref Range    WBC 7.4 4.3 - 11.1 K/uL    RBC 2.70 (L) 4.05 - 5.2 M/uL    HGB 9.0 (L) 11.7 - 15.4 g/dL    HCT 30.1 (L) 35.8 - 46.3 %    .5 (H) 79.6 - 97.8 FL    MCH 33.3 (H) 26.1 - 32.9 PG    MCHC 29.9 (L) 31.4 - 35.0 g/dL    RDW 15.7 (H) 11.9 - 14.6 %    PLATELET 675 (L) 949 - 450 K/uL    MPV 10.2 9.4 - 12.3 FL    ABSOLUTE NRBC 0.00 0.0 - 0.2 K/uL    DF AUTOMATED      NEUTROPHILS 76 43 - 78 %    LYMPHOCYTES 13 13 - 44 %    MONOCYTES 6 4.0 - 12.0 %    EOSINOPHILS 4 0.5 - 7.8 %    BASOPHILS 1 0.0 - 2.0 %    IMMATURE GRANULOCYTES 1 0.0 - 5.0 %    ABS. NEUTROPHILS 5.7 1.7 - 8.2 K/UL    ABS. LYMPHOCYTES 0.9 0.5 - 4.6 K/UL    ABS. MONOCYTES 0.4 0.1 - 1.3 K/UL    ABS. EOSINOPHILS 0.3 0.0 - 0.8 K/UL    ABS. BASOPHILS 0.1 0.0 - 0.2 K/UL    ABS. IMM.  GRANS. 0.1 0.0 - 0.5 K/UL   GLUCOSE, POC    Collection Time: 12/03/20  7:16 AM   Result Value Ref Range    Glucose (POC) 83 65 - 100 mg/dL   HEP B SURFACE AG    Collection Time: 12/03/20  9:51 AM   Result Value Ref Range    Hep B Surface Ag NONREACTIVE NR          All Micro Results     None          Current Meds:  Current Facility-Administered Medications   Medication Dose Route Frequency    calcium acetate (phosphate binder) (PHOSLO) tablet 1,334 mg  1,334 mg Oral TID WITH MEALS    clopidogreL (PLAVIX) tablet 75 mg  75 mg Oral DAILY    cyanocobalamin tablet 1,000 mcg  1,000 mcg Oral DAILY    docusate sodium (COLACE) capsule 100 mg  100 mg Oral DAILY    B complex-vitamin C-folic acid (NEPHRO-WAQAR) 0.8 mg tab  1 Tab Oral DAILY    gabapentin (NEURONTIN) capsule 200 mg  200 mg Oral TID    insulin glargine (LANTUS) injection 25 Units  25 Units SubCUTAneous QHS PRN    levothyroxine (SYNTHROID) tablet 200 mcg  200 mcg Oral ACB    pantoprazole (PROTONIX) tablet 40 mg  40 mg Oral ACB    rosuvastatin (CRESTOR) tablet 20 mg  20 mg Oral QHS    allopurinoL (ZYLOPRIM) tablet 100 mg  100 mg Oral DAILY    sodium chloride (NS) flush 5-40 mL  5-40 mL IntraVENous Q8H    sodium chloride (NS) flush 5-40 mL  5-40 mL IntraVENous PRN    acetaminophen (TYLENOL) tablet 650 mg  650 mg Oral Q6H PRN    Or    acetaminophen (TYLENOL) suppository 650 mg  650 mg Rectal Q6H PRN    polyethylene glycol (MIRALAX) packet 17 g  17 g Oral DAILY PRN    heparin (porcine) injection 5,000 Units  5,000 Units SubCUTAneous Q8H    dextrose 40% (GLUTOSE) oral gel 1 Tube  15 g Oral PRN    glucagon (GLUCAGEN) injection 1 mg  1 mg IntraMUSCular PRN    dextrose (D50W) injection syrg 12.5-25 g  25-50 mL IntraVENous PRN       Other Studies (last 24 hours):  No results found.     Assessment and Plan:     Hospital Problems as of 12/3/2020 Date Reviewed: 9/24/2020          Codes Class Noted - Resolved POA    * (Principal) Fluid overload ICD-10-CM: E87.70  ICD-9-CM: 276.69  12/2/2020 - Present Yes        Chronic respiratory failure with hypoxia and hypercapnia (HCC) ICD-10-CM: J96.11, J96.12  ICD-9-CM: 518.83, 799.02, 786.09  2/27/2018 - Present Yes        ESRD (end stage renal disease) on dialysis [facility]) ICD-10-CM: N18.6, Z99.2  ICD-9-CM: 585.6, V45.11  7/20/2017 - Present Yes        Chronic diastolic heart failure (HCC) ICD-10-CM: I50.32  ICD-9-CM: 428.32  Unknown - Present Yes        Coronary atherosclerosis of native coronary vessel ICD-10-CM: I25.10  ICD-9-CM: 414.01  Unknown - Present Yes        ESRD on hemodialysis ([facility] (Chronic) ICD-10-CM: N18.6, Z99.2  ICD-9-CM: 585.6, V45.11  8/28/2014 - Present Yes        CASSIE (obstructive sleep apnea) (Chronic) ICD-10-CM: G47.33  ICD-9-CM: 327.23  7/16/2014 - Present Yes    Overview Signed 7/16/2014  1:18 AM by Maryan Monsivais NP     BIPAP             DM (diabetes mellitus) ([facility] (Chronic) ICD-10-CM: E11.9  ICD-9-CM: 250.00  7/16/2014 - Present Yes        Hypothyroidism ICD-10-CM: E03.9  ICD-9-CM: 244.9  6/25/2014 - Present Yes        Morbid obesity ([facility] (Chronic) ICD-10-CM: E66.01  ICD-9-CM: 278.01  6/19/2014 - Present Yes              PLAN:    Fluid overload  ESRD on HD MWF  Weakness  Missed HD X2 sessions  Nephrology appreciated  HD on 12/2 and 12/3  PT eval     DM II  Home meds     CAD  Home meds     Chest pain  Trend troponin  Does not appear to be ACS     CASSIE  Home BIPAP at night and with naps     Hypothyroidism  Home meds     Hyperkalemia, resolved  HD now    DC planning/Dispo:  Home when able  DVT ppx:  heparin    Signed:  Rica Fitzgerald MD

## 2020-12-03 NOTE — PROGRESS NOTES
Hourly rounds completed this shift. All needs met at this time. Bed low/locked. Call light within reach. Will continue to monitor and give bedside report to oncoming nurse. Pt currently sitting up in recliner. Tylenol given for headache.

## 2020-12-03 NOTE — PROGRESS NOTES
12/02/20 1944   Skin Integumentary   Skin Integumentary (WDL) X    Pressure  Injury Documentation Pressure Injury Noted-See Wound LDA to Document   Skin Color Ecchymosis (comment)   Skin Condition/Temp Dry; Cool   Skin Integrity Excoriation; Wound (add Wound LDA)  (sacrum, groin folds)   Turgor Epidermis thin w/ loss of subcut tissue   Wound Prevention and Protection Methods   Orientation of Wound Prevention Posterior   Location of Wound Prevention Sacrum/Coccyx   Dressing Present  Yes   Dressing Status Intact   Wound Offloading (Prevention Methods) Bed, pressure reduction mattress;Repositioning;Turning   Pt is alert and oriented x4. Wears 2L o2 at all times. Pt has open skin on sacrum, allevyn placed. R arm AV fistula. Pt has ecchymosis in BUE. A scab on the 2nd toe on L foot. Ghulam BLE.

## 2020-12-03 NOTE — PROGRESS NOTES
Unable to speak with patient to complete assessment - spoke with patient's spouse, Nadia Howell. He stated patient lives at home - uses a walker for ambulation and has a bipap - confirmed dialysis location - [facility] 11am. No other dme or home health reported. Patient plans to go home @ d/c - no anticipated needs at this time. Care Management Interventions  PCP Verified by CM:  Yes  Mode of Transport at Discharge: Self  Transition of Care Consult (CM Consult): Discharge Planning  Current Support Network: Shelter, Lives with Spouse  Confirm Follow Up Transport: Family  Discharge Location  Discharge Placement: Home with family assistance

## 2020-12-03 NOTE — PROGRESS NOTES
Problem: Self Care Deficits Care Plan (Adult)  Goal: *Acute Goals and Plan of Care (Insert Text)  Outcome: Progressing Towards Goal  Note: 1. Patient will complete lower body bathing and dressing with CGA and adaptive equipment as needed. 2. Patient will complete toileting with supervision. 3. Patient will tolerate 30 minutes of OT treatment with 2-3 rest breaks to increase activity tolerance for ADLs. 4. Patient will complete functional transfers with supervision and adaptive equipment as needed. 5. Patient will complete functional mobility for household distances with supervision and appropriate safety awareness. Timeframe: 7 visits       OCCUPATIONAL THERAPY: Initial Assessment, Daily Note, and PM 12/3/2020  INPATIENT: OT Visit Days: 1  [place] / Plan[place] / Product Type: Medicare /      NAME/AGE/GENDER: Matthew Harrison is a 70 y.o. female   PRIMARY DIAGNOSIS:  Fluid overload [E87.70] Fluid overload Fluid overload        ICD-10: Treatment Diagnosis:    Generalized Muscle Weakness (M62.81)  Other lack of cordination (R27.8)   Precautions/Allergies:     Codeine and Oxycodone      ASSESSMENT:     Ms. Deanne Grimm presents to the hospital with fluid overload. Pt is supine in the bed with spouse at bedside. Pt had dialysis today and reports she is not feeling well. Pt needed encouragement for activity. Pt required moderate assistance x 2 to sit to the edge of the bed. Pt reports she feels lightheaded and sat on the edge of the bed for several minutes. Pt donned socks at the edge of the bed but her dynamic sitting balance was impaired with pt losing her balance with crossing her legs. Pt stood with minimal assistance x 2 and again reports not feeling well and being lightheaded. Pt did not tolerate standing for very long before sitting back on the bed and asking to return supine.  Pt presents with decreased activity tolerance, generalized weakness, and impaired balance impacting independence with ADL/functional mobility. Pt will benefit from OT services to address stated goals and plan of care. This section established at most recent assessment   PROBLEM LIST (Impairments causing functional limitations):  Decreased Strength  Decreased ADL/Functional Activities  Decreased Transfer Abilities  Decreased Ambulation Ability/Technique  Decreased Balance  Increased Pain  Decreased Activity Tolerance  Increased Fatigue  Increased Shortness of Breath  Decreased Flexibility/Joint Mobility  Edema/Girth  Decreased [place] with Home Exercise Program   INTERVENTIONS PLANNED: (Benefits and precautions of occupational therapy have been discussed with the patient.)  Activities of daily living training  Adaptive equipment training  Balance training  Clothing management  Donning&doffing training  Neuromuscular re-eduation  Therapeutic activity  Therapeutic exercise     TREATMENT PLAN: Frequency/Duration: Follow patient 3 times per week to address above goals. Rehabilitation Potential For Stated Goals: Good     REHAB RECOMMENDATIONS (at time of discharge pending progress):    Placement: It is my opinion, based on this patient's performance to date, that Ms. Radha Min may benefit from [address] after discharge due to the functional deficits listed above that are likely to improve with skilled rehabilitation because he/she has multiple medical issues that affect his/her functional mobility in the community.   Equipment:   TBd              OCCUPATIONAL PROFILE AND HISTORY:   History of Present Injury/Illness (Reason for Referral):  See H&P  Past Medical History/Comorbidities:   Ms. Radha Min  has a past medical history of Acute on chronic respiratory failure ([address] (11/16/2017), Anemia of chronic disease, ARF (acute respiratory failure) ([address] (12/19/2017), Arthritis, Bleeds easily ([address] CHF (congestive heart failure) ([address] (11/16/2017), Chronic diastolic heart failure ([address] Chronic kidney disease, Chronic pain, Chronic respiratory failure ([address] Cirrhosis ([address] Coagulation disorder ([address] Coronary atherosclerosis of native coronary vessel, Current use of long term anticoagulation, Diabetic neuropathy ([address] Dialysis patient ([address] Difficulty walking, ESRD (end stage renal disease) on dialysis ([address] Fibromyalgia, GERD (gastroesophageal reflux disease), Gout, Hemodialysis access site with mature fistula ([address] (8/27/14), Hemodialysis access, AV graft ([address] (9/9/2014), Hepatomegaly, History of CVA (cerebrovascular accident), History of echocardiogram, History of stroke (8/28/2014), Hypercholesterolemia, Hypotension (12/16/2017), Hypotension, Hypothyroidism (approx 2000), Lupus ([address] Morbid obesity ([address] (06/21/2016), NSVT (nonsustained ventricular tachycardia) ([address] (6/19/2014), Orthostatic hypotension, CASSIE (obstructive sleep apnea) (06/21/2016), PE (pulmonary thromboembolism) ([address] (2017), Poor historian (6/21/16), Sepsis ([address] (6/19/2014), Stroke ([address] Supplemental oxygen dependent, Type 2 diabetes mellitus ([address] Unable to bear weight (8/28/2014), Unspecified sleep apnea (11/13), Vertigo as late effect of stroke (6/21/16), and Weakness of both lower limbs (8/28/2014). Ms. Janessa Bartlett  has a past surgical history that includes colonoscopy (N/A, 6/23/2016); colonoscopy (N/A, 8/27/2019); hx lap cholecystectomy (2009); hx hysterectomy (1975); hx vascular access (2013 and 4); hx vascular access (8/27/14); hx vascular access; hx gi; hx gi; hx ercp; vascular surgery procedure unlist (8-27-14); vascular surgery procedure unlist; vascular surgery procedure unlist (Left, 01/10/2018); vascular surgery procedure unlist (Right, 02/09/2018); vascular surgery procedure unlist (Left, 05/01/2018); vascular surgery procedure unlist (Right, 07/2020); and hx colonoscopy (2020).   Social History/Living Environment:   Home Environment: Private residence  # Steps to Enter: 0  One/Two Story Residence: One story  Living Alone: No  Support Systems: Spouse/Significant Other/Partner  Patient Expects to be Discharged to[de-identified] Private residence  Current DME Used/Available at Home: Grab bars, Shower chair, Walker, rollator  Tub or Shower Type: Shower  Prior Level of Function/Work/Activity:  Pt lives with her spouse; uses a rollator; no falls; states her spouse assists with bathing, cooking, and ADL as needed  Personal Factors: Other factors that influence how disability is experienced by the patient:  co-morbidities    Number of Personal Factors/Comorbidities that affect the Plan of Care: Expanded review of therapy/medical records (1-2):  MODERATE COMPLEXITY   ASSESSMENT OF OCCUPATIONAL PERFORMANCE[de-identified]   Activities of Daily Living:   Basic ADLs (From Assessment) Complex ADLs (From Assessment)   Feeding: Setup  Oral Facial Hygiene/Grooming: Minimum assistance  Bathing: Moderate assistance  Upper Body Dressing: Minimum assistance  Lower Body Dressing: Moderate assistance  Toileting: Moderate assistance Instrumental ADL  Meal Preparation: Maximum assistance  Homemaking: Total assistance   Grooming/Bathing/Dressing Activities of Daily Living     Cognitive Retraining  Safety/Judgement: Fall prevention                       Bed/Mat Mobility  Supine to Sit: Moderate assistance;Assist x2  Sit to Supine: Moderate assistance;Assist x2  Sit to Stand: Minimum assistance;Assist x2  Stand to Sit: Minimum assistance;Assist x2  Scooting: Moderate assistance     Most Recent Physical Functioning:   Gross Assessment:  AROM: Generally decreased, functional  Strength: Generally decreased, functional  Coordination: Generally decreased, functional               Posture:  Posture (WDL): Exceptions to WDL  Posture Assessment: Kyphosis  Balance:  Sitting: Impaired  Sitting - Static: Good (unsupported)  Sitting - Dynamic: Fair (occasional)  Standing: Impaired; With support  Standing - Static: Fair  Standing - Dynamic : Fair Bed Mobility:  Supine to Sit: Moderate assistance;Assist x2  Sit to Supine: Moderate assistance;Assist x2  Scooting: Moderate assistance  Wheelchair Mobility:     Transfers:  Sit to Stand: Minimum assistance;Assist x2  Stand to Sit: Minimum assistance;Assist x2            Patient Vitals for the past 6 hrs:   BP BP Patient Position SpO2 O2 Flow Rate (L/min) Pulse   12/03/20 1100 (!) 194/77 -- -- -- 75   12/03/20 1124 (!) 191/75 -- -- -- 73   12/03/20 1157 (!) 177/78 -- -- -- 74   12/03/20 1223 (!) 172/79 -- -- -- 74   12/03/20 1248 (!) 178/65 -- -- -- 75   12/03/20 1344 (!) 170/69 -- -- -- 77   12/03/20 1557 (!) 156/80 At rest 97 % 2 l/min 78       Mental Status  Neurologic State: Alert  Orientation Level: Oriented X4  Cognition: Follows commands  Perception: Appears intact  Perseveration: No perseveration noted  Safety/Judgement: Fall prevention                          Physical Skills Involved:  Range of Motion  Balance  Strength  Activity Tolerance Cognitive Skills Affected (resulting in the inability to perform in a timely and safe manner):  None Psychosocial Skills Affected:  Habits/Routines   Number of elements that affect the Plan of Care: 3-5:  MODERATE COMPLEXITY   CLINICAL DECISION MAKING:   [facility] 6 Clicks   Daily Activity Inpatient Short Form  How much help from another person does the patient currently need. .. Total A Lot A Little None   1. Putting on and taking off regular lower body clothing? [] 1   [x] 2   [] 3   [] 4   2. Bathing (including washing, rinsing, drying)? [] 1   [x] 2   [] 3   [] 4   3. Toileting, which includes using toilet, bedpan or urinal?   [] 1   [x] 2   [] 3   [] 4   4. Putting on and taking off regular upper body clothing? [] 1   [] 2   [x] 3   [] 4   5. Taking care of personal grooming such as brushing teeth? [] 1   [] 2   [x] 3   [] 4   6. Eating meals? [] 1   [] 2   [x] 3   [] 4   © 2007, Trustees of [facility] [employer], under license to [employer].  All rights reserved Score:  Initial: 15 Most Recent: X (Date: -- )    Interpretation of Tool:  Represents activities that are increasingly more difficult (i.e. Bed mobility, Transfers, Gait). Medical Necessity:     Patient demonstrates   good   rehab potential due to higher previous functional level. Reason for Services/Other Comments:  Patient continues to require skilled intervention due to   Decreased independence with ADL/functional transfers  . Use of outcome tool(s) and clinical judgement create a POC that gives a: LOW COMPLEXITY         TREATMENT:   (In addition to Assessment/Re-Assessment sessions the following treatments were rendered)     Pre-treatment Symptoms/Complaints:    Pain: Initial:   Pain Intensity 1: 0  Post Session:  0   Today's treatment session addressed Decreased Strength, Decreased ADL/Functional Activities, Decreased Transfer Abilities, Decreased Ambulation Ability/Technique, Decreased Balance, Decreased Activity Tolerance, Increased Fatigue, Increased Shortness of Breath, Decreased Flexibility/Joint Mobility, and Edema/Girth to progress towards achieving goal(s) stated above. During this session,  Physical Therapy addressed  Balance to progress towards their discipline specific goal(s). Co-treatment was necessary to improve patient's ability to increase activity demands, ability to return to normal functional activity, and due to pt to tolerate two separate sessions today . Self Care: (8 minutes): Procedure(s) (per grid) utilized to improve and/or restore self-care/home management as related to dressing. Required minimal visual, verbal, and tactile cueing to facilitate activities of daily living skills and preparing balance for completing unsupported seated ADL .     Braces/Orthotics/Lines/Etc:   IV  O2 Device: Nasal cannula  Treatment/Session Assessment:    Response to Treatment:  Pt limited due to being lightheaded  Interdisciplinary Collaboration:   Physical Therapist  Occupational Therapist  Registered Nurse  After treatment position/precautions:   Supine in bed  Bed/Chair-wheels locked  Call light within reach  RN notified  Family at bedside   Compliance with Program/Exercises: Will assess as treatment progresses. Recommendations/Intent for next treatment session: \"Next visit will focus on advancements to more challenging activities and reduction in assistance provided\".   Total Treatment Duration:  OT Patient Time In/Time Out  Time In: 1504  Time Out: 44 Thad Malik OT

## 2020-12-03 NOTE — PROGRESS NOTES
PT Note    Orders received,chart reviewed. Attempted to see pnt this AM but she is currently off the floor at dialysis. Will attempt again pending scheduling and patient status.     Thank you,   Adolfo Del Valle, PT, DPT

## 2020-12-03 NOTE — DIALYSIS
TRANSFER IN - DIALYSIS    Received patient in dialysis unit from [facility] (unit) for ordered procedure. Consent verified for renal replacement therapy. Patient a/ox4 and vital signs stable. /78,  P77    Hemodialysis initiated using RUE AVF and 15 g needles. Machine settings per MD order. Will monitor during treatment.

## 2020-12-03 NOTE — PROGRESS NOTES
Per dayshift nurse, the only report given was pt came to ER from dialysis, upon my shift assessment pt stated she is a diabetic and told CNA to check sugar. Sugar is 65. Pt is alert and oriented and 2 orange juices were given. No diet order or blood sugar checks on MAR. Will notify MD and continue to monitor.

## 2020-12-03 NOTE — PROGRESS NOTES
Problem: Mobility Impaired (Adult and Pediatric)  Goal: *Acute Goals and Plan of Care (Insert Text)  Outcome: Progressing Towards Goal  Note: LTG:  (1.)Ms. Shu Schneider will move from supine to sit and sit to supine , scoot up and down, and roll side to side in bed with INDEPENDENT within 7 treatment day(s). (2.)Ms. Shu Schneider will transfer from bed to chair and chair to bed with INDEPENDENT using the least restrictive device within 7 treatment day(s). (3.)Ms. Shu Schneider will ambulate with MODIFIED INDEPENDENCE for 25 feet with the least restrictive device within 7 treatment day(s). (4.)Ms. Shu Schneider will tolerate at least 15 min of dynamic standing activity to assist standing ADLs with the least restrictive device within 7 treatment days. PHYSICAL THERAPY: Initial Assessment, Daily Note, and PM 12/3/2020  INPATIENT: PT Visit Days : 1  [place] / [place] / Product Type: Medicare /       NAME/AGE/GENDER: Henok Red is a 70 y.o. female   PRIMARY DIAGNOSIS: Fluid overload [E87.70] Fluid overload Fluid overload        ICD-10: Treatment Diagnosis:    Generalized Muscle Weakness (M62.81)  Other lack of cordination (R27.8)  Difficulty in walking, Not elsewhere classified (R26.2)  Other abnormalities of gait and mobility (R26.89)  Repeated Falls (R29.6)  History of falling (Z91.81)  Unspecified Lack of Coordination (R27.9)   Precaution/Allergies:  Codeine and Oxycodone      ASSESSMENT:     Ms. Shu Schneider is a 70year old F who presents for fluid overload. Prior to hospital admission pt lives with her  in 1 story home with 1 step(s) to enter. Pt endorses no falls in past 6 months. Prior to admission Ms. Pacheco uses a rollator for mobility. Upon entering, pt supine and very lethargic, agreeable to PT evaluation. she reports no pain  at rest. BLE assessment indicates sensation to light touch intact, AROM generally reduced, and strength generally reduced in R LE < L LE.  Pt performed supine > sit with mod Ax2 and extra time, sitting EOB with fair sitting balance control. Sit > stand with min Ax2 using RW, with moderate posture cueing w/ RW. Pnt declined gait and reported mild nausea. At end of session pt supine in bed with all needs within reach, alarm activated for safety, RN notified, MD present . Pt presents as functioning below her baseline, with deficits in mobility including transfers, gait, balance, and activity tolerance. Pt will benefit from skilled therapy services to address stated deficits to promote return to highest level of function, independence, and safety. Will continue to follow. This section established at most recent assessment   PROBLEM LIST (Impairments causing functional limitations):  Decreased Strength  Decreased ADL/Functional Activities  Decreased Transfer Abilities  Decreased Ambulation Ability/Technique  Decreased Balance  Increased Pain  Decreased Activity Tolerance  Decreased Pacing Skills  Decreased Work Simplification/Energy Conservation Techniques   INTERVENTIONS PLANNED: (Benefits and precautions of physical therapy have been discussed with the patient.)  Balance Exercise  Bed Mobility  Family Education  Gait Training  Manual Therapy  Neuromuscular Re-education/Strengthening  Range of Motion (ROM)  Therapeutic Activites  Therapeutic Exercise/Strengthening  Transfer Training     TREATMENT PLAN: Frequency/Duration: 3 times a week for duration of hospital stay  Rehabilitation Potential For Stated Goals: Excellent     REHAB RECOMMENDATIONS (at time of discharge pending progress):    Placement: It is my opinion, based on this patient's performance to date, that Ms. Radha Min may benefit from [address] after discharge due to the functional deficits listed above that are likely to improve with skilled rehabilitation because he/she has multiple medical issues that affect his/her functional mobility in the community.   Equipment:   None at this time HISTORY:   History of Present Injury/Illness (Reason for Referral):  See H&p    Past Medical History/Comorbidities:   Ms. Marisol Snyder  has a past medical history of Acute on chronic respiratory failure ([address] (11/16/2017), Anemia of chronic disease, ARF (acute respiratory failure) ([address] (12/19/2017), Arthritis, Bleeds easily ([address] CHF (congestive heart failure) ([address] (11/16/2017), Chronic diastolic heart failure ([address] Chronic kidney disease, Chronic pain, Chronic respiratory failure ([address] Cirrhosis ([address] Coagulation disorder ([address] Coronary atherosclerosis of native coronary vessel, Current use of long term anticoagulation, Diabetic neuropathy ([address] Dialysis patient ([address] Difficulty walking, ESRD (end stage renal disease) on dialysis ([address] Fibromyalgia, GERD (gastroesophageal reflux disease), Gout, Hemodialysis access site with mature fistula ([address] (8/27/14), Hemodialysis access, AV graft ([address] (9/9/2014), Hepatomegaly, History of CVA (cerebrovascular accident), History of echocardiogram, History of stroke (8/28/2014), Hypercholesterolemia, Hypotension (12/16/2017), Hypotension, Hypothyroidism (approx 2000), Lupus ([address] Morbid obesity ([address] (06/21/2016), NSVT (nonsustained ventricular tachycardia) ([address] (6/19/2014), Orthostatic hypotension, CASSIE (obstructive sleep apnea) (06/21/2016), PE (pulmonary thromboembolism) ([address] (2017), Poor historian (6/21/16), Sepsis ([address] (6/19/2014), Stroke ([address] Supplemental oxygen dependent, Type 2 diabetes mellitus ([address] Unable to bear weight (8/28/2014), Unspecified sleep apnea (11/13), Vertigo as late effect of stroke (6/21/16), and Weakness of both lower limbs (8/28/2014).   Ms. Marisol Snyder  has a past surgical history that includes colonoscopy (N/A, 6/23/2016); colonoscopy (N/A, 8/27/2019); hx lap cholecystectomy (2009); hx hysterectomy (1975); hx vascular access (2013 and 4); hx vascular access (8/27/14); hx vascular access; hx gi; hx gi; hx ercp; vascular surgery procedure unlist (8-27-14); vascular surgery procedure unlist; vascular surgery procedure unlist (Left, 01/10/2018); vascular surgery procedure unlist (Right, 2018); vascular surgery procedure unlist (Left, 2018); vascular surgery procedure unlist (Right, 2020); and hx colonoscopy (). Social History/Living Environment:      Prior Level of Function/Work/Activity:  Mod I household ambulator w/ rollator     Number of Personal Factors/Comorbidities that affect the Plan of Care: 1-2: MODERATE COMPLEXITY   EXAMINATION:   Most Recent Physical Functioning:   Gross Assessment:  AROM: Generally decreased, functional  PROM: Generally decreased, functional  Strength: Generally decreased, functional  Coordination: Generally decreased, functional  Sensation: Impaired               Posture:  Posture (WDL): Exceptions to WDL  Posture Assessment: Kyphosis  Balance:  Sitting: Impaired  Sitting - Static: Good (unsupported)  Sitting - Dynamic: Fair (occasional)  Standing: Impaired  Standing - Static: Fair  Standing - Dynamic : Fair;Constant support Bed Mobility:  Supine to Sit: Moderate assistance;Assist x2  Scooting:  Moderate assistance  Wheelchair Mobility:     Transfers:  Sit to Stand: Minimum assistance;Assist x2  Stand to Sit: Minimum assistance;Assist x2  Gait:            Body Structures Involved:  Bones  Joints  Muscles Body Functions Affected:  Neuromusculoskeletal  Movement Related  Skin Related Activities and Participation Affected:  Learning and Applying Knowledge  General Tasks and Demands  Communication  Mobility  Self Care  Domestic Life  Interpersonal Interactions and Relationships  Community, Social and Civic Life   Number of elements that affect the Plan of Care: 3: MODERATE COMPLEXITY   CLINICAL PRESENTATION:   Presentation: Stable and uncomplicated: LOW COMPLEXITY   CLINICAL DECISION MAKIN [facility] Inpatient Short Form  How much difficulty does the patient currently have. .. Unable A Lot A Little None   1. Turning over in bed (including adjusting bedclothes, sheets and blankets)? [] 1   [] 2   [x] 3   [] 4   2. Sitting down on and standing up from a chair with arms ( e.g., wheelchair, bedside commode, etc.)   [] 1   [] 2   [x] 3   [] 4   3. Moving from lying on back to sitting on the side of the bed? [] 1   [] 2   [x] 3   [] 4   How much help from another person does the patient currently need. .. Total A Lot A Little None   4. Moving to and from a bed to a chair (including a wheelchair)? [] 1   [] 2   [x] 3   [] 4   5. Need to walk in hospital room? [] 1   [] 2   [x] 3   [] 4   6. Climbing 3-5 steps with a railing? [] 1   [x] 2   [] 3   [] 4   © 2007, Trustees of [address] [place] [address] [address] [id number], under license to [employer]. All rights reserved      Score:  Initial: 17 Most Recent: X (Date: -- )    Interpretation of Tool:  Represents activities that are increasingly more difficult (i.e. Bed mobility, Transfers, Gait). Medical Necessity:     Skilled intervention continues to be required due to decreased functional mobility. Reason for Services/Other Comments:     Use of outcome tool(s) and clinical judgement create a POC that gives a: Clear prediction of patient's progress: LOW COMPLEXITY            TREATMENT:   (In addition to Assessment/Re-Assessment sessions the following treatments were rendered)   Pre-treatment Symptoms/Complaints:  \"I feel kind of sick\"  Pain: Initial:     0 Post Session:  0     Today's treatment session addressed Decreased Strength, Decreased ADL/Functional Activities, Decreased Transfer Abilities, Decreased Ambulation Ability/Technique, Decreased Balance, Increased Pain, Decreased Activity Tolerance, and Decreased Work Simplification/Energy Conservation Techniques to progress towards achieving goal(s) all. During this session, Occupational Therapy addressed ADLs to progress towards their discipline specific goal(s).   Co-treatment was necessary to improve patient's ability to follow higher level commands, ability to increase activity demands, and ability to return to normal functional activity. Therapeutic Exercise: (9 Minutes):  scooting, leaning, and standing to improve mobility, strength, balance, and coordination. Required moderate visual, verbal, manual, and tactile cues to promote proper body alignment, promote proper body posture, promote proper body mechanics, and promote proper body breathing techniques. Progressed complexity of movement as indicated. Braces/Orthotics/Lines/Etc:   IV  O2 Device: Nasal cannula  Treatment/Session Assessment:    Response to Treatment:  moderate fatigue, mild nausea  Interdisciplinary Collaboration:   Physical Therapist  Occupational Therapist  Registered Nurse  Physician  After treatment position/precautions:   Supine in bed  Bed alarm/tab alert on  Bed/Chair-wheels locked  Bed in low position  Call light within reach  RN notified  Family at bedside  MD at bedside   Compliance with Program/Exercises: Will assess as treatment progresses  Recommendations/Intent for next treatment session: \"Next visit will focus on advancements to more challenging activities  \".   Total Treatment Duration:  PT Patient Time In/Time Out  Time In: 1504  Time Out: Silvestre Samano.

## 2020-12-04 NOTE — PROGRESS NOTES
Patient placed on BiPAP for use QHS. No distress or discomfort noted. Will continue to monitor. 12/03/20 2141   Oxygen Therapy   O2 Sat (%) 95 %   Pulse via Oximetry 72 beats per minute   O2 Device BIPAP   FIO2 (%) 30 %   Respiratory   Respiratory (WDL) X   Respiratory Pattern Regular   Chest/Tracheal Assessment Chest expansion, symmetrical   Breath Sounds Bilateral Diminished   CPAP/BIPAP   CPAP/BIPAP Start/Stop On   Device Mode BIPAP;S/T   $$ Bipap Daily Yes   Mask Type and Size Full face; Medium   Skin Condition Intact   PIP Observed 13 cm H20   IPAP (cm H2O) 12 cm H2O   EPAP (cm H2O) 8 cm H2O   Inspiratory Time (sec) 0.9 seconds   Vt Spont (ml) 374 ml   Ve Observed (l/min) 7.8 l/min   Backup Rate 12   Total RR (Spontaneous) 20 breaths per minute   Insp Rise Time (sec) 3   Leak (Estimated) 22 L/min   Pt's Home Machine No   Biomedical Check Performed Yes   Settings Verified Yes   Alarm Settings   High Pressure 30   Low Pressure 5   Apnea 20   Low Ve 3   High Rate 40   Low Rate 5   Pulmonary Toilet   Pulmonary Toilet H. O.B elevated

## 2020-12-04 NOTE — PROGRESS NOTES
Hourly rounds completed with bed in low/locked position with call light in reach. Patient has had a new onset of confusion per patient, she felt dizzy and confused around 0450 , MD notified but patient VSS. She has removed BIPAP on her own twice this shift but placed back on 2L NC per her request. O2 saturation is 97%, she has calmed down and is currently resting in bed and all needs met. Will give report to oncoming RN.

## 2020-12-04 NOTE — PROGRESS NOTES
Jami Guzman  Admission Date: 12/2/2020             Renal Daily Progress Note: 12/4/2020    The patient's chart is reviewed and the patient is discussed with the staff.   Follow up ESRD  Subjective:   Patient seen and examined on HD, dialyzing via RUE  Qb, UF 3600 tolerating UF, + SOB- better  Labs and chart reviewed    ROS:  General: no fever/chills, appetite ok  CV: no CP  Lung: + SOB, no cough  GI: no N/V/D  Ext: + edema- improving     Current Facility-Administered Medications   Medication Dose Route Frequency    lidocaine-prilocaine (EMLA) 2.5-2.5 % cream   Topical PRN    calcium acetate (phosphate binder) (PHOSLO) tablet 1,334 mg  1,334 mg Oral TID WITH MEALS    clopidogreL (PLAVIX) tablet 75 mg  75 mg Oral DAILY    cyanocobalamin tablet 1,000 mcg  1,000 mcg Oral DAILY    docusate sodium (COLACE) capsule 100 mg  100 mg Oral DAILY    B complex-vitamin C-folic acid (NEPHRO-WAQAR) 0.8 mg tab  1 Tab Oral DAILY    gabapentin (NEURONTIN) capsule 200 mg  200 mg Oral TID    insulin glargine (LANTUS) injection 25 Units  25 Units SubCUTAneous QHS PRN    levothyroxine (SYNTHROID) tablet 200 mcg  200 mcg Oral ACB    pantoprazole (PROTONIX) tablet 40 mg  40 mg Oral ACB    rosuvastatin (CRESTOR) tablet 20 mg  20 mg Oral QHS    allopurinoL (ZYLOPRIM) tablet 100 mg  100 mg Oral DAILY    sodium chloride (NS) flush 5-40 mL  5-40 mL IntraVENous Q8H    sodium chloride (NS) flush 5-40 mL  5-40 mL IntraVENous PRN    acetaminophen (TYLENOL) tablet 650 mg  650 mg Oral Q6H PRN    Or    acetaminophen (TYLENOL) suppository 650 mg  650 mg Rectal Q6H PRN    polyethylene glycol (MIRALAX) packet 17 g  17 g Oral DAILY PRN    heparin (porcine) injection 5,000 Units  5,000 Units SubCUTAneous Q8H    dextrose 40% (GLUTOSE) oral gel 1 Tube  15 g Oral PRN    glucagon (GLUCAGEN) injection 1 mg  1 mg IntraMUSCular PRN    dextrose (D50W) injection syrg 12.5-25 g  25-50 mL IntraVENous PRN Objective:     Vitals:    12/04/20 0538 12/04/20 0739 12/04/20 1203 12/04/20 1246   BP: (!) 150/77 (!) 162/79 (!) 149/62 (!) 155/50   Pulse:  89 79 74   Resp:  18 18    Temp:  97.7 °F (36.5 °C) 98 °F (36.7 °C)    SpO2:  95% 96%    Weight:       Height:         Intake and Output:   12/02 1901 - 12/04 0700  In: -   Out: 3000   12/04 0701 - 12/04 1900  In: 600 [P.O.:600]  Out: -     Physical Exam:   Constitutional:  the patient is well developed and in no acute distress, alert and oriented   HEENT:  Sclera clear, pupils equal, oral mucosa moist  Lungs: clear with low lung volumes bilaterally   Cardiovascular:  Regular rate, S1, S2, no Rub   Abd/GI: soft and non-tender; with positive bowel sounds. Ext: warm without cyanosis. There is +1 lower leg edema. Skin:  no jaundice or rashes   Neuro: no gross neuro deficits   Psychiatric: Calm. Access: RUE AVG cannulated       LAB  Recent Labs     12/04/20  0700 12/03/20  0615 12/02/20  1008   WBC 6.7 7.4 11.5*   HGB 9.1* 9.0* 9.7*   HCT 29.5* 30.1* 31.9*   * 135* 148*     Recent Labs     12/04/20  0700 12/03/20  0615 12/02/20  1153 12/02/20  1008    137*  --  135*   K 3.7 4.8 6.1* 6.1*    100  --  99   CO2 29 26  --  23   * 65  --  102*   BUN 23 52*  --  92*   CREA 5.72* 8.81*  --  13.00*   MG  --  2.1  --  2.4   PHOS  --   --   --  6.7*   ALB  --  3.1*  --  3.1*     No results for input(s): PH, PCO2, PO2, HCO3 in the last 72 hours.       Assessment:  (Medical Decision Making)     Hospital Problems  Date Reviewed: 9/24/2020          Codes Class Noted POA    * (Principal) Fluid overload ICD-10-CM: E87.70  ICD-9-CM: 276.69  12/2/2020 Yes        Chronic respiratory failure with hypoxia and hypercapnia [facility]) ICD-10-CM: J96.11, J96.12  ICD-9-CM: 518.83, 799.02, 786.09  2/27/2018 Yes        ESRD (end stage renal disease) on dialysis [facility]) ICD-10-CM: N18.6, Z99.2  ICD-9-CM: 585.6, V45.11  7/20/2017 Yes        Chronic diastolic heart failure ([facility] ICD-10-CM: I50.32  ICD-9-CM: 428.32  Unknown Yes        Coronary atherosclerosis of native coronary vessel ICD-10-CM: I25.10  ICD-9-CM: 414.01  Unknown Yes        ESRD on hemodialysis ([facility] (Chronic) ICD-10-CM: N18.6, Z99.2  ICD-9-CM: 585.6, V45.11  8/28/2014 Yes        CASSIE (obstructive sleep apnea) (Chronic) ICD-10-CM: G47.33  ICD-9-CM: 327.23  7/16/2014 Yes    Overview Signed 7/16/2014  1:18 AM by Angela Villalta NP     BIPAP             DM (diabetes mellitus) ([facility] (Chronic) ICD-10-CM: E11.9  ICD-9-CM: 250.00  7/16/2014 Yes        Hypothyroidism ICD-10-CM: E03.9  ICD-9-CM: 244.9  6/25/2014 Yes        Morbid obesity ([facility]) (Chronic) ICD-10-CM: E66.01  ICD-9-CM: 278.01  6/19/2014 Yes              Plan:  (Medical Decision Making)   1) ESRD- Delfin NORMAN  -seen on HD, tolerating dialysis, access functioning well   -EDW 91 kg  -ok to discharge from renal standpoint     2) Volume- noted lower extremity edema as well as pulmonary edema.   continue UF with HD, daily weights    3) Hyperkalemia-  improved with HD    4) Hyperphos- on home binders    5) Anemia-  RODRIGO per outpatient clinic    Abel Hidalgo NP

## 2020-12-04 NOTE — DISCHARGE INSTRUCTIONS
Patient Education        Learning About Fluid Overload  What is fluid overload? Fluid overload means that your body has too much water. The extra fluid in your body can raise your blood pressure and force your heart to work harder. It can also make it hard for you to breathe. Most of your body is made up of water. The body uses minerals like sodium and potassium to help organs such as your heart, kidneys, and liver balance how much water you need. For example, the heart pumps blood to move water around the body. And the kidneys work to get rid of the water that the body doesn't need. Health conditions like kidney disease, heart failure, and cirrhosis can cause fluid overload. Other things can cause extra fluid to build up. IV fluids, some medicines, too much salt (sodium) from food, and certain medical treatments can sometimes cause this fluid increase. What are the symptoms? Some of the most common symptoms are:  · Gaining weight over a short period of time. · Swelling in the ankles or legs. · Shortness of breath. How is it treated? The goal of treatment is to remove the extra fluid in your body. Your treatment will depend on the cause. Your doctor may:  · Give you medicines, such as diuretics (also called \"water pills\"). They help your body get rid of the extra fluid. · Recommend that you limit sodium. Follow-up care is a key part of your treatment and safety. Be sure to make and go to all appointments, and call your doctor if you are having problems. It's also a good idea to know your test results and keep a list of the medicines you take. Where can you learn more? Go to http://www.gray.com/  Enter O110 in the search box to learn more about \"Learning About Fluid Overload. \"  Current as of: December 16, 2019               Content Version: 12.6  © 7032-9630 [employer], [employer].    Care instructions adapted under license by [employer] (which disclaims liability or warranty for this information). If you have questions about a medical condition or this instruction, always ask your healthcare professional. [place] [address] any warranty or liability for your use of this information.

## 2020-12-04 NOTE — PROGRESS NOTES
Care Management Interventions  PCP Verified by CM: Yes  Mode of Transport at Discharge: Self  Transition of Care Consult (CM Consult): Discharge Planning  Current Support Network: Shelter, Lives with Spouse  Confirm Follow Up Transport: Family  Discharge Location  Discharge Placement: Home with family assistance      Patient has been off the floor in dialysis this afternoon. CM spoke with patient  who stated they do not need transport home he will get patient home. Patient  also stated they do not want HH as the last time [facility] did not do much and didn't help her anyway. All milestones for discharge have been met. Patient will transport home with her .

## 2020-12-04 NOTE — DIALYSIS
TRANSFER OUT -DIALYSIS    Hemodialysis treatment completed without complications. Patient alert and VS stable  /47   P 87       3 Kgs removed. Needles X2 removed from access and manual pressure held until hemostasis complete and pressure dressing applied. Meds given 0. 0 units of RBCs given during dialysis. Patient to 604 after dialysis.

## 2020-12-04 NOTE — DISCHARGE SUMMARY
Hospitalist Discharge Summary     Admit Date:  2020 10:07 AM   Name:  Charlie Tolbert   Age:  70 y.o.  :  1945   MRN:  [id number]   PCP:  Sandrita Drake MD  Treatment Team: Attending Provider: Reji You MD; Consulting Provider: Reji You MD; Utilization Review: Awa Gonzalez; Care Manager: Stephanie Iniguez RN; Charge Nurse: Anam Gee RN    Problem List for this Hospitalization:  Hospital Problems as of 2020 Date Reviewed: 2020          Codes Class Noted - Resolved POA    * (Principal) Fluid overload ICD-10-CM: E87.70  ICD-9-CM: 276.69  2020 - Present Yes        Chronic respiratory failure with hypoxia and hypercapnia [facility]) ICD-10-CM: J96.11, J96.12  ICD-9-CM: 518.83, 799.02, 786.09  2018 - Present Yes        ESRD (end stage renal disease) on dialysis [facility]) ICD-10-CM: N18.6, Z99.2  ICD-9-CM: 585.6, V45.11  2017 - Present Yes        Chronic diastolic heart failure ([facility] ICD-10-CM: I50.32  ICD-9-CM: 428.32  Unknown - Present Yes        Coronary atherosclerosis of native coronary vessel ICD-10-CM: I25.10  ICD-9-CM: 414.01  Unknown - Present Yes        ESRD on hemodialysis ([facility] (Chronic) ICD-10-CM: N18.6, Z99.2  ICD-9-CM: 585.6, V45.11  2014 - Present Yes        CASSIE (obstructive sleep apnea) (Chronic) ICD-10-CM: G47.33  ICD-9-CM: 327.23  2014 - Present Yes    Overview Signed 2014  1:18 AM by PARESH Hill             DM (diabetes mellitus) ([facility] (Chronic) ICD-10-CM: E11.9  ICD-9-CM: 250.00  2014 - Present Yes        Hypothyroidism ICD-10-CM: E03.9  ICD-9-CM: 244.9  2014 - Present Yes        Morbid obesity ([facility] (Chronic) ICD-10-CM: E66.01  ICD-9-CM: 278.01  2014 - Present Yes                Admission HPI from 2020:    \" Ms. Jamie Gonzalez is a 71 yo female with PMH of acute on chronic resp failure, anemia, CVA, PE, CHF, ESRD on HD, cirrhosis, DM II, CASSIE in [place] who presented with c/o generalized weakness.  Missed 2 sessions of HD due to \"feeling bad\".  Dialyzes MWF in [facility]. [facility] White session Friday Nov 27. She was found to have hyperkalemia, fluid overload.  Nephrology consulted. Pt taken to HD from ER. Also c/o chest pain left chest, non radiating, reproducible on palpation of left chest.   She is drowsy but oriented X3.  She is anuric.  Denies SOB, cough, fever, chills, sick contact, loss of taste or smell, n/v/d. \"    Hospital Course:  She was admitted for fluid overload. Nephrology was consulted and she had three days in a row of hemodialysis. Her weakness improved while here. She is hemodynamically stable for discharge to home. Follow up instructions below. Plan was discussed with patient. All questions answered. Patient was stable at time of discharge and was instructed to call or return if there are any concerns or recurrence of symptoms. Diagnostic Imaging/Tests:   Xr Chest Pa Lat    Result Date: 12/2/2020  Clinical History: The Female patient is 70years old  presenting with symptoms of Chest Pain. Comparison:  Chest x-ray 12/2/2020 Findings:  Frontal and lateral views of the chest were obtained. Diffuse mild interstitial vascular congestion is demonstrated. No pleural effusions are seen. The cardiomediastinal silhouette remains enlarged. There are no acute osseous abnormalities. Impression:  1. Cardiomegaly with continued diffuse mild interstitial vascular congestion. CPT code(s) 24692     Xr Chest Port    Result Date: 12/2/2020  CHEST X-RAY, one view. HISTORY:  Chest pain and weakness. TECHNIQUE:  AP portable semiupright view. COMPARISON: December 2017 FINDINGS: Lungs: Exam suboptimal due to body habitus and mildly lordotic projection. Left lower lobe not well seen. . Otherwise lungs are clear. Costophrenic angles: Obscured on the left. Heart size: Probably normal. Pulmonary vasculature: is unremarkable. Aorta: Calcifications.  Included portion of the upper abdomen: is unremarkable. Bones: No gross bony lesions. Other: None. IMPRESSION:  Suboptimal exam due to body habitus and positioning. Infiltrate or atelectasis left base cannot be excluded. .       Echocardiogram results:  No results found for this visit on 12/02/20.       All Micro Results     None          Labs: Results:       BMP, Mg, Phos Recent Labs     12/04/20  0700 12/03/20  0615 12/02/20  1153 12/02/20  1008    137*  --  135*   K 3.7 4.8 6.1* 6.1*    100  --  99   CO2 29 26  --  23   AGAP 7 11  --  13   BUN 23 52*  --  92*   CREA 5.72* 8.81*  --  13.00*   CA 8.6 8.5  --  9.0   * 65  --  102*   MG  --  2.1  --  2.4   PHOS  --   --   --  6.7*      CBC Recent Labs     12/04/20  0700 12/03/20  0615 12/02/20  1008   WBC 6.7 7.4 11.5*   RBC 2.69* 2.70* 2.89*   HGB 9.1* 9.0* 9.7*   HCT 29.5* 30.1* 31.9*   * 135* 148*   GRANS  --  76 80*   LYMPH  --  13 12*   EOS  --  4 2   MONOS  --  6 4   BASOS  --  1 1   IG  --  1 1   ANEU  --  5.7 9.2*   ABL  --  0.9 1.4   ADEBAYO  --  0.3 0.3   ABM  --  0.4 0.5   ABB  --  0.1 0.1   AIG  --  0.1 0.1      LFT Recent Labs     12/03/20  0615 12/02/20  1008   ALT 9* 12   * 161*   TP 7.4 7.9   ALB 3.1* 3.1*   GLOB 4.3* 4.8*   AGRAT 0.7* 0.6*      Cardiac Testing Lab Results   Component Value Date/Time    BNP 1,281 12/06/2017 07:15 AM     07/03/2017 05:38 AM     06/30/2017 05:12 PM     07/15/2014 12:30 PM     12/28/2012 05:15 AM     12/11/2012 05:20 AM    CK 76 12/06/2017 07:15 AM    Troponin-I, Qt. 0.33 (HH) 12/06/2017 07:15 AM    Troponin-I, Qt. 0.03 07/01/2017 06:19 AM    Troponin-I, Qt. 0.03 06/30/2017 05:12 PM      Coagulation Tests Lab Results   Component Value Date/Time    Prothrombin time 27.9 (H) 05/01/2018 11:37 AM    Prothrombin time 25.8 (H) 03/13/2018 02:05 PM    Prothrombin time 26.8 (H) 12/19/2017 05:08 AM    INR 2.7 05/01/2018 11:37 AM    INR 2.4 03/13/2018 02:05 PM    INR 2.5 12/19/2017 05:08 AM    INR (POC) 1.2 02/09/2018 09:18 AM    INR (POC) 1.2 01/10/2018 10:29 AM    INR, (POC) 1.7 (A) 04/02/2018 09:20 AM    INR, (POC) 8.0 (A) 03/27/2018 07:07 PM    INR, (POC) 8.0 (A) 03/20/2018 02:25 PM    INR POC 1.4 (A) 03/26/2018 08:22 AM    INR POC 5.8 (A) 03/23/2018 09:50 AM    aPTT 58.3 (H) 12/19/2017 05:08 AM    aPTT 100.4 (HH) 12/18/2017 06:15 AM    aPTT 84.3 (H) 12/17/2017 06:55 AM      A1c Lab Results   Component Value Date/Time    Hemoglobin A1c 4.9 07/01/2017 06:19 AM    Hemoglobin A1c 6.7 (H) 06/26/2014 05:50 AM      Lipid Panel Lab Results   Component Value Date/Time    Cholesterol, total 192 08/29/2014 05:28 AM    HDL Cholesterol 30 (L) 08/29/2014 05:28 AM    LDL, calculated 103.2 (H) 08/29/2014 05:28 AM    VLDL, calculated 58.8 (H) 08/29/2014 05:28 AM    Triglyceride 294 (H) 08/29/2014 05:28 AM    CHOL/HDL Ratio 6.4 08/29/2014 05:28 AM      Thyroid Panel Lab Results   Component Value Date/Time    TSH 1.170 07/01/2017 06:19 AM    TSH 0.965 08/28/2014 01:54 PM        Most Recent UA Lab Results   Component Value Date/Time    Color YELLOW 08/29/2014 02:19 AM    Appearance CLEAR 08/29/2014 02:19 AM    Specific gravity 1.009 08/29/2014 02:19 AM    pH (UA) 6.5 08/29/2014 02:19 AM    Protein 100 (A) 08/29/2014 02:19 AM    Glucose 100 08/29/2014 02:19 AM    Ketone NEGATIVE  08/29/2014 02:19 AM    Bilirubin NEGATIVE  08/29/2014 02:19 AM    Blood TRACE (A) 08/29/2014 02:19 AM    Urobilinogen 0.2 08/29/2014 02:19 AM    Nitrites NEGATIVE  08/29/2014 02:19 AM    Leukocyte Esterase NEGATIVE  08/29/2014 02:19 AM        Allergies   Allergen Reactions    Codeine Other (comments)     Pt can not recall reaction    Oxycodone Not Reported This Time     Patient denies allergy to medication     Immunization History   Administered Date(s) Administered    Hep B Vaccine (Adult) 10/21/2005, 11/21/2005    Influenza Vaccine 11/19/2001, 09/23/2009, 10/20/2011, 10/01/2017, 10/01/2017, 10/01/2018    Pneumococcal Conjugate (PCV-13) 12/04/2018  Pneumococcal Polysaccharide (PPSV-23) 10/15/2013    TB Skin Test (PPD) Intradermal 06/23/2014, 07/19/2014, 06/30/2017, 12/11/2017    Td, Adsorbed 10/21/2005       All Labs from Last 24 Hrs:  Recent Results (from the past 24 hour(s))   GLUCOSE, POC    Collection Time: 12/03/20  3:54 PM   Result Value Ref Range    Glucose (POC) 120 (H) 65 - 100 mg/dL   GLUCOSE, POC    Collection Time: 12/04/20  4:48 AM   Result Value Ref Range    Glucose (POC) 155 (H) 65 - 135 mg/dL   METABOLIC PANEL, BASIC    Collection Time: 12/04/20  7:00 AM   Result Value Ref Range    Sodium 137 136 - 145 mmol/L    Potassium 3.7 3.5 - 5.1 mmol/L    Chloride 101 98 - 107 mmol/L    CO2 29 21 - 32 mmol/L    Anion gap 7 7 - 16 mmol/L    Glucose 128 (H) 65 - 100 mg/dL    BUN 23 8 - 23 MG/DL    Creatinine 5.72 (H) 0.6 - 1.0 MG/DL    GFR est AA 9 (L) >60 ml/min/1.73m2    GFR est non-AA 8 (L) >60 ml/min/1.73m2    Calcium 8.6 8.3 - 10.4 MG/DL   CBC W/O DIFF    Collection Time: 12/04/20  7:00 AM   Result Value Ref Range    WBC 6.7 4.3 - 11.1 K/uL    RBC 2.69 (L) 4.05 - 5.2 M/uL    HGB 9.1 (L) 11.7 - 15.4 g/dL    HCT 29.5 (L) 35.8 - 46.3 %    .7 (H) 79.6 - 97.8 FL    MCH 33.8 (H) 26.1 - 32.9 PG    MCHC 30.8 (L) 31.4 - 35.0 g/dL    RDW 15.8 (H) 11.9 - 14.6 %    PLATELET 223 (L) 847 - 450 K/uL    MPV 9.4 9.4 - 12.3 FL    ABSOLUTE NRBC 0.00 0.0 - 0.2 K/uL       Discharge Exam:  Patient Vitals for the past 24 hrs:   Temp Pulse Resp BP SpO2   12/04/20 0739 97.7 °F (36.5 °C) 89 18 (!) 162/79 95 %   12/04/20 0538    (!) 150/77    12/04/20 0356 97.7 °F (36.5 °C) 80 20 (!) 151/89 100 %   12/03/20 2240 97.3 °F (36.3 °C) 88 25 (!) 167/91 96 %   12/03/20 2141     95 %   12/03/20 1853 98 °F (36.7 °C) 77 17 (!) 154/67 97 %   12/03/20 1557 98.4 °F (36.9 °C) 78 18 (!) 156/80 97 %   12/03/20 1344  77  (!) 170/69    12/03/20 1248  75  (!) 178/65    12/03/20 1223  74  (!) 172/79    12/03/20 1157  74  (!) 177/78      Oxygen Therapy  O2 Sat (%): 95 % (12/04/20 0739)  Pulse via Oximetry: 72 beats per minute (12/03/20 2141)  O2 Device: BIPAP (12/03/20 2141)  O2 Flow Rate (L/min): 2 l/min (12/03/20 1557)  FIO2 (%): 30 % (12/03/20 2141)    Intake/Output Summary (Last 24 hours) at 12/4/2020 1140  Last data filed at 12/4/2020 0740  Gross per 24 hour   Intake 360 ml   Output 3000 ml   Net -2640 ml       General:    Well nourished. Alert. No distress. On 2L NC oxygen (chronic)  Eyes:   Normal sclera. Extraocular movements intact. ENT:  Normocephalic, atraumatic. Moist mucous membranes  CV:   Regular rate and rhythm. No murmur, rub, or gallop. Lungs:  Clear to auscultation bilaterally. No wheezing, rhonchi, or rales. Abdomen: Soft, nontender, nondistended. Bowel sounds normal.   Extremities: Warm and dry. No cyanosis. Chronic lower extremity edema  Neurologic:  Sensation intact. Skin:     No rashes or jaundice. Psych:  Normal mood and affect. Discharge Info:   Current Discharge Medication List      CONTINUE these medications which have NOT CHANGED    Details   calcium acetate,phosphat bind, (PHOSLO) 667 mg cap Take 2 Caps by mouth three (3) times daily (with meals). folic acid/vit B complex and C (LINDA-WAQAR PO) Take 1 Tab by mouth daily. fludrocortisone (FLORINEF) 0.1 mg tablet Take 0.1 mg by mouth nightly. rosuvastatin (CRESTOR) 20 mg tablet Take 1 Tab by mouth nightly. Qty: 90 Tab, Refills: 3      pantoprazole (Protonix) 40 mg tablet Take 40 mg by mouth every morning. insulin glargine (LANTUS,BASAGLAR) 100 unit/mL (3 mL) inpn 25 Units by SubCUTAneous route nightly as needed. Takes for BS greater than 130-      docusate sodium (COLACE) 100 mg capsule Take 100 mg by mouth as needed. phenyleph-shark marcell oil-mo-pet (HEMORRHOID) rectal ointment by PeriANAL route every six (6) hours as needed for Hemorrhoids. midodrine (PROAMITINE) 10 mg tablet Take 10 mg by mouth two (2) times a day.       levothyroxine (SYNTHROID) 200 mcg tablet Take 200 mcg by mouth Daily (before breakfast). lidocaine-prilocain-0.9 % NaCl 2.5 % -2.5 % kit by Does Not Apply route. Apply to left inner forearm topically one time a day every MWF for numbing dialysis access. clopidogrel (PLAVIX) 75 mg tablet Take 75 mg by mouth every morning. Per Wing Jain at [facility] patient - continue Plavix prior to declot 11/25/20      allopurinol (ZYLOPRIM) 100 mg tablet Take 100 mg by mouth every morning.      gabapentin (Neurontin) 100 mg capsule Take 200 mg by mouth three (3) times daily. Indications: neuropathic pain      cyanocobalamin (VITAMIN B-12) 1,000 mcg tablet Take 1,000 mcg by mouth every morning. Disposition: home    Activity: Activity as tolerated  Diet: DIET RENAL Regular    Follow-up Appointments   Procedures    FOLLOW UP VISIT Appointment in: Two Weeks Follow up with PCP in 2 weeks for hospital follow up for fluid overload due to missing dialysis. Follow up with PCP in 2 weeks for hospital follow up for fluid overload due to missing dialysis. Standing Status:   Standing     Number of Occurrences:   1     Order Specific Question:   Appointment in     Answer: Two Weeks         Follow-up Information     Follow up With Specialties Details Why Contact Info    Mariann White MD  In 2 weeks hospital follow up for weakness/fluid overload [address] [place] [address] [phone]            Time spent in patient discharge planning and coordination 35 minutes.     Signed:  Brinda Butts MD

## 2020-12-04 NOTE — DIALYSIS
TRANSFER IN - DIALYSIS    Received patient in dialysis unit from [facility] (unit) for ordered procedure. Consent verified for renal replacement therapy. Patient alert and vital signs stable. /64 P73    Hemodialysis initiated using right AVG and 15 g needles. Machine settings per MD order. Will monitor during treatment.

## 2020-12-04 NOTE — PROGRESS NOTES
Problem: Falls - Risk of  Goal: *Absence of Falls  Description: Document Carlos Going Fall Risk and appropriate interventions in the flowsheet. Outcome: Progressing Towards Goal  Note: Fall Risk Interventions:  Mobility Interventions: Bed/chair exit alarm, Patient to call before getting OOB         Medication Interventions: Evaluate medications/consider consulting pharmacy, Patient to call before getting OOB, Teach patient to arise slowly    Elimination Interventions: Call light in reach              Problem: Patient Education: Go to Patient Education Activity  Goal: Patient/Family Education  Outcome: Progressing Towards Goal     Problem: Pressure Injury - Risk of  Goal: *Prevention of pressure injury  Description: Document Remington Scale and appropriate interventions in the flowsheet.   Outcome: Progressing Towards Goal  Note: Pressure Injury Interventions:       Moisture Interventions: Absorbent underpads    Activity Interventions: PT/OT evaluation, Increase time out of bed    Mobility Interventions: Assess need for specialty bed, Pressure redistribution bed/mattress (bed type), PT/OT evaluation    Nutrition Interventions: Document food/fluid/supplement intake    Friction and Shear Interventions: Apply protective barrier, creams and emollients, Feet elevated on foot rest

## 2020-12-07 NOTE — PROGRESS NOTES
Patient was admitted to [facility] on  and discharged on  for fluid overload. Outreach made within 2 business days of discharge: Yes    Top Discharge Challenges to be reviewed by the provider   Additional needs identified to be addressed with provider no  none  Discussed COVID-19 related testing which was not done at this time. Test results were not done. Patient informed of results, if available? na   Method of communication with provider : none       Advance Care Planning:   Does patient have an Advance Directive:  yes; reviewed and current     Inpatient Readmission Risk score: 9%  Was this a readmission? yes   Patient stated reason for the admission: missed HDx2  Patients top risk factors for readmission: medical condition  Interventions to address risk factors: landon, PCP follow up , HD follow up    Care Transition Nurse (CTN) contacted the family by telephone to perform post hospital discharge assessment. Verified name and  with family as identifiers. Provided introduction to self, and explanation of the CTN role. CTN reviewed discharge instructions, medical action plan and red flags with family who verbalized understanding. Family given an opportunity to ask questions and does not have any further questions or concerns at this time. The family agrees to contact the PCP office for questions related to their healthcare. Medication reconciliation was performed with family, who verbalizes understanding of administration of home medications. Advised obtaining a 90-day supply of all daily and as-needed medications.    Referral to Pharm D needed: no     Home Health/Outpatient orders at discharge: [address]: na  Date of initial visit: na    Durable Medical Equipment ordered at discharge: [address] received: na    Patient/family/caregiver given information for [facility] and agrees to enroll no  Patient's preferred e-mail: declines  Patient's preferred phone number: declines    Discussed follow-up appointments. If no appointment was previously scheduled, appointment scheduling offered: yes  [facility] follow up appointment(s):   Future Appointments   Date Time Provider Judah Armida   3/18/2021  2:30 PM Ashlee Marshall MD [facility]     [facility] follow up appointment(s): Dr Tra Sterling 12/8  no current questions or concerns. No request for follow up. CTN gave contact number for any questions/concerns that may arise based on severity of symptoms and risk factors. CTN provided contact information for future needs. Goals Addressed                 This Visit's Progress     Patient/Family verbalizes understanding of self-management of chronic disease. On track     Assess barriers to safe and effective d/c AEB    Patient able to obtain medicine after d/c    Patient able to verbalize medicine changes    Patient is aware and attends follow up appointments s/p d/c.

## 2020-12-19 NOTE — PROGRESS NOTES
Physician Progress Note Elis Gibbons 
CSN #:                  [id number] :                       1945 ADMIT DATE:       2020 10:07 AM 
[address] [place] DATE:        2020 5:38 PM 
RESPONDING 
PROVIDER #:        Paige Hinton MD 
 
 
 
 
QUERY TEXT: 
 
Pt admitted with hyperkalemia and fluid overload. Patient noted to have ESRD. Documented patient missed 2 sessions of HD. Documentation of pulmonary edema. After study, was patient being treated for one of the following: The medical record reflects the following: 
Risk Factors: ESRD, history of chronic diastolic heart failure Clinical Indicators: Documentation of missed two sessions of HD. CXR with pulmonary edema. Treatment: Nephrology consult. HD Thank You, Francis Hayes RN [facility] Options provided: 
-- Acute non cardiogenic  pulmonary edema due to ESRD and missed HD 
-- Fluid overload 
-- Other - I will add my own diagnosis -- Disagree - Not applicable / Not valid -- Disagree - Clinically unable to determine / Unknown 
-- Refer to Clinical Documentation Reviewer PROVIDER RESPONSE TEXT: 
 
This patient has acute non cardiogenic pulmonary edema due to ESRD and missed HD . Query created by: Abigail Mack on 2020 10:54 AM 
 
 
Electronically signed by:  Paige Hinton MD 2020 7:01 AM

## 2020-12-29 NOTE — ED NOTES
I have reviewed discharge instructions with the patient. The patient verbalized understanding. Patient left ED via Discharge Method: stretcher to Dialysis with [facility] . Opportunity for questions and clarification provided. Patient given 0 scripts. To continue your aftercare when you leave the hospital, you may receive an automated call from our care team to check in on how you are doing. This is a free service and part of our promise to provide the best care and service to meet your aftercare needs.  If you have questions, or wish to unsubscribe from this service please call [phone]. Thank you for Choosing our [address] [place] [address] Emergency Department.

## 2020-12-29 NOTE — ED TRIAGE NOTES
Pt arrived via [facility] from home c/o NVD that began this AM and weakness all over since last Thursday. Pt has not been to dialysis since last Thursday and per the clinic, pt has to have a COVID test prior to dialysis. Pt on 2l NC all the time. Denies fever, shob, cp. Reports clear productive cough

## 2020-12-29 NOTE — ED PROVIDER NOTES
Patient is a 30-year-old female with multiple medical problems and chronic end-stage renal disease on hemodialysis who comes to the emergency department today via EMS from her home.  assists with the history as patient is a very poor historian. She states she is feeling weak and fatigued for about 1 week. She has missed her last 2 hemodialysis treatments.  states she is also been confused and very sleepy the past couple of days. This morning she started with nonbloody diarrhea. The history is provided by the patient and the spouse. Fatigue This is a new problem. The current episode started more than 2 days ago. The problem has been gradually worsening. There was no focality noted. Pertinent negatives include no focal weakness. There has been no fever. Pertinent negatives include no shortness of breath, no chest pain, no vomiting and no nausea. There were no medications administered prior to arrival. Associated medical issues do not include trauma or seizures. Past Medical History:  
Diagnosis Date  Acute on chronic respiratory failure ([address] 11/16/2017  Anemia of chronic disease  ARF (acute respiratory failure) ([address] 12/19/2017  Arthritis   
 arthritis OA - generalized multiple joints - pt says \"not really any problems\" (6/21/16)  Bleeds easily ([address]   
 takes Plavix for hx of 2 strokes and PE  
 CHF (congestive heart failure) ([address] 11/16/2017 Ef 40 per cardiology note 9/2020  Chronic diastolic heart failure ([address]  Chronic kidney disease  Chronic pain   
 pt denies this  Chronic respiratory failure ([address]  Cirrhosis ([address]   
 per patient dx 2019  Coagulation disorder ([address]   
 pernicious anemia chronic - tx with procrit  Coronary atherosclerosis of native coronary vessel   
 no stents or interventions, denies mi  Current use of long term anticoagulation Plavix  Diabetic neuropathy ([address]  Dialysis patient [facility]) M/W/F in [facility] as  Renal- access in right arm  Difficulty walking   
 ambulates with walker  ESRD (end stage renal disease) on dialysis ([address] [facility] MWF since 2014  Fibromyalgia  GERD (gastroesophageal reflux disease)   
 managed well with Protonix  Gout   
 managed well with Allopurinol  Hemodialysis access site with mature fistula ([address] 8/27/14  
 permanent AV site left forearm placed  Hemodialysis access, AV graft ([address] 9/9/2014 8/27/14 (Dr Chet Hummel) Brachial artery antecubital forearm loop fistula.  Hepatomegaly   
 not sure why this is here- per pt  
 History of CVA (cerebrovascular accident) X2- slight right sided weakness- blind Right eye  History of echocardiogram   
 12/12/17- Atrial septum: milr right to left shunt induced by valsalva maneuver  History of stroke 8/28/2014 Previous right residual   
 Hypercholesterolemia  Hypotension 12/16/2017  Hypotension   
 midodrone b.i.d.  
 Hypothyroidism approx 2000  
 controlled with meds  Lupus ([address]   
 systemic  Morbid obesity ([address] 06/21/2016 BMI 36.6  NSVT (nonsustained ventricular tachycardia) ([address] 6/19/2014  Orthostatic hypotension Managed well with   
 CASSIE (obstructive sleep apnea) 06/21/2016 BIPAP  PE (pulmonary thromboembolism) ([address] 2017  Poor historian 6/21/16  
 probably related to CVA in 2014  Sepsis ([address] 6/19/2014 UNCLEAR ETIOLOGY  Stroke ([address]   
 x 2- last stroke 2016- lost vision R eye  Supplemental oxygen dependent 2LPM via NC  Type 2 diabetes mellitus ([address]   
 insulin /AVG BS:130 / A1C 5.7 per patient (9/2020)hypo s/s below 80  
 Unable to bear weight 8/28/2014  Unspecified sleep apnea 11/13 bi-pap  Vertigo as late effect of stroke 6/21/16  Weakness of both lower limbs 8/28/2014 Past Surgical History:  
Procedure Laterality Date  COLONOSCOPY N/A 6/23/2016 COLONOSCOPY  BMI 37 performed by Trisha Ortiz MD at [facility] ENDOSCOPY  COLONOSCOPY N/A 8/27/2019 COLONOSCOPY/BMI 35 performed by Yanira Sullivan MD at [facility] ENDOSCOPY  
 HX COLONOSCOPY  2020  HX ERCP    
 HX GI    
 insertion of Peritoneal port RLQ of abdomen  HX GI    
 removal of peritoneal port  HX HYSTERECTOMY  1975  
 unknown ovaries  HX LAP CHOLECYSTECTOMY  2009  HX VASCULAR ACCESS  2013 and 4  
 multiple temporaries  HX VASCULAR ACCESS  8/27/14  
 permanent placement left forearm  HX VASCULAR ACCESS Subclavian port in R chest wall (active)  VASCULAR SURGERY PROCEDURE UNLIST  8-27-14 L Brachial artery antecubital forearm loop fistula  VASCULAR SURGERY PROCEDURE UNLIST    
 L UA AV shunt placement (inactive)  VASCULAR SURGERY PROCEDURE UNLIST Left 01/10/2018  
 fistulogram/venogram  
 VASCULAR SURGERY PROCEDURE UNLIST Right 02/09/2018 AVG  VASCULAR SURGERY PROCEDURE UNLIST Left 05/01/2018 Ligation of  AV graft  VASCULAR SURGERY PROCEDURE UNLIST Right 07/2020 DECLOT RIGHT FOREARM AV GRAFT (Right Arm Family History:  
Problem Relation Age of Onset  Heart Disease Mother  Diabetes Mother  Lung Disease Mother  Heart Disease Father  Stroke Father  Diabetes Father  Cancer Sister  Diabetes Sister  Heart Disease Sister Social History Socioeconomic History  Marital status:  Spouse name: Not on file  Number of children: Not on file  Years of education: Not on file  Highest education level: Not on file Occupational History  Not on file Social Needs  Financial resource strain: Not on file  Food insecurity Worry: Not on file Inability: Not on file  Transportation needs Medical: Not on file Non-medical: Not on file Tobacco Use  Smoking status: Never Smoker  Smokeless tobacco: Never Used Substance and Sexual Activity  Alcohol use:  No  
  Drug use: No  
 Sexual activity: Never Lifestyle  Physical activity Days per week: Not on file Minutes per session: Not on file  Stress: Not on file Relationships  Social connections Talks on phone: Not on file Gets together: Not on file Attends [employer] service: Not on file Active member of club or organization: Not on file Attends meetings of clubs or organizations: Not on file Relationship status: Not on file  Intimate partner violence Fear of current or ex partner: Not on file Emotionally abused: Not on file Physically abused: Not on file Forced sexual activity: Not on file Other Topics Concern  Not on file Social History Narrative  and lives with her . Disabled, but previously worked as a . ALLERGIES: Codeine and Oxycodone Review of Systems Constitutional: Positive for fatigue. Negative for chills and fever. HENT: Negative for congestion, rhinorrhea and sore throat. Eyes: Negative for pain, discharge and visual disturbance. Respiratory: Negative for cough and shortness of breath. Cardiovascular: Negative for chest pain and palpitations. Gastrointestinal: Negative for abdominal pain, diarrhea, nausea and vomiting. Endocrine: Negative for polydipsia and polyuria. Musculoskeletal: Negative for back pain and neck pain. Skin: Positive for pallor. Negative for rash. Neurological: Positive for weakness. Negative for focal weakness, seizures and syncope. Hematological: Negative. Vitals:  
 12/29/20 1026 BP: (!) 160/77 Pulse: 87 Resp: 16 Temp: 98.2 °F (36.8 °C) SpO2: 96% Weight: 90.7 kg (200 lb) Height: 5' 3\" (1.6 m) Physical Exam 
Vitals signs and nursing note reviewed. Constitutional:   
   Appearance: Normal appearance. She is well-developed. She is ill-appearing. HENT:  
   Head: Normocephalic and atraumatic. Mouth/Throat: Mouth: Mucous membranes are dry. Eyes:  
   Conjunctiva/sclera: Conjunctivae normal.  
   Pupils: Pupils are equal, round, and reactive to light. Neck: Musculoskeletal: Normal range of motion and neck supple. Cardiovascular:  
   Rate and Rhythm: Normal rate and regular rhythm. Pulmonary:  
   Effort: Pulmonary effort is normal.  
   Breath sounds: Rales present. Abdominal:  
   Palpations: Abdomen is soft. Tenderness: There is no abdominal tenderness. There is no guarding or rebound. Musculoskeletal: Normal range of motion. General: No deformity. Lymphadenopathy:  
   Cervical: No cervical adenopathy. Skin: 
   General: Skin is warm and dry. Coloration: Skin is pale. Findings: No rash. Neurological:  
   General: No focal deficit present. Mental Status: She is alert. She is confused. GCS: GCS eye subscore is 4. GCS verbal subscore is 5. GCS motor subscore is 6. Cranial Nerves: No cranial nerve deficit or facial asymmetry. Sensory: Sensation is intact. No sensory deficit. Motor: Weakness present. No tremor or seizure activity. Comments: Diffusely weak, very slow to answer questions. Keeps closing eyes and falling back asleep. Worse than normal per . No obvious focal deficits. MDM Number of Diagnoses or Management Options Diagnosis management comments: I wore appropriate PPE throughout this patient's ED visit. Jj Car MD, 10:49 AM 
 
Records were reviewed patient had a similar admission to the hospital 4 weeks ago with volume overload and electrolyte abnormalities from missing dialysis. EKG reviewed by me shows normal sinus rhythm rate of 75 no change compared to 1 month ago. 11:36 AM 
X-ray shows chronic cardiomegaly but stable compared to last month White count normal hemoglobin stable Chemistries show potassium of 5.3, BUN 85 creatinine 12 
 
 I discussed the case with the nurse practitioner on-call for nephrology while he was here in the emergency department. He knows the patient well from her previous admission to the hospital.  She is not hypoxic on her chronic nasal cannula oxygen here she does not need admission to the hospital he has arranged for outpatient treatment at her [facility] dialysis center this afternoon we will get EMS to transport her there they would like a rapid Covid swab done but go ahead and initiate transport per them. Voice dictation software was used during the making of this note. This software is not perfect and grammatical and other typographical errors may be present. This note has been proofread, but may still contain errors. Rabia Medrano MD; 12/29/2020 @11:37 AM  
=================================================================== Amount and/or Complexity of Data Reviewed Clinical lab tests: ordered and reviewed Tests in the radiology section of CPT®: reviewed and ordered Tests in the medicine section of CPT®: reviewed and ordered Review and summarize past medical records: yes Independent visualization of images, tracings, or specimens: yes Risk of Complications, Morbidity, and/or Mortality Presenting problems: moderate Diagnostic procedures: moderate Management options: moderate Patient Progress Patient progress: stable Procedures

## 2020-12-29 NOTE — DISCHARGE INSTRUCTIONS
You are being transported directly to your dialysis center for a scheduled outpatient treatment. Continue with all of your chronic medications and follow-up with your doctor there.
